# Patient Record
Sex: FEMALE | Race: WHITE | URBAN - METROPOLITAN AREA
[De-identification: names, ages, dates, MRNs, and addresses within clinical notes are randomized per-mention and may not be internally consistent; named-entity substitution may affect disease eponyms.]

---

## 2017-08-18 ENCOUNTER — HOSPITAL ENCOUNTER (OUTPATIENT)
Dept: GENERAL RADIOLOGY | Age: 77
Discharge: HOME | End: 2017-08-18
Attending: OBSTETRICS & GYNECOLOGY | Admitting: OBSTETRICS & GYNECOLOGY

## 2017-08-18 DIAGNOSIS — Z13.820 ENCOUNTER FOR IMAGING TO ASSESS OSTEOPOROSIS: ICD-10-CM

## 2017-08-18 DIAGNOSIS — M81.0 AGE-RELATED OSTEOPOROSIS WITHOUT CURRENT PATHOLOGICAL FRACTURE: ICD-10-CM

## 2022-10-24 DIAGNOSIS — N17.9 AKI (ACUTE KIDNEY INJURY) (HCC): ICD-10-CM

## 2022-10-25 LAB
ALBUMIN SERPL-MCNC: 4.2 G/DL (ref 3.4–5)
ANION GAP SERPL CALCULATED.3IONS-SCNC: 12 MMOL/L (ref 3–16)
BACTERIA: ABNORMAL /HPF
BUN BLDV-MCNC: 17 MG/DL (ref 7–20)
CALCIUM SERPL-MCNC: 9.4 MG/DL (ref 8.3–10.6)
CHLORIDE BLD-SCNC: 104 MMOL/L (ref 99–110)
CO2: 25 MMOL/L (ref 21–32)
COMMENT UA: ABNORMAL
CREAT SERPL-MCNC: 2 MG/DL (ref 0.6–1.2)
EPITHELIAL CELLS, UA: ABNORMAL /HPF (ref 0–5)
GFR SERPL CREATININE-BSD FRML MDRD: 24 ML/MIN/{1.73_M2}
GLUCOSE BLD-MCNC: 146 MG/DL (ref 70–99)
KAPPA, FREE LIGHT CHAINS, SERUM: 39.37 MG/L (ref 3.3–19.4)
KAPPA/LAMBDA RATIO: 1.06 (ref 0.26–1.65)
KAPPA/LAMBDA TEST COMMENT: ABNORMAL
LAMBDA, FREE LIGHT CHAINS, SERUM: 37.18 MG/L (ref 5.71–26.3)
PHOSPHORUS: 4.1 MG/DL (ref 2.5–4.9)
POTASSIUM SERPL-SCNC: 5 MMOL/L (ref 3.5–5.1)
RBC UA: ABNORMAL /HPF (ref 0–4)
SODIUM BLD-SCNC: 141 MMOL/L (ref 136–145)
URINE TYPE: ABNORMAL
WBC UA: 7688 /HPF (ref 0–5)

## 2022-10-31 ENCOUNTER — HOSPITAL ENCOUNTER (OUTPATIENT)
Dept: ULTRASOUND IMAGING | Age: 82
Discharge: HOME | End: 2022-10-31
Payer: MEDICARE

## 2022-10-31 DIAGNOSIS — N17.9 AKI (ACUTE KIDNEY INJURY) (HCC): ICD-10-CM

## 2022-10-31 PROCEDURE — 76770 US EXAM ABDO BACK WALL COMP: CPT

## 2022-12-02 ENCOUNTER — OFFICE VISIT (OUTPATIENT)
Dept: SURGERY | Age: 82
End: 2022-12-02
Payer: MEDICARE

## 2022-12-02 VITALS
BODY MASS INDEX: 20.81 KG/M2 | WEIGHT: 106 LBS | SYSTOLIC BLOOD PRESSURE: 126 MMHG | DIASTOLIC BLOOD PRESSURE: 75 MMHG | HEIGHT: 60 IN | HEART RATE: 62 BPM

## 2022-12-02 DIAGNOSIS — L73.2 HIDRADENITIS SUPPURATIVA: Primary | ICD-10-CM

## 2022-12-02 PROCEDURE — 99203 OFFICE O/P NEW LOW 30 MIN: CPT | Performed by: SURGERY

## 2022-12-02 PROCEDURE — 1123F ACP DISCUSS/DSCN MKR DOCD: CPT | Performed by: SURGERY

## 2022-12-02 RX ORDER — DOXYCYCLINE HYCLATE 100 MG
100 TABLET ORAL 2 TIMES DAILY
Qty: 28 TABLET | Refills: 0 | Status: SHIPPED | OUTPATIENT
Start: 2022-12-02 | End: 2022-12-16

## 2022-12-02 NOTE — PROGRESS NOTES
PATIENT NAME: Hailey Vasquez     YOB: 1940     TODAY'S DATE: 12/9/2022    Reason for Visit:  Draining site in the left groin    Requesting Physician:  Dr. Alberto Parks:              The patient is a 78 y.o. female with a PMHx as delineated below who presents with a several month history of chronic drainage from a 1-2 cm area just over her left pubic tubercle. This has been associated with induration and erythema. Chief Complaint   Patient presents with    New Patient     Draining abscess on abdominal wall        REVIEW OF SYSTEMS:  CONSTITUTIONAL:  negative  HEENT:  negative  RESPIRATORY:  negative  CARDIOVASCULAR:  negative  GASTROINTESTINAL:  negative  GENITOURINARY:  negative  HEMATOLOGIC/LYMPHATIC:  negative  MUSCULOSKELETAL: negative  NEUROLOGICAL:  negative    PMH  Past Medical History:   Diagnosis Date    Osteoporosis        PSH  No past surgical history on file. Social History  Social History     Socioeconomic History    Marital status:      Spouse name: Not on file    Number of children: Not on file    Years of education: Not on file    Highest education level: Not on file   Occupational History    Not on file   Tobacco Use    Smoking status: Former    Smokeless tobacco: Never   Substance and Sexual Activity    Alcohol use: Yes    Drug use: Not on file    Sexual activity: Not on file   Other Topics Concern    Not on file   Social History Narrative    Not on file     Social Determinants of Health     Financial Resource Strain: Not on file   Food Insecurity: Not on file   Transportation Needs: Not on file   Physical Activity: Not on file   Stress: Not on file   Social Connections: Not on file   Intimate Partner Violence: Not on file   Housing Stability: Not on file       Family History:   No family history on file.     Allergy: No Known Allergies    PHYSICAL EXAM:  VITALS:  /75   Pulse 62   Ht 5' (1.524 m)   Wt 106 lb (48.1 kg)   BMI 20.70 kg/m² CONSTITUTIONAL:  alert, no apparent distress and normal weight  EYES:  sclera clear  ENT:  normocepalic, without obvious abnormality  NECK:  supple, symmetrical, trachea midline and no carotid bruits  LUNGS:  clear to auscultation  CARDIOVASCULAR:  regular rate and rhythm and no murmur noted  ABDOMEN:  no scars, normal bowel sounds, soft, non-distended, non-tender, voluntary guarding absent, no masses palpated and hernia absent  MUSCULOSKELETAL:  0+ pitting edema lower extremities  NEUROLOGIC:  Mental Status Exam:  Level of Alertness:   awake  Orientation:   person, place, time  SKIN:  Over the left medial groin, there is a 1.5 cm area of induration with a small skin opening. IMPRESSION/RECOMMENDATIONS:    The patient has a chronically infected area of her medial left groin. This mat represent a chronically infected hair follicle or hidradenitis. I will place her on a 2 week course of Doxycycline and if this does not resolve consider excision.      Judy Christian MD

## 2022-12-03 NOTE — PROGRESS NOTES
PATIENT NAME: Santa Holden     YOB: 1940     TODAY'S DATE: 12/2/2022    Reason for Visit:  Chronic groin wound      HISTORY OF PRESENT ILLNESS:              The patient is a 78 y.o. female with a PMHx as delineated below who presents with a chronically draining wound over the past 2 months. She reports noticing the wound on her left groin after striking it with a poor sleeper. Since then she has had continued serous drainage from the site but denies any associated pain, discomfort, or other symptoms including fevers, chills or edema. REVIEW OF SYSTEMS:  CONSTITUTIONAL:  negative  HEENT:  negative  RESPIRATORY:  negative  CARDIOVASCULAR:  negative  GASTROINTESTINAL:  negative  GENITOURINARY:  negative  HEMATOLOGIC/LYMPHATIC:  negative  MUSCULOSKELETAL: negative  NEUROLOGICAL:  negative    PMH  Past Medical History:   Diagnosis Date    Osteoporosis        PSH  No past surgical history on file. Social History  Social History     Socioeconomic History    Marital status:      Spouse name: Not on file    Number of children: Not on file    Years of education: Not on file    Highest education level: Not on file   Occupational History    Not on file   Tobacco Use    Smoking status: Former    Smokeless tobacco: Never   Substance and Sexual Activity    Alcohol use: Yes    Drug use: Not on file    Sexual activity: Not on file   Other Topics Concern    Not on file   Social History Narrative    Not on file     Social Determinants of Health     Financial Resource Strain: Not on file   Food Insecurity: Not on file   Transportation Needs: Not on file   Physical Activity: Not on file   Stress: Not on file   Social Connections: Not on file   Intimate Partner Violence: Not on file   Housing Stability: Not on file       Family History:   No family history on file.     Allergy: No Known Allergies    PHYSICAL EXAM:  VITALS:  /75   Pulse 62   Ht 5' (1.524 m)   Wt 106 lb (48.1 kg)   BMI 20.70 kg/m² CONSTITUTIONAL:  alert, no apparent distress and thin  EYES:  sclera clear  ENT:  normocepalic, without obvious abnormality  NECK:  supple, symmetrical, trachea midline  LUNGS:  clear to auscultation  CARDIOVASCULAR:  regular rate and rhythm  ABDOMEN:  no scars, soft, non-distended, non-tender, no masses palpated and hernia absent  NEUROLOGIC:  Mental Status Exam:  Level of Alertness:   awake  Orientation:   person, place, time  SKIN: Mildly erythematous area overlying the left pubic bone with punctate area draining thin serous material upon expression; no evidence of purulence        IMPRESSION/RECOMMENDATIONS:    Given the patient's chronically draining wound, will prescribe a short course of doxycycline and have the patient return to clinic later this month. Aurelio Dos Santos MD  General Surgery, PGY-3  12/02/22  9:12 PM  [phone]

## 2022-12-09 ENCOUNTER — OFFICE VISIT (OUTPATIENT)
Dept: SURGERY | Age: 82
End: 2022-12-09
Payer: MEDICARE

## 2022-12-09 VITALS
WEIGHT: 105 LBS | DIASTOLIC BLOOD PRESSURE: 67 MMHG | BODY MASS INDEX: 20.51 KG/M2 | SYSTOLIC BLOOD PRESSURE: 143 MMHG | HEART RATE: 100 BPM

## 2022-12-09 DIAGNOSIS — L73.2 HIDRADENITIS SUPPURATIVA: Primary | ICD-10-CM

## 2022-12-09 PROCEDURE — 99213 OFFICE O/P EST LOW 20 MIN: CPT | Performed by: SURGERY

## 2022-12-09 PROCEDURE — 1123F ACP DISCUSS/DSCN MKR DOCD: CPT | Performed by: SURGERY

## 2022-12-12 NOTE — PROGRESS NOTES
PATIENT NAME: Grabiel Hendrickson     YOB: 1940     TODAY'S DATE: 12/11/2022    Reason for Visit:  Draining site in the left groin    Requesting Physician:  Dr. Zabrina Castellano:              The patient is a 78 y.o. female with a PMHx as delineated below who presents with a several month history of chronic drainage from a 1-2 cm area just over her left pubic tubercle. This has been associated with induration and erythema. Chief Complaint   Patient presents with    Follow-up     medial left groin wound        REVIEW OF SYSTEMS:  CONSTITUTIONAL:  negative  HEENT:  negative  RESPIRATORY:  negative  CARDIOVASCULAR:  negative  GASTROINTESTINAL:  negative  GENITOURINARY:  negative  HEMATOLOGIC/LYMPHATIC:  negative  MUSCULOSKELETAL: negative  NEUROLOGICAL:  negative    PMH  Past Medical History:   Diagnosis Date    Osteoporosis        PSH  No past surgical history on file. Social History  Social History     Socioeconomic History    Marital status:      Spouse name: Not on file    Number of children: Not on file    Years of education: Not on file    Highest education level: Not on file   Occupational History    Not on file   Tobacco Use    Smoking status: Former    Smokeless tobacco: Never   Substance and Sexual Activity    Alcohol use: Yes    Drug use: Not on file    Sexual activity: Not on file   Other Topics Concern    Not on file   Social History Narrative    Not on file     Social Determinants of Health     Financial Resource Strain: Not on file   Food Insecurity: Not on file   Transportation Needs: Not on file   Physical Activity: Not on file   Stress: Not on file   Social Connections: Not on file   Intimate Partner Violence: Not on file   Housing Stability: Not on file       Family History:   No family history on file.     Allergy: No Known Allergies    PHYSICAL EXAM:  VITALS:  BP (!) 143/67   Pulse 100   Wt 105 lb (47.6 kg)   BMI 20.51 kg/m²     CONSTITUTIONAL:  alert, no apparent distress and normal weight  EYES:  sclera clear  ENT:  normocepalic, without obvious abnormality  NECK:  supple, symmetrical, trachea midline and no carotid bruits  LUNGS:  clear to auscultation  CARDIOVASCULAR:  regular rate and rhythm and no murmur noted  ABDOMEN:  no scars, normal bowel sounds, soft, non-distended, non-tender, voluntary guarding absent, no masses palpated and hernia absent  MUSCULOSKELETAL:  0+ pitting edema lower extremities  NEUROLOGIC:  Mental Status Exam:  Level of Alertness:   awake  Orientation:   person, place, time  SKIN:  Over the left medial groin, there is a 1.5 cm area of induration with a small skin opening. IMPRESSION/RECOMMENDATIONS:    The patient has a chronically infected area of her medial left groin, possibly a chronically infected hair follicle or hidradenitis. She unfortunately only started her Doxycycline course a few days ago, so we will see her back in another two weeks for re-evaluation. Sandor Parra MD  General Surgery, PGY-3  12/11/22  9:11 PM  [phone]    I have seen, examined, and reviewed the patients chart. I agree with the residents assessment and have made appropriate changes.     Paolo Hunter

## 2022-12-21 ENCOUNTER — TELEPHONE (OUTPATIENT)
Dept: SURGERY | Age: 82
End: 2022-12-21

## 2022-12-22 ENCOUNTER — OFFICE VISIT (OUTPATIENT)
Dept: SURGERY | Age: 82
End: 2022-12-22
Payer: MEDICARE

## 2022-12-22 VITALS
BODY MASS INDEX: 20.51 KG/M2 | DIASTOLIC BLOOD PRESSURE: 66 MMHG | SYSTOLIC BLOOD PRESSURE: 144 MMHG | WEIGHT: 105 LBS | HEART RATE: 99 BPM

## 2022-12-22 DIAGNOSIS — L73.2 HIDRADENITIS SUPPURATIVA: Primary | ICD-10-CM

## 2022-12-22 PROCEDURE — 1123F ACP DISCUSS/DSCN MKR DOCD: CPT | Performed by: SURGERY

## 2022-12-22 PROCEDURE — 99213 OFFICE O/P EST LOW 20 MIN: CPT | Performed by: SURGERY

## 2022-12-22 RX ORDER — DOXYCYCLINE HYCLATE 100 MG
100 TABLET ORAL 2 TIMES DAILY
Qty: 20 TABLET | Refills: 0 | Status: SHIPPED | OUTPATIENT
Start: 2022-12-22 | End: 2023-01-01

## 2022-12-29 NOTE — PROGRESS NOTES
PATIENT NAME: Agueda Gale     YOB: 1940     TODAY'S DATE: 12/29/2022    Reason for Visit:  Draining site in the left groin    Requesting Physician:  Dr. Winter Halo:              The patient is a 78 y.o. female with a PMHx as delineated below who presents with a several month history of chronic drainage from a 1-2 cm area just over her left pubic tubercle. This has been associated with induration and erythema. Chief Complaint   Patient presents with    Follow-up     2 wk f/u        REVIEW OF SYSTEMS:  CONSTITUTIONAL:  negative  HEENT:  negative  RESPIRATORY:  negative  CARDIOVASCULAR:  negative  GASTROINTESTINAL:  negative  GENITOURINARY:  negative  HEMATOLOGIC/LYMPHATIC:  negative  MUSCULOSKELETAL: negative  NEUROLOGICAL:  negative    PMH  Past Medical History:   Diagnosis Date    Osteoporosis        PSH  No past surgical history on file. Social History  Social History     Socioeconomic History    Marital status:      Spouse name: Not on file    Number of children: Not on file    Years of education: Not on file    Highest education level: Not on file   Occupational History    Not on file   Tobacco Use    Smoking status: Former    Smokeless tobacco: Never   Substance and Sexual Activity    Alcohol use: Yes    Drug use: Not on file    Sexual activity: Not on file   Other Topics Concern    Not on file   Social History Narrative    Not on file     Social Determinants of Health     Financial Resource Strain: Not on file   Food Insecurity: Not on file   Transportation Needs: Not on file   Physical Activity: Not on file   Stress: Not on file   Social Connections: Not on file   Intimate Partner Violence: Not on file   Housing Stability: Not on file       Family History:   No family history on file.     Allergy: No Known Allergies    PHYSICAL EXAM:  VITALS:  BP (!) 144/66   Pulse 99   Wt 105 lb (47.6 kg)   BMI 20.51 kg/m²     CONSTITUTIONAL:  alert, no apparent distress and normal weight  EYES:  sclera clear  ENT:  normocepalic, without obvious abnormality  NECK:  supple, symmetrical, trachea midline and no carotid bruits  LUNGS:  clear to auscultation  CARDIOVASCULAR:  regular rate and rhythm and no murmur noted  ABDOMEN:  no scars, normal bowel sounds, soft, non-distended, non-tender, voluntary guarding absent, no masses palpated and hernia absent  MUSCULOSKELETAL:  0+ pitting edema lower extremities  NEUROLOGIC:  Mental Status Exam:  Level of Alertness:   awake  Orientation:   person, place, time  SKIN:  Over the left medial groin, there is a 1.5 cm area of induration with a small skin opening. This was anaesthetized with 1% lidocaine and the area was unroofed. Purulent fluid expressed. Hemostasis was obtained with silver nitrate sticks. IMPRESSION/RECOMMENDATIONS:    The patient has a chronically infected area of her medial left groin that was unroofed in the office. This will heal by secondary intention. Wound care instructions were given and I will see her back in the office in 2 weeks.      Nkechi Rodriguez

## 2023-01-13 ENCOUNTER — OFFICE VISIT (OUTPATIENT)
Dept: SURGERY | Age: 83
End: 2023-01-13
Payer: MEDICARE

## 2023-01-13 VITALS
WEIGHT: 105 LBS | HEART RATE: 98 BPM | BODY MASS INDEX: 20.51 KG/M2 | SYSTOLIC BLOOD PRESSURE: 140 MMHG | DIASTOLIC BLOOD PRESSURE: 68 MMHG

## 2023-01-13 DIAGNOSIS — L73.2 HIDRADENITIS SUPPURATIVA: Primary | ICD-10-CM

## 2023-01-13 DIAGNOSIS — N18.30 STAGE 3 CHRONIC KIDNEY DISEASE, UNSPECIFIED WHETHER STAGE 3A OR 3B CKD (HCC): ICD-10-CM

## 2023-01-13 DIAGNOSIS — N17.9 ACUTE KIDNEY INJURY SUPERIMPOSED ON CHRONIC KIDNEY DISEASE (HCC): ICD-10-CM

## 2023-01-13 DIAGNOSIS — N18.9 ACUTE KIDNEY INJURY SUPERIMPOSED ON CHRONIC KIDNEY DISEASE (HCC): ICD-10-CM

## 2023-01-13 LAB
ALBUMIN SERPL-MCNC: 4 G/DL (ref 3.4–5)
ANION GAP SERPL CALCULATED.3IONS-SCNC: 11 MMOL/L (ref 3–16)
BASOPHILS ABSOLUTE: 0 K/UL (ref 0–0.2)
BASOPHILS RELATIVE PERCENT: 0.4 %
BUN BLDV-MCNC: 15 MG/DL (ref 7–20)
CALCIUM SERPL-MCNC: 9.5 MG/DL (ref 8.3–10.6)
CHLORIDE BLD-SCNC: 102 MMOL/L (ref 99–110)
CO2: 25 MMOL/L (ref 21–32)
CREAT SERPL-MCNC: 1.3 MG/DL (ref 0.6–1.2)
CREATININE URINE: 257.9 MG/DL (ref 28–259)
EOSINOPHILS ABSOLUTE: 0.1 K/UL (ref 0–0.6)
EOSINOPHILS RELATIVE PERCENT: 1.9 %
GFR SERPL CREATININE-BSD FRML MDRD: 40 ML/MIN/{1.73_M2}
GLUCOSE BLD-MCNC: 129 MG/DL (ref 70–99)
HCT VFR BLD CALC: 37.4 % (ref 36–48)
HEMOGLOBIN: 12 G/DL (ref 12–16)
LYMPHOCYTES ABSOLUTE: 2.6 K/UL (ref 1–5.1)
LYMPHOCYTES RELATIVE PERCENT: 37.6 %
MCH RBC QN AUTO: 29.4 PG (ref 26–34)
MCHC RBC AUTO-ENTMCNC: 32.1 G/DL (ref 31–36)
MCV RBC AUTO: 91.6 FL (ref 80–100)
MICROALBUMIN UR-MCNC: 81.5 MG/DL
MICROALBUMIN/CREAT UR-RTO: 316 MG/G (ref 0–30)
MONOCYTES ABSOLUTE: 0.4 K/UL (ref 0–1.3)
MONOCYTES RELATIVE PERCENT: 6 %
NEUTROPHILS ABSOLUTE: 3.8 K/UL (ref 1.7–7.7)
NEUTROPHILS RELATIVE PERCENT: 54.1 %
PDW BLD-RTO: 14.5 % (ref 12.4–15.4)
PHOSPHORUS: 3.7 MG/DL (ref 2.5–4.9)
PLATELET # BLD: 325 K/UL (ref 135–450)
PMV BLD AUTO: 7.4 FL (ref 5–10.5)
POTASSIUM SERPL-SCNC: 4.3 MMOL/L (ref 3.5–5.1)
RBC # BLD: 4.08 M/UL (ref 4–5.2)
SODIUM BLD-SCNC: 138 MMOL/L (ref 136–145)
WBC # BLD: 6.9 K/UL (ref 4–11)

## 2023-01-13 PROCEDURE — 1123F ACP DISCUSS/DSCN MKR DOCD: CPT | Performed by: SURGERY

## 2023-01-13 PROCEDURE — 99212 OFFICE O/P EST SF 10 MIN: CPT | Performed by: SURGERY

## 2023-01-13 NOTE — PROGRESS NOTES
PATIENT NAME: Carri George     YOB: 1940     TODAY'S DATE: 1/25/2023    Reason for Visit:  Draining site in the left groin    Requesting Physician:  Dr. Sims Stable:              The patient is a 78 y.o. female with a PMHx as delineated below who presents for follow up without complaints. Chief Complaint   Patient presents with    Follow-up     4 wk f/u        REVIEW OF SYSTEMS:  CONSTITUTIONAL:  negative  HEENT:  negative  RESPIRATORY:  negative  CARDIOVASCULAR:  negative  GASTROINTESTINAL:  negative  GENITOURINARY:  negative  HEMATOLOGIC/LYMPHATIC:  negative  MUSCULOSKELETAL: negative  NEUROLOGICAL:  negative    PMH  Past Medical History:   Diagnosis Date    Osteoporosis        PSH  No past surgical history on file. Social History  Social History     Socioeconomic History    Marital status:      Spouse name: Not on file    Number of children: Not on file    Years of education: Not on file    Highest education level: Not on file   Occupational History    Not on file   Tobacco Use    Smoking status: Former    Smokeless tobacco: Never   Substance and Sexual Activity    Alcohol use: Yes    Drug use: Not on file    Sexual activity: Not on file   Other Topics Concern    Not on file   Social History Narrative    Not on file     Social Determinants of Health     Financial Resource Strain: Not on file   Food Insecurity: Not on file   Transportation Needs: Not on file   Physical Activity: Not on file   Stress: Not on file   Social Connections: Not on file   Intimate Partner Violence: Not on file   Housing Stability: Not on file       Family History:   No family history on file.     Allergy: No Known Allergies    PHYSICAL EXAM:  VITALS:  BP (!) 140/68   Pulse 98   Wt 105 lb (47.6 kg)   BMI 20.51 kg/m²     CONSTITUTIONAL:  alert, no apparent distress and normal weight  EYES:  sclera clear  ENT:  normocepalic, without obvious abnormality  NECK:  supple, symmetrical, trachea midline and no carotid bruits  LUNGS:  clear to auscultation  CARDIOVASCULAR:  regular rate and rhythm and no murmur noted  ABDOMEN:  no scars, normal bowel sounds, soft, non-distended, non-tender, voluntary guarding absent, no masses palpated and hernia absent  MUSCULOSKELETAL:  0+ pitting edema lower extremities  NEUROLOGIC:  Mental Status Exam:  Level of Alertness:   awake  Orientation:   person, place, time  SKIN:  Over the left medial groin, the incision is healing, no erythema    IMPRESSION/RECOMMENDATIONS:    The patient has a chronically infected area of her medial left groin that was unroofed in the office. Wound healing well but still with drainage. No infection. Wound care instructions were given and I will see her back in the office in 2 weeks.      Alex Campos

## 2023-02-03 ENCOUNTER — OFFICE VISIT (OUTPATIENT)
Dept: SURGERY | Age: 83
End: 2023-02-03
Payer: MEDICARE

## 2023-02-03 VITALS
HEART RATE: 98 BPM | BODY MASS INDEX: 20.51 KG/M2 | DIASTOLIC BLOOD PRESSURE: 38 MMHG | SYSTOLIC BLOOD PRESSURE: 140 MMHG | WEIGHT: 105 LBS

## 2023-02-03 DIAGNOSIS — S31.109D CHRONIC WOUND INFECTION OF ABDOMEN, SUBSEQUENT ENCOUNTER: Primary | ICD-10-CM

## 2023-02-03 DIAGNOSIS — L08.9 CHRONIC WOUND INFECTION OF ABDOMEN, SUBSEQUENT ENCOUNTER: Primary | ICD-10-CM

## 2023-02-03 PROCEDURE — 99213 OFFICE O/P EST LOW 20 MIN: CPT | Performed by: SURGERY

## 2023-02-03 PROCEDURE — 1123F ACP DISCUSS/DSCN MKR DOCD: CPT | Performed by: SURGERY

## 2023-02-03 NOTE — LETTER
P - [facility], M[facility]  [address] [address] [facility]. [address]  Phone: [phone] Fax: [phone]            Surgery Order/Time:                 Conf. # _________________  Scheduled by: Mago Jacinto Lpn                                **Pre-Surgical Orders in [facility]**  Facility: [facility] [place].    Surgery Date: 2023  Time: 1230pm    Pt arrival: 1030  Second Surgeon: N/A        Patient Name: Maggy Rivero  : 1940  Home Ph:[phone] (home)  [address] [place] [address] Road,[address] [facility]  PCP:  Cleveland Zuluaga MD   Does patient speak English?: yes    needed? no                                             PROCEDURE: Excision of chronic wound of the left groin  CPT: 27252: Excision, tumor, soft tissue of pelvis and hip area, subcutaneous   DIAGNOSIS:      ICD-10-CM    1. Chronic wound infection of abdomen, subsequent encounter  S31.109D     L08.9           Anesthesia: MAC  Time Needed:  30 minutes   Pt Position:  supine      Outpatient __x__ Admit ______  Assist._____   Cardiac Clearance: no  Patient to meet with Anesthesiology prior to surgery: no    Patient Allergies: No Known Allergies  Pre-Op H&P to be done by: Cleveland Zuluaga MD  Pre-Op Antibiotics: Ancef: 2g IV On Call to OR      Physician: Robinson Monroe MD Date: 23             Insurance:     ID #      Ph #   Date called ________________   Donal Mccallum to: _____________ Precert Needed?  Yes  /  No  PreAuth # & Details   ______________________________________________________________________

## 2023-02-08 ENCOUNTER — TELEPHONE (OUTPATIENT)
Dept: SURGERY | Age: 83
End: 2023-02-08

## 2023-02-08 NOTE — TELEPHONE ENCOUNTER
Patient seen on 2/3/23 surgery letter received Excision of chronic wound of the left groin 30 min OR time needed under MAC    Covid vaccinated    Pre op instructions reviewed including the need for a H&P with a EKG and a 18 or older  for DOS   Pre op packet mailed     Post op appt scheduled     Faxed to scheduling     Placed on calender and outlook

## 2023-02-14 ENCOUNTER — HOSPITAL ENCOUNTER (OUTPATIENT)
Dept: NUCLEAR MEDICINE | Age: 83
Discharge: HOME | End: 2023-02-14
Payer: MEDICARE

## 2023-02-14 DIAGNOSIS — N18.30 STAGE 3 CHRONIC KIDNEY DISEASE, UNSPECIFIED WHETHER STAGE 3A OR 3B CKD (HCC): ICD-10-CM

## 2023-02-14 DIAGNOSIS — N17.9 AKI (ACUTE KIDNEY INJURY) (HCC): ICD-10-CM

## 2023-02-14 PROCEDURE — 6360000002 HC RX W HCPCS: Performed by: INTERNAL MEDICINE

## 2023-02-14 PROCEDURE — 78708 K FLOW/FUNCT IMAGE W/DRUG: CPT

## 2023-02-14 PROCEDURE — A9562 TC99M MERTIATIDE: HCPCS | Performed by: INTERNAL MEDICINE

## 2023-02-14 PROCEDURE — 3430000000 HC RX DIAGNOSTIC RADIOPHARMACEUTICAL: Performed by: INTERNAL MEDICINE

## 2023-02-14 RX ORDER — FUROSEMIDE 10 MG/ML
40 INJECTION INTRAMUSCULAR; INTRAVENOUS ONCE
Status: COMPLETED | OUTPATIENT
Start: 2023-02-14 | End: 2023-02-14

## 2023-02-14 RX ADMIN — FUROSEMIDE 40 MG: 10 INJECTION, SOLUTION INTRAMUSCULAR; INTRAVENOUS at 13:34

## 2023-02-14 RX ADMIN — Medication 8 MILLICURIE: at 13:12

## 2023-02-16 NOTE — PROGRESS NOTES
PATIENT NAME: Jacky Lundborg     YOB: 1940     TODAY'S DATE: 2/16/2023    Reason for Visit:  Draining site in the left groin    Requesting Physician:  Dr. Reese Merritt:              The patient is a 78 y.o. female with a PMHx as delineated below who presents for follow up without complaints. Chief Complaint   Patient presents with    Follow-up     F/u draining abscess on abd wall        REVIEW OF SYSTEMS:  CONSTITUTIONAL:  negative  HEENT:  negative  RESPIRATORY:  negative  CARDIOVASCULAR:  negative  GASTROINTESTINAL:  negative  GENITOURINARY:  negative  HEMATOLOGIC/LYMPHATIC:  negative  MUSCULOSKELETAL: negative  NEUROLOGICAL:  negative    PMH  Past Medical History:   Diagnosis Date    Chronic renal failure, stage 3 (moderate), unspecified whether stage 3a or 3b CKD (HCC)     Osteoporosis        PSH  No past surgical history on file. Social History  Social History     Socioeconomic History    Marital status:      Spouse name: Not on file    Number of children: Not on file    Years of education: Not on file    Highest education level: Not on file   Occupational History    Not on file   Tobacco Use    Smoking status: Former    Smokeless tobacco: Never   Substance and Sexual Activity    Alcohol use: Yes    Drug use: Not on file    Sexual activity: Not on file   Other Topics Concern    Not on file   Social History Narrative    Not on file     Social Determinants of Health     Financial Resource Strain: Not on file   Food Insecurity: Not on file   Transportation Needs: Not on file   Physical Activity: Not on file   Stress: Not on file   Social Connections: Not on file   Intimate Partner Violence: Not on file   Housing Stability: Not on file       Family History:   No family history on file.     Allergy: No Known Allergies    PHYSICAL EXAM:  VITALS:  BP (!) 140/38   Pulse 98   Wt 105 lb (47.6 kg)   BMI 20.51 kg/m²     CONSTITUTIONAL:  alert, no apparent distress and normal weight  EYES:  sclera clear  ENT:  normocepalic, without obvious abnormality  NECK:  supple, symmetrical, trachea midline and no carotid bruits  LUNGS:  clear to auscultation  CARDIOVASCULAR:  regular rate and rhythm and no murmur noted  ABDOMEN:  no scars, normal bowel sounds, soft, non-distended, non-tender, voluntary guarding absent, no masses palpated and hernia absent  MUSCULOSKELETAL:  0+ pitting edema lower extremities  NEUROLOGIC:  Mental Status Exam:  Level of Alertness:   awake  Orientation:   person, place, time  SKIN:  Over the left medial groin, the incision is healing but still open and draining, no erythema    IMPRESSION/RECOMMENDATIONS:    The patient has a chronically infected area of her medial left groin that was unroofed in the office. The wound continues not to heal and as a result, we will proceed with excision under local mac. The risks and benefits of surgery were discussed with the patient and she wishes to proceed.      Gerhardt Sawyer

## 2023-02-24 NOTE — PROGRESS NOTES
Place patient label inside box (if no patient label, complete below)  Name:  :  MR#:   Afia Tavarez / PROCEDURE  I (we) Callie Whitlock (Patient Name) authorize DR Last Castaneda (Provider / Damaris Handler) and/or such assistants as may be selected by him/her, to perform the following operation/procedure(s): EXCISION OF CHRONIC WOUND OF THE LEFT GROIN       Note: If unable to obtain consent prior to an emergent procedure, document the emergent reason in the medical record. This procedure has been explained to my (our) satisfaction and included in the explanation was: The intended benefit, nature, and extent of the procedure to be performed; The significant risks involved and the probability of success; Alternative procedures and methods of treatment; The dangers and probable consequences of such alternatives (including no procedure or treatment); The expected consequences of the procedure on my future health; Whether other qualified individuals would be performing important surgical tasks and/or whether  would be present to advise or support the procedure. I (we) understand that there are other risks of infection and other serious complications in the pre-operative/procedural and postoperative/procedural stages of my (our) care. I (we) have asked all of the questions which I (we) thought were important in deciding whether or not to undergo treatment or diagnosis. These questions have been answered to my (our) satisfaction. I (we) understand that no assurance can be given that the procedure will be a success, and no guarantee or warranty of success has been given to me (us). It has been explained to me (us) that during the course of the operation/procedure, unforeseen conditions may be revealed that necessitate extension of the original procedure(s) or different procedure(s) than those set forth in Paragraph 1.  I (we) authorize and request that the above-named physician, his/her assistants or his/her designees, perform procedures as necessary and desirable if deemed to be in my (our) best interest.     Revised 8/2/2021                                                                          Page 1 of 2         I acknowledge that health care personnel may be observing this procedure for the purpose of medical education or other specified purposes as may be necessary as requested and/or approved by my (our) physician. I (we) consent to the disposal by the hospital Pathologist of the removed tissue, parts or organs in accordance with hospital policy. I do ____ do not ____ consent to the use of a local infiltration pain blocking agent that will be used by my provider/surgical provider to help alleviate pain during my procedure. I do ____ do not ____ consent to an emergent blood transfusion in the case of a life-threatening situation that requires blood components to be administered. This consent is valid for 24 hours from the beginning of the procedure. This patient does ____ or does not ____ currently have a DNR status/order. If DNR order is in place, obtain Addendum to the Surgical Consent for ALL Patients with a DNR Order to address nikki-operative status for limited intervention or DNR suspension.      I have read and fully understand the above Consent for Operation/Procedure and that all blanks were completed before I signed the consent.   _____________________________       _____________________      ____/____am/pm  Signature of Patient or legal representative      Printed Name / Relationship            Date / Time   ____________________________       _____________________      ____/____am/pm  Witness to Signature                                    Printed Name                    Date / Time    If patient is unable to sign or is a minor, complete the following)  Patient is a minor, ____ years of age, or unable to sign because: ______________________________________________________________________________________________    If a phone consent is obtained, consent will be documented by using two health care professionals, each affirming that the consenting party has no questions and gives consent for the procedure discussed with the physician/provider.   _____________________          ____________________       _____/_____am/pm   2nd witness to phone consent        Printed name           Date / Time    Informed Consent:  I have provided the explanation described above in section 1 to the patient and/or legal representative.  I have provided the patient and/or legal representative with an opportunity to ask any questions about the proposed operation/procedure.   ___________________________          ____________________         ____/____am/pm  Provider / Proceduralist                            Printed name            Date / Time  Revised 8/2/2021                                                                      Page 2 of 2

## 2023-02-24 NOTE — PROGRESS NOTES
2/24 Spoke with patient, states son is out of town, returning today, and he handles her appt. Has not see PCP, Babar Salazar at Dr Kingsley Rivera about this, awaiting response.   [facility] for son Radha Bowman about needing h&p and phone number left-mp

## 2023-02-28 ENCOUNTER — ANESTHESIA EVENT (OUTPATIENT)
Dept: OPERATING ROOM | Age: 83
End: 2023-02-28
Payer: MEDICARE

## 2023-02-28 ENCOUNTER — TELEPHONE (OUTPATIENT)
Dept: SURGERY | Age: 83
End: 2023-02-28

## 2023-02-28 ENCOUNTER — HOSPITAL ENCOUNTER (OUTPATIENT)
Age: 83
Setting detail: OUTPATIENT SURGERY
Discharge: HOME | End: 2023-02-28
Attending: SURGERY | Admitting: SURGERY
Payer: MEDICARE

## 2023-02-28 ENCOUNTER — ANESTHESIA (OUTPATIENT)
Dept: OPERATING ROOM | Age: 83
End: 2023-02-28
Payer: MEDICARE

## 2023-02-28 VITALS
WEIGHT: 102.8 LBS | DIASTOLIC BLOOD PRESSURE: 89 MMHG | OXYGEN SATURATION: 100 % | HEART RATE: 63 BPM | SYSTOLIC BLOOD PRESSURE: 162 MMHG | BODY MASS INDEX: 20.18 KG/M2 | RESPIRATION RATE: 16 BRPM | TEMPERATURE: 96.7 F | HEIGHT: 60 IN

## 2023-02-28 DIAGNOSIS — S31.109D CHRONIC WOUND INFECTION OF ABDOMEN, SUBSEQUENT ENCOUNTER: ICD-10-CM

## 2023-02-28 DIAGNOSIS — G89.18 POST-OP PAIN: Primary | ICD-10-CM

## 2023-02-28 DIAGNOSIS — L08.9 CHRONIC WOUND INFECTION OF ABDOMEN, SUBSEQUENT ENCOUNTER: ICD-10-CM

## 2023-02-28 LAB
GLUCOSE BLD-MCNC: 88 MG/DL (ref 70–99)
PERFORMED ON: NORMAL

## 2023-02-28 PROCEDURE — 3700000001 HC ADD 15 MINUTES (ANESTHESIA): Performed by: SURGERY

## 2023-02-28 PROCEDURE — 2709999900 HC NON-CHARGEABLE SUPPLY: Performed by: SURGERY

## 2023-02-28 PROCEDURE — 11406 EXC TR-EXT B9+MARG >4.0 CM: CPT | Performed by: SURGERY

## 2023-02-28 PROCEDURE — 2500000003 HC RX 250 WO HCPCS: Performed by: SURGERY

## 2023-02-28 PROCEDURE — 6360000002 HC RX W HCPCS: Performed by: SURGERY

## 2023-02-28 PROCEDURE — 3600000014 HC SURGERY LEVEL 4 ADDTL 15MIN: Performed by: SURGERY

## 2023-02-28 PROCEDURE — 3600000004 HC SURGERY LEVEL 4 BASE: Performed by: SURGERY

## 2023-02-28 PROCEDURE — 88304 TISSUE EXAM BY PATHOLOGIST: CPT

## 2023-02-28 PROCEDURE — 3700000000 HC ANESTHESIA ATTENDED CARE: Performed by: SURGERY

## 2023-02-28 PROCEDURE — 7100000001 HC PACU RECOVERY - ADDTL 15 MIN: Performed by: SURGERY

## 2023-02-28 PROCEDURE — 6360000002 HC RX W HCPCS: Performed by: NURSE ANESTHETIST, CERTIFIED REGISTERED

## 2023-02-28 PROCEDURE — A4217 STERILE WATER/SALINE, 500 ML: HCPCS | Performed by: SURGERY

## 2023-02-28 PROCEDURE — 7100000010 HC PHASE II RECOVERY - FIRST 15 MIN: Performed by: SURGERY

## 2023-02-28 PROCEDURE — 2580000003 HC RX 258: Performed by: FAMILY MEDICINE

## 2023-02-28 PROCEDURE — 7100000011 HC PHASE II RECOVERY - ADDTL 15 MIN: Performed by: SURGERY

## 2023-02-28 PROCEDURE — 2580000003 HC RX 258: Performed by: SURGERY

## 2023-02-28 PROCEDURE — 7100000000 HC PACU RECOVERY - FIRST 15 MIN: Performed by: SURGERY

## 2023-02-28 RX ORDER — MEPERIDINE HYDROCHLORIDE 25 MG/ML
12.5 INJECTION INTRAMUSCULAR; INTRAVENOUS; SUBCUTANEOUS EVERY 5 MIN PRN
Status: DISCONTINUED | OUTPATIENT
Start: 2023-02-28 | End: 2023-02-28 | Stop reason: HOSPADM

## 2023-02-28 RX ORDER — PROPOFOL 10 MG/ML
INJECTION, EMULSION INTRAVENOUS CONTINUOUS PRN
Status: DISCONTINUED | OUTPATIENT
Start: 2023-02-28 | End: 2023-02-28 | Stop reason: SDUPTHER

## 2023-02-28 RX ORDER — CETIRIZINE HYDROCHLORIDE 10 MG/1
10 TABLET ORAL DAILY
COMMUNITY

## 2023-02-28 RX ORDER — LIDOCAINE HYDROCHLORIDE 20 MG/ML
INJECTION, SOLUTION INTRAVENOUS PRN
Status: DISCONTINUED | OUTPATIENT
Start: 2023-02-28 | End: 2023-02-28 | Stop reason: SDUPTHER

## 2023-02-28 RX ORDER — FENTANYL CITRATE 50 UG/ML
INJECTION, SOLUTION INTRAMUSCULAR; INTRAVENOUS PRN
Status: DISCONTINUED | OUTPATIENT
Start: 2023-02-28 | End: 2023-02-28 | Stop reason: SDUPTHER

## 2023-02-28 RX ORDER — SODIUM CHLORIDE, SODIUM LACTATE, POTASSIUM CHLORIDE, CALCIUM CHLORIDE 600; 310; 30; 20 MG/100ML; MG/100ML; MG/100ML; MG/100ML
INJECTION, SOLUTION INTRAVENOUS CONTINUOUS
Status: DISCONTINUED | OUTPATIENT
Start: 2023-02-28 | End: 2023-02-28 | Stop reason: HOSPADM

## 2023-02-28 RX ORDER — SODIUM CHLORIDE 9 MG/ML
25 INJECTION, SOLUTION INTRAVENOUS PRN
Status: DISCONTINUED | OUTPATIENT
Start: 2023-02-28 | End: 2023-02-28 | Stop reason: HOSPADM

## 2023-02-28 RX ORDER — LABETALOL HYDROCHLORIDE 5 MG/ML
10 INJECTION, SOLUTION INTRAVENOUS
Status: DISCONTINUED | OUTPATIENT
Start: 2023-02-28 | End: 2023-02-28 | Stop reason: HOSPADM

## 2023-02-28 RX ORDER — BUPIVACAINE HYDROCHLORIDE 2.5 MG/ML
INJECTION, SOLUTION EPIDURAL; INFILTRATION; INTRACAUDAL PRN
Status: DISCONTINUED | OUTPATIENT
Start: 2023-02-28 | End: 2023-02-28 | Stop reason: HOSPADM

## 2023-02-28 RX ORDER — MAGNESIUM HYDROXIDE 1200 MG/15ML
LIQUID ORAL CONTINUOUS PRN
Status: DISCONTINUED | OUTPATIENT
Start: 2023-02-28 | End: 2023-02-28 | Stop reason: HOSPADM

## 2023-02-28 RX ORDER — DIPHENHYDRAMINE HYDROCHLORIDE 50 MG/ML
12.5 INJECTION INTRAMUSCULAR; INTRAVENOUS
Status: DISCONTINUED | OUTPATIENT
Start: 2023-02-28 | End: 2023-02-28 | Stop reason: HOSPADM

## 2023-02-28 RX ORDER — HYDRALAZINE HYDROCHLORIDE 20 MG/ML
10 INJECTION INTRAMUSCULAR; INTRAVENOUS
Status: DISCONTINUED | OUTPATIENT
Start: 2023-02-28 | End: 2023-02-28 | Stop reason: HOSPADM

## 2023-02-28 RX ORDER — METOCLOPRAMIDE HYDROCHLORIDE 5 MG/ML
10 INJECTION INTRAMUSCULAR; INTRAVENOUS
Status: DISCONTINUED | OUTPATIENT
Start: 2023-02-28 | End: 2023-02-28 | Stop reason: HOSPADM

## 2023-02-28 RX ORDER — SODIUM CHLORIDE 0.9 % (FLUSH) 0.9 %
5-40 SYRINGE (ML) INJECTION PRN
Status: DISCONTINUED | OUTPATIENT
Start: 2023-02-28 | End: 2023-02-28 | Stop reason: HOSPADM

## 2023-02-28 RX ORDER — SODIUM CHLORIDE 0.9 % (FLUSH) 0.9 %
5-40 SYRINGE (ML) INJECTION EVERY 12 HOURS SCHEDULED
Status: DISCONTINUED | OUTPATIENT
Start: 2023-02-28 | End: 2023-02-28 | Stop reason: HOSPADM

## 2023-02-28 RX ORDER — OXYCODONE HYDROCHLORIDE 5 MG/1
5 TABLET ORAL EVERY 6 HOURS PRN
Qty: 12 TABLET | Refills: 0 | Status: SHIPPED | OUTPATIENT
Start: 2023-02-28 | End: 2023-03-03

## 2023-02-28 RX ADMIN — LIDOCAINE HYDROCHLORIDE 100 MG: 20 INJECTION, SOLUTION INTRAVENOUS at 10:35

## 2023-02-28 RX ADMIN — PHENYLEPHRINE HYDROCHLORIDE 100 MCG: 10 INJECTION, SOLUTION INTRAMUSCULAR; INTRAVENOUS; SUBCUTANEOUS at 10:46

## 2023-02-28 RX ADMIN — FENTANYL CITRATE 25 MCG: 50 INJECTION, SOLUTION INTRAMUSCULAR; INTRAVENOUS at 11:12

## 2023-02-28 RX ADMIN — FENTANYL CITRATE 25 MCG: 50 INJECTION, SOLUTION INTRAMUSCULAR; INTRAVENOUS at 10:35

## 2023-02-28 RX ADMIN — CEFAZOLIN 2000 MG: 2 INJECTION, POWDER, FOR SOLUTION INTRAMUSCULAR; INTRAVENOUS at 10:27

## 2023-02-28 RX ADMIN — PHENYLEPHRINE HYDROCHLORIDE 100 MCG: 10 INJECTION, SOLUTION INTRAMUSCULAR; INTRAVENOUS; SUBCUTANEOUS at 11:12

## 2023-02-28 RX ADMIN — SODIUM CHLORIDE, POTASSIUM CHLORIDE, SODIUM LACTATE AND CALCIUM CHLORIDE: 600; 310; 30; 20 INJECTION, SOLUTION INTRAVENOUS at 11:26

## 2023-02-28 RX ADMIN — SODIUM CHLORIDE, POTASSIUM CHLORIDE, SODIUM LACTATE AND CALCIUM CHLORIDE: 600; 310; 30; 20 INJECTION, SOLUTION INTRAVENOUS at 10:21

## 2023-02-28 RX ADMIN — PROPOFOL 100 MCG/KG/MIN: 10 INJECTION, EMULSION INTRAVENOUS at 10:35

## 2023-02-28 ASSESSMENT — PAIN - FUNCTIONAL ASSESSMENT: PAIN_FUNCTIONAL_ASSESSMENT: 0-10

## 2023-02-28 ASSESSMENT — PAIN SCALES - GENERAL: PAINLEVEL_OUTOF10: 0

## 2023-02-28 NOTE — H&P
Rodrigue Cortes    [id number]    [facility] Same Day Surgery Update H & P  Department of General Surgery   Surgical Service   Pre-operative History and Physical      DIAGNOSIS:   Chronic wound infection of abdomen, subsequent encounter [S31.109D, L08.9]    PROCEDURE:  OH OPEN BIOPSY/EXCISION INGUINOFEMORAL NODES [08912] (EXCISION OF CHRONIC WOUND OF THE LEFT GROIN)  OH EXC TUMOR SOFT TISSUE PELVIS & HIP SUBQ <3CM [66724]     HISTORY OF PRESENT ILLNESS:    Patient presents for scheduled elective procedure. No new concerns or complaints. Covid 19:  Patient denies fever, chills, cough or known exposure to Covid-19. Patient reports they have been quarantined at home since Covd-19 test.      Past Medical History:        Diagnosis Date    Arthritis     Bladder cancer ([facility] [address]     Chronic renal failure, stage 3 (moderate), unspecified whether stage 3a or 3b CKD ([facility] [address]     Kidney stone     Osteoporosis      Past Surgical History:        Procedure Laterality Date    BLADDER SURGERY N/A     r/t bladder cancer     Past Social History:  Social History     Socioeconomic History    Marital status:      Spouse name: None    Number of children: None    Years of education: None    Highest education level: None   Tobacco Use    Smoking status: Former     Types: Cigarettes    Smokeless tobacco: Never   Substance and Sexual Activity    Alcohol use: Yes     Alcohol/week: 1.0 standard drink     Types: 1 Glasses of wine per week     Comment: rarely         Medications Prior to Admission:      Prior to Admission medications    Medication Sig Start Date End Date Taking?  Authorizing Provider   cetirizine (ZYRTEC) 10 MG tablet Take 10 mg by mouth daily   Yes Historical Provider, MD   denosumab (PROLIA) 60 MG/ML SOLN SC injection Inject 60 mg into the skin once    Historical Provider, MD   Calcium Carbonate-Vitamin D (CALCIUM + D PO) Take by mouth  Patient not taking: Reported on 2/28/2023    Historical Provider, MD   Ascorbic Acid (VITAMIN C) 500 MG tablet Take 500 mg by mouth daily    Historical Provider, MD   Multiple Vitamins-Minerals (THERAPEUTIC MULTIVITAMIN-MINERALS) tablet Take 1 tablet by mouth daily  Patient not taking: Reported on 2/28/2023    Historical Provider, MD         Allergies:  Patient has no known allergies. PHYSICAL EXAM:      BP (!) 103/54   Pulse 79   Temp 96.9 °F (36.1 °C) (Temporal)   Resp 16   Ht 5' (1.524 m)   Wt 102 lb 12.8 oz (46.6 kg)   SpO2 100%   BMI 20.08 kg/m²      Heart:  regular rate and rhythm, No murmur noted    Lungs:  No increased work of breathing, good air exchange    Abdomen:  soft, non-distended    ASSESSMENT AND PLAN:    1. History obtained from patient and review of records. Patient seen and focused exam done today. 2.  Access to ancillary services are available per request of the provider.     Martha Leo MD     2/28/2023

## 2023-02-28 NOTE — BRIEF OP NOTE
Brief Postoperative Note      Patient: Marie Rodriges  YOB: 1940  MRN: [id number]    Date of Procedure: 2/28/2023    Pre-Op Diagnosis: Chronic wound infection of abdomen, subsequent encounter [S31.109D, L08.9]    Post-Op Diagnosis: Same       Procedure(s):  EXCISION OF CHRONIC WOUND OF THE LEFT GROIN    Surgeon(s):  Olena Chavez MD    Assistant:  Resident: Omega Vanessa MD    Anesthesia: Monitor Anesthesia Care    Estimated Blood Loss (mL): Minimal    Complications: None    Specimens:   ID Type Source Tests Collected by Time Destination   A : left groin wound Tissue Tissue SURGICAL PATHOLOGY Olena Chavez MD 2/28/2023 1120        Implants:  * No implants in log *      Drains: * No LDAs found *    Findings: chronic left groin found tunneling medially, 0 ethibond sutures x2 to approximate defect, packed with aquacel AG with gauze and tape overlying.  Elliptical incision about 4.5 cm long by 2 cm at widest aspect    Electronically signed by Florecita Padilla MD on 2/28/2023 at 11:26 AM

## 2023-02-28 NOTE — DISCHARGE INSTRUCTIONS
Discharge Instructions:    Diet:   You may resume a regular diet. Wound Care:   Packing was placed in your wound bed. Do NOT remove packing until follow up. Change dry gauze dressing and tape daily or as needed    Activity:   No heavy lifting greater than a milk jug until follow up. Pain management:   Unless informed of any restrictions by your primary care physician, please use your preferred over-the-counter pain reliever as your primary pain medication. If you have pain that persists despite over-the-counter pain medications, you have been provided with a prescription for an opioid/narcotic pain reliever   No driving or operating machinery while taking opioid/narcotic medications. Bowel Regimen:   Opioid/Narcotic pain relievers have a common side effect of constipation; therefore, you have been provided with a prescription for a stool softener, Docusate (Colace). These medications are intended to help prevent you from experiencing this very common side effect and also help to regulate your bowels after surgery. If your stools become too loose and/or frequent, decrease the Colace to one pill one time each day. If your stools are still loose after this modification, stop taking this medication all together. Return Precautions:   Call/ Return to ED for increased redness, worsening pain, drainage from wound, fevers, or any other concerns about your incision or post op course. Follow up with Dr. Lester Hitchcock on 3/3/23. Call [phone] with questions     [address]    There are potential side effects of anesthesia or sedation you may experience for the first 24 hours. These side effects include:    Confusion or Memory loss, Dizziness, or Delayed Reaction Times   [x]A responsible person should be with you for the next 24 hours. Do not operate any vehicles (automobiles, bicycles, motorcycles) or power tools or machinery for 24 hours.   Do not sign any legal documents or make any legal decisions for 24 hours. Do not drink alcohol for 24 hours or while taking narcotic pain medication. Nausea    [x]Start with light diet and progress to your normal diet as you feel like eating. However, if you experience nausea or repeated episodes of vomiting which persist beyond 12-24 hours, notify your physician. Once nausea has passed, remember to keep drinking fluids. Difficulty Passing Urine  [x]Drink extra amounts of fluid today. Notify your physician if you have not urinated within 8 hours after your procedure or you feel uncomfortable. Irritated Throat from a Breathing Tube  [x]Drink extra amounts of fluid today. Lozenges may help. Muscle Aches  [x]You may experience some generalized body aches as your muscles recover from medications used to relax them during surgery. These will gradually subside. MEDICATION INSTRUCTIONS:  [x]Prescription(S) x  one    sent with you. Use as directed. When taking pain medications, you may experience the side effect of dizziness or drowsiness. Do not drink alcohol or drive when taking these medications. []Prescription(S) x          Called to Pharmacy Name and location:    [x]Give the list of your medications to your primary care physician on your next visit. Keep your med list updated and carry it with in case of emergencies. [x] Narcotic pain medications can cause the side effect of significant constipation. You may want to add a stool softener to your postoperative medication schedule or speak to your surgeon on how best to manage this side effect. NARCOTIC SAFETY:  Your pain medicine is only for you to take. Safely store your medicines. Store pills up high and out of reach of children and pets. Ensure safety caps are snapped tightly  Keep track of how many pills you have left    Unused medication can be disposed of by taking them to a drop-off box or take-back program that is authorized by the [facility]. Access to a site near you can be found on the [facility] [place] [employer] website ([address] [facility]. [facility] [place] [place]). If you have a CPAP machine, it is very important that you use it daily during all periods of sleep and daytime rest during your recovery at home. Surgery and Anesthesia place a significant amount of stress on your body. Using your CPAP will help keep you safe and lessen the negative effects of that stress. FOLLOW-UP RECOVERY CARE:  [x]Call the office of Dr Drew Cochran  for follow-up appointment and problems    Watch for these possible complications, symptoms, or side effects of anesthesia. Call physician if they or any other problems occur:  Signs of INFECTION   > Fever over 101°     > Redness, swelling, hardness or warmth at the operative site   >Foul smelling or cloudy drainage at the operative site   Unrelieved PAIN  Unrelieved NAUSEA  Blood soaked dressing. (Some oozing may be normal)  Inability to urinate      Numb, pale, blue, cold or tingling extremity      Physician:  Dr Drew Cochran     The above instructions were reviewed with patient/significant other. The following additional patient specific information was reviewed with the patient/significant other:  [x]Procedure/physician specific instructions  []Medication information sheet(S) including potential side effects  []Rachels egress test  []Pain Ball management  []FAQ Catheter associated blood stream infections  [x]FAQ Surgical Site Infections  []Other-    I have read and understand the instructions given to me: ____________________________________________   (Patient/S.O. Signature)            Date/time 2/28/2023 11:47 AM         PACU:  [phone]   M-F 700 AM - 7 PM      SAME DAY SERVICES:  [phone] M-F 7AM-6PM        If you smoke STOP. We care about your health!

## 2023-02-28 NOTE — ANESTHESIA PRE PROCEDURE
Department of Anesthesiology  Preprocedure Note       Name:  Shelli Coffey   Age:  78 y.o.  :  1940                                          MRN:  [id number]         Date:  2023      Surgeon: Marcella Hoover):  Maty Clemons MD    Procedure: Procedure(s):  EXCISION OF CHRONIC WOUND OF THE LEFT GROIN    Medications prior to admission:   Prior to Admission medications    Medication Sig Start Date End Date Taking?  Authorizing Provider   cetirizine (ZYRTEC) 10 MG tablet Take 10 mg by mouth daily   Yes Historical Provider, MD   denosumab (PROLIA) 60 MG/ML SOLN SC injection Inject 60 mg into the skin once    Historical Provider, MD   Calcium Carbonate-Vitamin D (CALCIUM + D PO) Take by mouth  Patient not taking: Reported on 2023    Historical Provider, MD   Ascorbic Acid (VITAMIN C) 500 MG tablet Take 500 mg by mouth daily    Historical Provider, MD   Multiple Vitamins-Minerals (THERAPEUTIC MULTIVITAMIN-MINERALS) tablet Take 1 tablet by mouth daily  Patient not taking: Reported on 2023    Historical Provider, MD       Current medications:    Current Facility-Administered Medications   Medication Dose Route Frequency Provider Last Rate Last Admin    ceFAZolin (ANCEF) 2,000 mg in sodium chloride 0.9 % 50 mL IVPB (mini-bag)  2,000 mg IntraVENous Once Maty Clemons MD        lactated ringers IV soln infusion   IntraVENous Continuous Lance Carreno MD           Allergies:  No Known Allergies    Problem List:    Patient Active Problem List   Diagnosis Code    Solar purpura ([facility] [address] D69.2       Past Medical History:        Diagnosis Date    Arthritis     Bladder cancer ([address]     Chronic renal failure, stage 3 (moderate), unspecified whether stage 3a or 3b CKD ([address]     Kidney stone     Osteoporosis        Past Surgical History:        Procedure Laterality Date    BLADDER SURGERY N/A     r/t bladder cancer       Social History:    Social History     Tobacco Use    Smoking status: Former     Types: Cigarettes    Smokeless tobacco: Never   Substance Use Topics    Alcohol use: Yes     Alcohol/week: 1.0 standard drink     Types: 1 Glasses of wine per week     Comment: rarely                                Counseling given: Not Answered      Vital Signs (Current):   Vitals:    02/24/23 1402 02/28/23 0915 02/28/23 0922   BP:  (!) 103/54    Pulse:  79    Resp:  16    Temp:  96.9 °F (36.1 °C)    TempSrc:  Temporal    SpO2:  100%    Weight: 110 lb (49.9 kg)  102 lb 12.8 oz (46.6 kg)   Height: 5' (1.524 m) 5' (1.524 m)                                               BP Readings from Last 3 Encounters:   02/28/23 (!) 103/54   02/03/23 (!) 140/38   01/31/23 118/73       NPO Status: Time of last liquid consumption: 1800                        Time of last solid consumption:  (unsure)                        Date of last liquid consumption: 02/27/23                        Date of last solid food consumption: 02/27/23    BMI:   Wt Readings from Last 3 Encounters:   02/28/23 102 lb 12.8 oz (46.6 kg)   02/03/23 105 lb (47.6 kg)   01/31/23 102 lb 12.8 oz (46.6 kg)     Body mass index is 20.08 kg/m². CBC:   Lab Results   Component Value Date/Time    WBC 6.9 01/13/2023 10:53 AM    RBC 4.08 01/13/2023 10:53 AM    HGB 12.0 01/13/2023 10:53 AM    HCT 37.4 01/13/2023 10:53 AM    MCV 91.6 01/13/2023 10:53 AM    RDW 14.5 01/13/2023 10:53 AM     01/13/2023 10:53 AM       CMP:   Lab Results   Component Value Date/Time     01/13/2023 10:53 AM    K 4.3 01/13/2023 10:53 AM     01/13/2023 10:53 AM    CO2 25 01/13/2023 10:53 AM    BUN 15 01/13/2023 10:53 AM    CREATININE 1.3 01/13/2023 10:53 AM    GFRAA 32 09/03/2022 10:21 AM    GFRAA >60 11/08/2011 11:09 AM    LABGLOM 40 01/13/2023 10:53 AM    GLUCOSE 129 01/13/2023 10:53 AM    CALCIUM 9.5 01/13/2023 10:53 AM       POC Tests: No results for input(s): POCGLU, POCNA, POCK, POCCL, POCBUN, POCHEMO, POCHCT in the last 72 hours.     Coags: No results found for: PROTIME, INR, APTT    HCG (If Applicable): No results found for: PREGTESTUR, PREGSERUM, HCG, HCGQUANT     ABGs: No results found for: PHART, PO2ART, DRM5VJU, JEA8HJT, BEART, V3INBLKM     Type & Screen (If Applicable):  No results found for: LABABO, LABRH    Drug/Infectious Status (If Applicable):  No results found for: HIV, HEPCAB    COVID-19 Screening (If Applicable): No results found for: COVID19        Anesthesia Evaluation  Patient summary reviewed and Nursing notes reviewed no history of anesthetic complications:   Airway: Mallampati: II  TM distance: >3 FB   Neck ROM: full  Mouth opening: > = 3 FB   Dental:    (+) upper dentures      Pulmonary:Negative Pulmonary ROS                              Cardiovascular:        Dysrhythmias: Hx of bladder CA. Neuro/Psych:   Negative Neuro/Psych ROS              GI/Hepatic/Renal:   (+) renal disease: CRI,           Endo/Other:    (+) : arthritis:., malignancy/cancer. Abdominal:             Vascular: Other Findings:           Anesthesia Plan      general     ASA 1    (78-year-old female presents for EXCISION OF CHRONIC WOUND OF THE LEFT GROIN. Plan general anesthesia with ASA standard monitors. Questions answered. Patient agreeable with anesthetic plan.  )  Induction: intravenous. Anesthetic plan and risks discussed with patient. Plan discussed with CRNA.     Attending anesthesiologist reviewed and agrees with Ulices Saul MD   2/28/2023

## 2023-02-28 NOTE — OP NOTE
Operative Note      Patient: Noel Parks  YOB: 1940  MRN: [id number]    Date of Procedure: 2/28/2023    Pre-Op Diagnosis: Chronic wound infection of abdomen, subsequent encounter [S31.109D, L08.9]    Post-Op Diagnosis: Same       Procedure(s):  EXCISION OF CHRONIC WOUND OF THE LEFT GROIN    Surgeon(s):  Arturo Little MD    Assistant:   Resident: Roberto Spears MD    Anesthesia: Monitor Anesthesia Care    Estimated Blood Loss (mL): Minimal    Complications: None    Specimens:   ID Type Source Tests Collected by Time Destination   A : left groin wound Tissue Tissue SURGICAL PATHOLOGY Arturo Little MD 2/28/2023 1120        Implants:  * No implants in log *      Drains: * No LDAs found *    Findings: chronic left groin found tunneling medially, 0 ethibond sutures x2 to approximate defect, packed with aquacel AG with gauze and tape overlying. Elliptical incision about 4.5 cm long by 2 cm at widest aspect    Detailed Description of Procedure: The patient was seen in the pre-procedure Room. The risks, benefits, complications, treatment options, and expected outcomes were again discussed with the patient. The possibilities of reaction to medication, bleeding, infection, the need for additional procedures, failure to diagnose a condition, and creating a complication operation were discussed with the patient. The patient concurred with the proposed plan, giving informed consent. The site of surgery properly noted/marked. The patient was brought to the operating room and positioned supine. Prophylactic antibiotics were administered and monitored anesthesia care was begun. The right flank was prepped and draped in the usual sterile fashion, and a time out was called. One-half percent Marcaine with epinephrine was used to anesthetize the skin surrounding an approximately 2 cm lesion. An elliptical incision was made over the left groin lesion adjacent to inguinal crease. Electrocautery and blunt dissection were used to dissect around the diseased subcutaneous fat which tunneled medially and about 4 cm deep. Specimen of diseased subcutaneous fat and ellipse of skin was passed off the field. Final wound bed measured 5.5cm long by 2 cm at widest point of ellipse. Hemostasis was achieved with cautery. Two 0 ethibond sutures were used to approximate the deep layer of resulting wound defect. The remaining wound bed was packed with Aquacel AG gauze, dry gauze was placed overlying and secured with paper tape. At the end of the operation, all sponge, instrument, and needle counts were correct. The patient tolerated the procedure well and was taken to the PACU in stable condition. Dr. Ivan Long was present and scrubbed for all critical portions of the procedure.      Electronically signed by Dee Abarca MD on 2/28/2023 at 3:41 PM

## 2023-02-28 NOTE — PROGRESS NOTES
1238 Pt wide awake A&O X 4 requesting to let her walk to RR pt accepted that RN help her her for standby assist she urinated a second time with out issue returned to stretcher and wants to dress self and go home.  Patient transfered to [facility] for discharge

## 2023-02-28 NOTE — ANESTHESIA POSTPROCEDURE EVALUATION
Department of Anesthesiology  Postprocedure Note    Patient: Portia Waggoner  MRN: [id number]  YOB: 1940  Date of evaluation: 2/28/2023      Procedure Summary     Date: 02/28/23 Room / Location: [place] [address] / [facility]    Anesthesia Start: 1032 Anesthesia Stop: 1128    Procedure: EXCISION OF CHRONIC WOUND OF THE LEFT GROIN (Left: Groin) Diagnosis:       Chronic wound infection of abdomen, subsequent encounter      (Chronic wound infection of abdomen, subsequent encounter [S31.109D, L08.9])    Surgeons: Elias Caballero MD Responsible Provider: Gio Millan MD    Anesthesia Type: general ASA Status: 3          Anesthesia Type: No value filed.    Ana María Phase I: Ana María Score: 9    Ana María Phase II:        Anesthesia Post Evaluation    Patient location during evaluation: PACU  Patient participation: complete - patient participated  Level of consciousness: awake and alert  Pain score: 0  Airway patency: patent  Nausea & Vomiting: no nausea and no vomiting  Complications: no  Cardiovascular status: hemodynamically stable  Respiratory status: acceptable  Hydration status: euvolemic

## 2023-02-28 NOTE — PROGRESS NOTES
Patient admitted to PACU # 08 from OR at 1133 post EXCISION OF CHRONIC WOUND OF THE LEFT GROIN - Left per Dr. Modesto Wright. Attached to PACU monitoring system and report received from anesthesia provider. Patient was reported to be hemodynamically stable during procedure. Patient drowsy on admission and denied pain. Pt on RA. L groin surgical drsg c/d/I with gauze and medipore tape. Pt NSR on monitor. Will continue to monitor.

## 2023-02-28 NOTE — FLOWSHEET NOTE
Ambulatory Surgery/Procedure Discharge Note    Vitals:    02/28/23 1324   BP: (!) 162/89   Pulse: 63   Resp: 16   Temp: (!) 96.7 °F (35.9 °C)   SpO2: 100%   Pt meets discharge criteria per Ana María score. In: 1050 [I.V.:1000]  Out: -     Restroom use offered before discharge. Yes    Pain assessment:  none  Pain Level: 0    Pt and S.O./family states \"ready to go home\". Pt alert and oriented x4. IV removed. Denies N/V or pain. Dressing to left groin c/d/I. Discharge instructions given to pt and son with pt permission. Pt and son verbalized understanding of all instructions. Left with all belongings, 1 prescription, and discharge instructions. Patient discharged to home/self care.  Patient discharged via wheel chair by transporter to waiting family/S.O.       2/28/2023 1:27 PM

## 2023-02-28 NOTE — FLOWSHEET NOTE
Pt was doing well and speaking with writer. SDS bed requested, pt was disconnected and then went unresponsive. VSS. Pt was reconnected to monitor. Dr. Juan Garcia was called to bedside. Pt blood glucose 88. Pt then woke up and wanted to go to restroom demanding she walked. Pt assisted to restroom by writer and another RN, Manuel Tristan. Pt assisted back safely to stretcher. Pt got in bed and another episode of not responding to staff. Pt connected to monitors and are stable. Pt son, Nelli Gallardo called by Manuel Tristan RN who said pt has \"reaction after surgery\". Pt connected to monitors and will continue to monitor pt.

## 2023-03-01 ENCOUNTER — NURSE ONLY (OUTPATIENT)
Dept: SURGERY | Age: 83
End: 2023-03-01

## 2023-03-01 ENCOUNTER — TELEPHONE (OUTPATIENT)
Dept: SURGERY | Age: 83
End: 2023-03-01

## 2023-03-01 DIAGNOSIS — L08.9 CHRONIC WOUND INFECTION OF ABDOMEN, SUBSEQUENT ENCOUNTER: Primary | ICD-10-CM

## 2023-03-01 DIAGNOSIS — S31.109D CHRONIC WOUND INFECTION OF ABDOMEN, SUBSEQUENT ENCOUNTER: Primary | ICD-10-CM

## 2023-03-01 NOTE — PROGRESS NOTES
Right groin 0 s/s of infection. Wound flushed with NS and packed with a collagen dressing and covered with gauze and a mediplex dressing. Educated patient to leave dressing in place until Friday's post op appt. Informed patient's son that if the dressing comes off again to call office and we can get her another nurse visit.

## 2023-03-01 NOTE — TELEPHONE ENCOUNTER
Patient called and stated she pulled out the packing from her surgery yesterday. MD advise and is having patient come in for a nurse visit to repack the wound.

## 2023-03-03 ENCOUNTER — HOSPITAL ENCOUNTER (EMERGENCY)
Age: 83
Discharge: LEFT BEFORE DISCHARGE | End: 2023-03-03
Payer: MEDICARE

## 2023-03-03 ENCOUNTER — HOSPITAL ENCOUNTER (EMERGENCY)
Age: 83
Discharge: HOME | End: 2023-03-03
Attending: EMERGENCY MEDICINE
Payer: MEDICARE

## 2023-03-03 ENCOUNTER — TELEPHONE (OUTPATIENT)
Dept: SURGERY | Age: 83
End: 2023-03-03

## 2023-03-03 VITALS
HEIGHT: 60 IN | OXYGEN SATURATION: 96 % | SYSTOLIC BLOOD PRESSURE: 156 MMHG | TEMPERATURE: 97.9 F | BODY MASS INDEX: 20.42 KG/M2 | WEIGHT: 104 LBS | DIASTOLIC BLOOD PRESSURE: 85 MMHG | HEART RATE: 86 BPM | RESPIRATION RATE: 19 BRPM

## 2023-03-03 VITALS
HEIGHT: 61 IN | RESPIRATION RATE: 16 BRPM | TEMPERATURE: 98.7 F | BODY MASS INDEX: 19.9 KG/M2 | HEART RATE: 92 BPM | OXYGEN SATURATION: 100 % | WEIGHT: 105.4 LBS | SYSTOLIC BLOOD PRESSURE: 131 MMHG | DIASTOLIC BLOOD PRESSURE: 77 MMHG

## 2023-03-03 DIAGNOSIS — N82.0 VESICOVAGINAL FISTULA: ICD-10-CM

## 2023-03-03 DIAGNOSIS — T83.9XXA PROBLEM WITH FOLEY CATHETER, INITIAL ENCOUNTER (HCC): Primary | ICD-10-CM

## 2023-03-03 DIAGNOSIS — S31.109A WOUND OF LEFT GROIN, INITIAL ENCOUNTER: ICD-10-CM

## 2023-03-03 DIAGNOSIS — Z76.89 ENCOUNTER FOR EVALUATION OF FOLEY CATHETER: Primary | ICD-10-CM

## 2023-03-03 PROCEDURE — 51702 INSERT TEMP BLADDER CATH: CPT

## 2023-03-03 PROCEDURE — 99281 EMR DPT VST MAYX REQ PHY/QHP: CPT

## 2023-03-03 PROCEDURE — 99283 EMERGENCY DEPT VISIT LOW MDM: CPT

## 2023-03-03 ASSESSMENT — ENCOUNTER SYMPTOMS
DIARRHEA: 0
ABDOMINAL PAIN: 0
SHORTNESS OF BREATH: 0
NAUSEA: 0
COUGH: 0
VOMITING: 0

## 2023-03-03 ASSESSMENT — PAIN DESCRIPTION - ONSET: ONSET: SUDDEN

## 2023-03-03 ASSESSMENT — LIFESTYLE VARIABLES
HOW MANY STANDARD DRINKS CONTAINING ALCOHOL DO YOU HAVE ON A TYPICAL DAY: 1 OR 2
HOW OFTEN DO YOU HAVE A DRINK CONTAINING ALCOHOL: MONTHLY OR LESS

## 2023-03-03 ASSESSMENT — PAIN DESCRIPTION - DESCRIPTORS: DESCRIPTORS: DISCOMFORT

## 2023-03-03 ASSESSMENT — PAIN SCALES - GENERAL: PAINLEVEL_OUTOF10: 3

## 2023-03-03 ASSESSMENT — PAIN DESCRIPTION - LOCATION: LOCATION: ABDOMEN

## 2023-03-03 ASSESSMENT — PAIN - FUNCTIONAL ASSESSMENT
PAIN_FUNCTIONAL_ASSESSMENT: NONE - DENIES PAIN
PAIN_FUNCTIONAL_ASSESSMENT: ACTIVITIES ARE NOT PREVENTED
PAIN_FUNCTIONAL_ASSESSMENT: 0-10

## 2023-03-03 ASSESSMENT — PAIN DESCRIPTION - PAIN TYPE: TYPE: ACUTE PAIN

## 2023-03-03 NOTE — TELEPHONE ENCOUNTER
LM informed of the need to the ER. And have martinez placed and dressing changed. No need to keep todays appt.

## 2023-03-03 NOTE — DISCHARGE INSTRUCTIONS
You were seen in the emergency department for a catheter placement. This will help keep your urine out of your newly cleaned out wound. Please follow-up in Dr. Rosie Ornelas clinic again on 3/6 as scheduled. Call 911 or go to the nearest Emergency Department immediately for worsening symptoms, difficulty breathing, chest pain, lightheadedness, difficulty moving or talking, sensory loss, difficulty controlling your bowel or bladder function, altered level of consciousness, neck stiffness, uncontrollable nausea or vomiting, uncontrollable pain, inability to tolerate oral intake, fever, chills, severe bleeding, signs of infection (spreading redness, area feels very warm, swelling, pain, foul-smelling drainage), suicidal or homicidal ideation, or any other concerns. If we have prescribed you any new medications, please take them as prescribed and read the drug package insert carefully. Do not drink alcohol, drive, or operate machinery if you are taking narcotic pain medications.

## 2023-03-03 NOTE — ED NOTES
Patient prepared for and ready to be discharged. Patient discharged at this time in no acute distress after verbalizing understanding of discharge instructions. Patient left after receiving After Visit Summary instructions.       Zion Rhodes RN  03/03/23 8322

## 2023-03-04 NOTE — ED NOTES
No leaking noted from martinez catheter at insertion site/ bag connection/ or outlet site at this time. PA notified.    Pt son sts that since everything seems to be working \" we are just going to leave now\"     Celeste Thibodeaux RN  03/03/23 4698

## 2023-03-04 NOTE — ED PROVIDER NOTES
[address] [facility] [address] ENCOUNTER          PHYSICIAN ASSISTANT NOTE       Date of evaluation: 3/3/2023    Chief Complaint     Other (Pt believes her martinez has failed)      History of Present Illness     Liliana Wileks is a 78 y.o. female with a history of bladder cancer, status post excision of chronic wound of the left groin who currently has a Martinez catheter following the procedure. She comes to the emergency department with her son reporting that there was some leakage from the bottom of her Martinez catheter today and they wanted to ensure that it is correctly in place. She does note some discomfort since the surgery, but denies any fevers, nausea, vomiting, pain elsewhere. Her son does have some concern with her being able to operate the Martinez catheter with regard to draining it. They both deny shortness of breath, chest pain, pain with urination, bowel changes. Review of Systems     Review of Systems   Constitutional:  Negative for chills and fever. Respiratory:  Negative for cough and shortness of breath. Cardiovascular:  Negative for chest pain. Gastrointestinal:  Negative for abdominal pain, diarrhea, nausea and vomiting. Genitourinary:  Negative for dysuria. Neurological:  Negative for headaches. Past Medical, Surgical, Family, and Social History     She has a past medical history of Arthritis, Bladder cancer ([address] Chronic renal failure, stage 3 (moderate), unspecified whether stage 3a or 3b CKD ([address] Kidney stone, and Osteoporosis. She has a past surgical history that includes Bladder surgery (N/A) and lymph node biopsy (Left, 2/28/2023). Her family history is not on file. She reports that she has quit smoking. Her smoking use included cigarettes. She has never used smokeless tobacco. She reports current alcohol use of about 1.0 standard drink per week. She reports that she does not use drugs.     Medications     Discharge Medication List as of 3/3/2023 11:30 PM CONTINUE these medications which have NOT CHANGED    Details   cetirizine (ZYRTEC) 10 MG tablet Take 10 mg by mouth dailyHistorical Med      oxyCODONE (ROXICODONE) 5 MG immediate release tablet Take 1 tablet by mouth every 6 hours as needed for Pain for up to 3 days. Intended supply: 7 days. Take lowest dose possible to manage pain Max Daily Amount: 20 mg, Disp-12 tablet, R-0Print      denosumab (PROLIA) 60 MG/ML SOLN SC injection Inject 60 mg into the skin onceHistorical Med      Calcium Carbonate-Vitamin D (CALCIUM + D PO) Take by mouthHistorical Med      Ascorbic Acid (VITAMIN C) 500 MG tablet Take 500 mg by mouth dailyHistorical Med      Multiple Vitamins-Minerals (THERAPEUTIC MULTIVITAMIN-MINERALS) tablet Take 1 tablet by mouth dailyHistorical Med             Allergies     She has No Known Allergies. Physical Exam     INITIAL VITALS: BP: 131/77, Temp: 98.7 °F (37.1 °C), Heart Rate: 92, Resp: 16, SpO2: 100 %  Physical Exam  Constitutional:       Appearance: Normal appearance. HENT:      Head: Normocephalic and atraumatic. Cardiovascular:      Rate and Rhythm: Normal rate and regular rhythm. Pulses: Normal pulses. Heart sounds: Normal heart sounds. No murmur heard. No friction rub. Pulmonary:      Effort: Pulmonary effort is normal.   Abdominal:      Comments: Moreno catheter is draining urine into a bag with no leakage present. The patient's recent surgical site is well-appearing and without signs of infection including purulence, fluctuance, induration, surrounding erythema. There is tenderness to palpation around the area. Musculoskeletal:         General: Normal range of motion. Cervical back: Normal range of motion. Skin:     General: Skin is warm and dry. Neurological:      Mental Status: She is alert and oriented to person, place, and time.    Psychiatric:         Mood and Affect: Mood normal.         Behavior: Behavior normal.       Diagnostic Results RADIOLOGY:  No orders to display       LABS:   No results found for this visit on 03/03/23. ED BEDSIDE ULTRASOUND:  No results found. RECENT VITALS:  BP: 131/77, Temp: 98.7 °F (37.1 °C), Heart Rate: 92, Resp: 16, SpO2: 100 %     Procedures         ED Course     Nursing Notes, Past Medical Hx,Past Surgical Hx, Social Hx, Allergies, and Family Hx were reviewed. The patient was given the following medications:  No orders of the defined types were placed in this encounter. CONSULTS:  None    MEDICAL DECISION MAKING / ASSESSMENT / Salvatore Amirah is a 78 y.o. female with a history of bladder cancer, status post excision of chronic wound of the left groin who currently has a Moreno catheter following the procedure. She comes to the emergency department with her son reporting that there was some leakage from the bottom of her Moreno catheter today and they wanted to ensure that it is correctly in place. She does note some discomfort since the surgery, but denies any fevers, nausea, vomiting, pain elsewhere. Her son does have some concern with her being able to operate the Moreno catheter with regard to draining it. They both deny shortness of breath, chest pain, pain with urination, bowel changes. She is alert and oriented x4. Vital signs are stable. On initial exam the Moreno catheter is draining urine into a bag with no leakage present. I was able to view the patient's recent surgical site which looks well-appearing and without signs of infection including purulence, fluctuance, induration, surrounding erythema. There is tenderness to palpation around the area. I departed the room without reviewing the Moreno catheter insertion site until able to view it with a chaperone. The nurse was able to view it and relayed to me that she saw no leakage, the catheter appeared to be inserted into the urethra and there were no other abnormal findings.     The patient and her family member eloped prior to me being able to go back in and review this as well as reassess the patient. I had no further contact with the patient subsequent to the initial exam and assessment detailed above. This patient was also evaluated by the attending physician. All care plans were discussed and agreed upon. Clinical Impression     1. Problem with Moreno catheter, [facility]        Disposition     PATIENT REFERRED TO:  No follow-up provider specified.     DISCHARGE MEDICATIONS:  Discharge Medication List as of 3/3/2023 11:30 PM          DISPOSITION Eloped - Left Before Treatment Complete 03/03/2023 11:29:49 PM        [facility], [facility]  03/03/23 1501 [address], [place]-C  03/03/23 3949

## 2023-03-06 ENCOUNTER — OFFICE VISIT (OUTPATIENT)
Dept: SURGERY | Age: 83
End: 2023-03-06

## 2023-03-06 DIAGNOSIS — Z09 POSTOP CHECK: Primary | ICD-10-CM

## 2023-03-06 PROCEDURE — 99024 POSTOP FOLLOW-UP VISIT: CPT | Performed by: SURGERY

## 2023-03-06 NOTE — PROGRESS NOTES
PATIENT NAME: Mahendra Noonan     YOB: 1940     TODAY'S DATE: 3/6/2023    Reason for Visit:  Draining site in the left groin    Requesting Physician:  Dr. Amanda Stephenson:              The patient is a 78 y.o. female with a PMHx as delineated below who presents for follow up without complaints. Chief Complaint   Patient presents with    Post-Op Check     Excision of chronic wound of left groin 2/22/23       REVIEW OF SYSTEMS:  CONSTITUTIONAL:  negative  HEENT:  negative  RESPIRATORY:  negative  CARDIOVASCULAR:  negative  GASTROINTESTINAL:  negative  GENITOURINARY:  negative  HEMATOLOGIC/LYMPHATIC:  negative  MUSCULOSKELETAL: negative  NEUROLOGICAL:  negative    PMH  Past Medical History:   Diagnosis Date    Arthritis     Bladder cancer ([facility]     Chronic renal failure, stage 3 (moderate), unspecified whether stage 3a or 3b CKD ([facility]     Kidney stone     Osteoporosis        PSH  Past Surgical History:   Procedure Laterality Date    BLADDER SURGERY N/A     r/t bladder cancer    LYMPH NODE BIOPSY Left 2/28/2023    EXCISION OF CHRONIC WOUND OF THE LEFT GROIN performed by Gatito Rodriguez MD at [facility] [address] [facility] History  Social History     Socioeconomic History    Marital status:      Spouse name: Not on file    Number of children: Not on file    Years of education: Not on file    Highest education level: Not on file   Occupational History    Not on file   Tobacco Use    Smoking status: Former     Types: Cigarettes    Smokeless tobacco: Never   Substance and Sexual Activity    Alcohol use:  Yes     Alcohol/week: 1.0 standard drink     Types: 1 Glasses of wine per week     Comment: rarely    Drug use: Never    Sexual activity: Not on file   Other Topics Concern    Not on file   Social History Narrative    Not on file     Social Determinants of Health     Financial Resource Strain: Not on file   Food Insecurity: Not on file   Transportation Needs: Not on file   Physical Activity: Not on file   Stress: Not on file   Social Connections: Not on file   Intimate Partner Violence: Not on file   Housing Stability: Not on file       Family History:   No family history on file. Allergy: No Known Allergies    PHYSICAL EXAM:  VITALS:  There were no vitals taken for this visit. CONSTITUTIONAL:  alert, no apparent distress and normal weight  EYES:  sclera clear  ENT:  normocepalic, without obvious abnormality  NECK:  supple, symmetrical, trachea midline and no carotid bruits  LUNGS:  clear to auscultation  CARDIOVASCULAR:  regular rate and rhythm and no murmur noted  ABDOMEN:  no scars, normal bowel sounds, soft, non-distended, non-tender, voluntary guarding absent, no masses palpated and hernia absent  MUSCULOSKELETAL:  0+ pitting edema lower extremities  NEUROLOGIC:  Mental Status Exam:  Level of Alertness:   awake  Orientation:   person, place, time  SKIN:  Over the left medial groin, the incision is open with good granulation tissue noted    IMPRESSION/RECOMMENDATIONS:    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 The drainage is much less follow martinez placement. Her wound is healing well and wound care instructions were given. I will see her back in the office in 2 weeks. She is scheduled to follow up with urology for work up of this fistula.     Pardeep Licea

## 2023-03-17 ENCOUNTER — OFFICE VISIT (OUTPATIENT)
Dept: SURGERY | Age: 83
End: 2023-03-17

## 2023-03-17 DIAGNOSIS — Z09 POSTOP CHECK: Primary | ICD-10-CM

## 2023-03-17 PROCEDURE — 99024 POSTOP FOLLOW-UP VISIT: CPT | Performed by: SURGERY

## 2023-03-21 LAB
BUN SERPL-MCNC: 16 MG/DL (ref 7–20)
CREAT SERPL-MCNC: 1.5 MG/DL (ref 0.6–1.2)
GFR SERPLBLD CREATININE-BSD FMLA CKD-EPI: 34 ML/MIN/{1.73_M2}

## 2023-03-22 NOTE — PROGRESS NOTES
PATIENT NAME: Samra Staley     YOB: 1940     TODAY'S DATE: 3/22/2023    Reason for Visit:  Draining site in the left groin    Requesting Physician:  Dr. Nubia Marcial:              The patient is a 78 y.o. female with a PMHx as delineated below who presents for follow up without complaints. Chief Complaint   Patient presents with    Follow-up    Post-Op Check     Wound draining        REVIEW OF SYSTEMS:  CONSTITUTIONAL:  negative  HEENT:  negative  RESPIRATORY:  negative  CARDIOVASCULAR:  negative  GASTROINTESTINAL:  negative  GENITOURINARY:  negative  HEMATOLOGIC/LYMPHATIC:  negative  MUSCULOSKELETAL: negative  NEUROLOGICAL:  negative    PMH  Past Medical History:   Diagnosis Date    Arthritis     Bladder cancer ([facility]     Chronic renal failure, stage 3 (moderate), unspecified whether stage 3a or 3b CKD ([facility]     Kidney stone     Osteoporosis        PSH  Past Surgical History:   Procedure Laterality Date    BLADDER SURGERY N/A     r/t bladder cancer    LYMPH NODE BIOPSY Left 2/28/2023    EXCISION OF CHRONIC WOUND OF THE LEFT GROIN performed by Tod Cox MD at [address] [place] [address] History  Social History     Socioeconomic History    Marital status:      Spouse name: Not on file    Number of children: Not on file    Years of education: Not on file    Highest education level: Not on file   Occupational History    Not on file   Tobacco Use    Smoking status: Former     Types: Cigarettes    Smokeless tobacco: Never   Substance and Sexual Activity    Alcohol use:  Yes     Alcohol/week: 1.0 standard drink     Types: 1 Glasses of wine per week     Comment: rarely    Drug use: Never    Sexual activity: Not on file   Other Topics Concern    Not on file   Social History Narrative    Not on file     Social Determinants of Health     Financial Resource Strain: Not on file   Food Insecurity: Not on file   Transportation Needs: Not on file   Physical Activity: Not on file

## 2023-04-03 ENCOUNTER — TELEPHONE (OUTPATIENT)
Dept: SURGERY | Age: 83
End: 2023-04-03

## 2023-04-03 NOTE — TELEPHONE ENCOUNTER
Patient called in stating that there is an area on her wound that has a \"white head\" on it    Please contact the patient at [phone]

## 2023-04-06 ENCOUNTER — HOSPITAL ENCOUNTER (EMERGENCY)
Age: 83
Discharge: HOME | End: 2023-04-06
Attending: EMERGENCY MEDICINE

## 2023-04-06 VITALS
BODY MASS INDEX: 19.69 KG/M2 | RESPIRATION RATE: 14 BRPM | WEIGHT: 104.2 LBS | TEMPERATURE: 98 F | OXYGEN SATURATION: 99 % | DIASTOLIC BLOOD PRESSURE: 65 MMHG | HEART RATE: 86 BPM | SYSTOLIC BLOOD PRESSURE: 144 MMHG

## 2023-04-06 DIAGNOSIS — T83.9XXA PROBLEM WITH FOLEY CATHETER, INITIAL ENCOUNTER (HCC): Primary | ICD-10-CM

## 2023-04-06 ASSESSMENT — PAIN - FUNCTIONAL ASSESSMENT: PAIN_FUNCTIONAL_ASSESSMENT: NONE - DENIES PAIN

## 2023-04-06 NOTE — ED NOTES
Educated pt on discharge paperwork as well as follow-up appointment. Pt verbalizes understanding of all instructions and denies questions. Pt left ambulatory by self with all personal belongings, and discharge paperwork to private residence. Pt in no distress at this time.        Ann-Marie Perez RN  04/06/23 0362

## 2023-04-06 NOTE — ED NOTES
Pt's leg bag changed as home leg bag disconnected. Pt provided with education on use and care of leg bag. Pt verbalizes understanding and denies questions.      Black De Los Santos RN  04/06/23 8561

## 2023-04-06 NOTE — ED PROVIDER NOTES
[address] ENCOUNTER          ATTENDING PHYSICIAN NOTE       Date of evaluation: 4/6/2023    Chief Complaint     Urinary Catheter (Pt to ED with CC of \"wetness\" on her legs since yesterday. Pt states that while at the store she started noting the wetness and woke up this AM with her sheets and pajamas wet. Pt states she has had a urinary catheter x1-2 weeks. At triage, catheter noted to be disconnected from leg bag. Leg bag replaced.)      History of Present Illness     Brandt Morales is a 78 y.o. 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 She presents this morning to the emergency department, because she awoke and her bed clothes were all wet. She put on her clothing for the day, and her pants have become wet over the course of the morning, and she is concerned that there is a problem with the catheter. She otherwise has no complaints, and states that the wound is healing well, denies any pain in the groin area or abdomen, denies any fevers or chills, nausea or vomiting. Review of Systems     Review of Systems   All other systems reviewed and are negative. Past Medical, Surgical, Family, and Social History     She has a past medical history of Arthritis, Bladder cancer ([address] Chronic renal failure, stage 3 (moderate), unspecified whether stage 3a or 3b CKD ([address] Kidney stone, and Osteoporosis. She has a past surgical history that includes Bladder surgery (N/A) and lymph node biopsy (Left, 2/28/2023). Her family history is not on file. She reports that she has quit smoking. Her smoking use included cigarettes. She has never used smokeless tobacco. She reports current alcohol use of about 1.0 standard drink per week. She reports that she does not use drugs.     Medications

## 2023-04-06 NOTE — DISCHARGE INSTRUCTIONS
As we discussed, the wetness that you experienced this morning seems to be because the Moreno catheter tubing was not appropriately connected to the leg bag. The leg bag was replaced, and seems to be appropriately draining. Please continue to care for your Moreno catheter as you have previously been taught. Please follow-up with Dr. Lynette Shirley, your surgeon, at your previously scheduled appointment tomorrow at 10 AM.  Call your doctor, or return to the emergency department, for worsening symptoms or other concerns.

## 2023-04-07 ENCOUNTER — OFFICE VISIT (OUTPATIENT)
Dept: SURGERY | Age: 83
End: 2023-04-07

## 2023-04-07 DIAGNOSIS — Z09 POSTOP CHECK: Primary | ICD-10-CM

## 2023-04-07 PROCEDURE — 99024 POSTOP FOLLOW-UP VISIT: CPT | Performed by: SURGERY

## 2023-04-08 ENCOUNTER — HOSPITAL ENCOUNTER (EMERGENCY)
Age: 83
Discharge: HOME | End: 2023-04-08
Attending: EMERGENCY MEDICINE
Payer: MEDICARE

## 2023-04-08 ENCOUNTER — HOSPITAL ENCOUNTER (EMERGENCY)
Age: 83
Discharge: HOME | End: 2023-04-08
Attending: STUDENT IN AN ORGANIZED HEALTH CARE EDUCATION/TRAINING PROGRAM
Payer: MEDICARE

## 2023-04-08 VITALS
BODY MASS INDEX: 20.03 KG/M2 | WEIGHT: 106 LBS | SYSTOLIC BLOOD PRESSURE: 151 MMHG | TEMPERATURE: 98.1 F | HEART RATE: 98 BPM | DIASTOLIC BLOOD PRESSURE: 96 MMHG | RESPIRATION RATE: 22 BRPM | OXYGEN SATURATION: 99 %

## 2023-04-08 VITALS
DIASTOLIC BLOOD PRESSURE: 77 MMHG | RESPIRATION RATE: 18 BRPM | BODY MASS INDEX: 20.62 KG/M2 | TEMPERATURE: 98.2 F | SYSTOLIC BLOOD PRESSURE: 102 MMHG | WEIGHT: 105 LBS | HEART RATE: 95 BPM | OXYGEN SATURATION: 100 % | HEIGHT: 60 IN

## 2023-04-08 DIAGNOSIS — T83.9XXA PROBLEM WITH FOLEY CATHETER, INITIAL ENCOUNTER (HCC): Primary | ICD-10-CM

## 2023-04-08 DIAGNOSIS — T83.9XXD FOLEY CATHETER PROBLEM, SUBSEQUENT ENCOUNTER: Primary | ICD-10-CM

## 2023-04-08 PROCEDURE — 51702 INSERT TEMP BLADDER CATH: CPT

## 2023-04-08 PROCEDURE — 99282 EMERGENCY DEPT VISIT SF MDM: CPT

## 2023-04-08 ASSESSMENT — LIFESTYLE VARIABLES
HOW OFTEN DO YOU HAVE A DRINK CONTAINING ALCOHOL: NEVER
HOW MANY STANDARD DRINKS CONTAINING ALCOHOL DO YOU HAVE ON A TYPICAL DAY: PATIENT DOES NOT DRINK

## 2023-04-08 ASSESSMENT — ENCOUNTER SYMPTOMS
SHORTNESS OF BREATH: 0
VOMITING: 0
DIARRHEA: 0
NAUSEA: 0

## 2023-04-08 ASSESSMENT — PAIN - FUNCTIONAL ASSESSMENT
PAIN_FUNCTIONAL_ASSESSMENT: NONE - DENIES PAIN
PAIN_FUNCTIONAL_ASSESSMENT: NONE - DENIES PAIN

## 2023-04-08 NOTE — DISCHARGE INSTRUCTIONS
You were seen in the emergency department for leakage from your Moreno catheter. The connections were tightened. You should follow-up with your PCP as needed.     You should return to the emergency department if:  -Your Moreno stops draining urine  -You have pain associated with your catheter  -You have any other symptoms you find concerning

## 2023-04-08 NOTE — ED NOTES
Discharge instructions reviewed without questions. No further needs voiced at this time. Ambulatory from department in stable condition.        Isatu Guevara RN  04/08/23 2013

## 2023-04-08 NOTE — ED NOTES
Pt seen yesterday for same issue. Leg bag was disconnected from urinary catheter upon arrival. Explained this was the reason urine was draining spontaneously from catheter to patient, appears to understand this. Bag re-attached with urine now draining into bag, re-enforced with tape. Pt denies any other issues, not wishing to be seen for any other complaints. Educated on proper use of leg bag, pt verbalized understanding.      Chris Jerome RN  04/08/23 9934

## 2023-04-08 NOTE — ED PROVIDER NOTES
[address] [facility]          ATTENDING PHYSICIAN NOTE       Date of evaluation: 4/8/2023    Chief Complaint     Other (Leg bag disconnected)    History of Present Illness     Estella Milner is a 78 y.o. female who presents with a urinary catheter problem. Patient states this morning she noticed some leakage from her Moreno tubing. She has no associated pain. She has not noticed any blood in her urine. The catheter is otherwise been functioning normally. On chart review, she has multiple ED visits for similar issues with her Moreno. ASSESSMENT / PLAN  (MEDICAL DECISION MAKING)     INITIAL VITALS: BP: (!) 151/96, Temp: 98.1 °F (36.7 °C), Heart Rate: 98, Resp: 22, SpO2: 99 %      Estella Milner is a 78 y.o. female who presented with a urinary catheter problem. On initial triage, the RN noted that the connection to her leg bag was loose. That was tightened and the bag subsequently drained appropriately without leakage of urine. Once her Moreno bag was reconnected, she was very eager for discharge and had to be encouraged to return to her room to allow me to examine her. On my assessment, she was well-appearing and in no acute distress. She had no abdominal tenderness to palpation and no suprapubic tenderness. She denied urinary symptoms to suggest infection. She was advised to follow-up with her PCP for further care. At this time, patient has been deemed safe for discharge. Discharge instructions, including strict return precautions for new or worsening symptoms, have been communicated. Is this patient to be included in the SEP-1 core measure due to severe sepsis or septic shock? No Exclusion criteria - the patient is NOT to be included for SEP-1 Core Measure due to: Infection is not suspected    Medical Decision Making    Clinical Impression     1.  Problem with Moreno catheter, initial encounter [facility])        Disposition     PATIENT REFERRED TO:  Radha Handy

## 2023-04-09 NOTE — ED PROVIDER NOTES
[address] [facility]          ATTENDING PHYSICIAN NOTE       Date of evaluation: 4/8/2023    Chief Complaint     Urinary Catheter      History of Present Illness     Jeni Hwang is a 78 y.o. female with history of vesicovaginal fistula presenting for leaking from her Moreno catheter bag. Has been seen multiple times for this recently and was seen earlier today for the same. She went home, fell asleep, and woke to leakage from her bag again. Has no other acute medical complaints. Physical Exam     INITIAL VITALS: BP: 102/77, Temp: 98.2 °F (36.8 °C), Heart Rate: (!) 119, Resp: 18, SpO2: 100 %     Physical Exam    Nursing note and vitals reviewed. Alert, no distress. Respirations even and unlabored. Moreno catheter with leg bag noted attached to right leg. No active leakage currently noted      ASSESSMENT / PLAN  (Dash Navarrete)   Jeni Hwang is a 78 y.o. female presenting for leakage from her Moreno bag. Nontoxic-appearing, vital signs stable. Nursing was able to troubleshoot her Moreno and replace the bag without further leakage. Discharged in stable condition. Medical Decision Making  Amount and/or Complexity of Data Reviewed  External Data Reviewed: notes. Clinical Impression     1. Moreno catheter problem, subsequent encounter        Disposition     PATIENT REFERRED TO:  No follow-up provider specified. DISCHARGE MEDICATIONS:  Discharge Medication List as of 4/8/2023 10:17 PM          DISPOSITION Decision To Discharge 04/08/2023 10:21:45 Atilio Dubois MD                 Diagnostic Results and Other Data       RADIOLOGY:  No orders to display       LABS:   No results found for this visit on 04/08/23.     MOST RECENT VITALS:  BP: 102/77,Temp: 98.2 °F (36.8 °C), Heart Rate: 95, Resp: 18, SpO2: 100 %     Procedures     Procedures    ED Course     Nursing Notes, Past Medical Hx, Past Surgical Hx, Social Hx,Allergies, and Family Hx

## 2023-04-24 NOTE — PROGRESS NOTES
PATIENT NAME: Justyn Ty     YOB: 1940     TODAY'S DATE: 4/24/2023    Reason for Visit:  Draining site in the left groin    Requesting Physician:  Dr. Reza Greenberg:              The patient is a 78 y.o. female with a PMHx as delineated below who presents for follow up without complaints. Chief Complaint   Patient presents with    Post-Op Check     Excision of chronic wound of left groin 2/22/23       REVIEW OF SYSTEMS:  CONSTITUTIONAL:  negative  HEENT:  negative  RESPIRATORY:  negative  CARDIOVASCULAR:  negative  GASTROINTESTINAL:  negative  GENITOURINARY:  negative  HEMATOLOGIC/LYMPHATIC:  negative  MUSCULOSKELETAL: negative  NEUROLOGICAL:  negative    PMH  Past Medical History:   Diagnosis Date    Arthritis     Bladder cancer ([facility] [address]     Chronic renal failure, stage 3 (moderate), unspecified whether stage 3a or 3b CKD ([facility] [address]     Kidney stone     Osteoporosis        PSH  Past Surgical History:   Procedure Laterality Date    BLADDER SURGERY N/A     r/t bladder cancer    LYMPH NODE BIOPSY Left 2/28/2023    EXCISION OF CHRONIC WOUND OF THE LEFT GROIN performed by Shila Sever, MD at [facility] [address] [facility] History  Social History     Socioeconomic History    Marital status:      Spouse name: Not on file    Number of children: Not on file    Years of education: Not on file    Highest education level: Not on file   Occupational History    Not on file   Tobacco Use    Smoking status: Former     Types: Cigarettes    Smokeless tobacco: Never   Substance and Sexual Activity    Alcohol use:  Yes     Alcohol/week: 1.0 standard drink     Types: 1 Glasses of wine per week     Comment: rarely    Drug use: Never    Sexual activity: Not on file   Other Topics Concern    Not on file   Social History Narrative    Not on file     Social Determinants of Health     Financial Resource Strain: Not on file   Food Insecurity: Not on file   Transportation Needs: Not on file   Physical

## 2023-06-02 ENCOUNTER — HOSPITAL ENCOUNTER (EMERGENCY)
Age: 83
Discharge: HOME | End: 2023-06-02
Attending: EMERGENCY MEDICINE
Payer: MEDICARE

## 2023-06-02 VITALS
SYSTOLIC BLOOD PRESSURE: 144 MMHG | RESPIRATION RATE: 16 BRPM | TEMPERATURE: 98.4 F | OXYGEN SATURATION: 100 % | WEIGHT: 105.9 LBS | DIASTOLIC BLOOD PRESSURE: 84 MMHG | HEART RATE: 99 BPM | BODY MASS INDEX: 20.68 KG/M2

## 2023-06-02 DIAGNOSIS — T83.9XXA PROBLEM WITH FOLEY CATHETER, INITIAL ENCOUNTER (HCC): Primary | ICD-10-CM

## 2023-06-02 PROCEDURE — 99282 EMERGENCY DEPT VISIT SF MDM: CPT

## 2023-06-02 ASSESSMENT — PAIN - FUNCTIONAL ASSESSMENT: PAIN_FUNCTIONAL_ASSESSMENT: NONE - DENIES PAIN

## 2023-06-02 NOTE — ED PROVIDER NOTES
the skin once    MULTIPLE VITAMINS-MINERALS (THERAPEUTIC MULTIVITAMIN-MINERALS) TABLET    Take 1 tablet by mouth daily       Allergies     She has No Known Allergies. Physical Exam     INITIAL VITALS: BP: (!) 144/84, Temp: 98.4 °F (36.9 °C), Pulse: 99, Respirations: 16, SpO2: 100 %     General: Slender, short statured, elderly patient, very well-appearing and younger appearing than her stated age. She is very pleasantly and animatedly conversational, and in no acute distress. HEENT: Pupils are equal, round, and reactive to light. Extraocular muscles are intact. Conjunctivae are clear and moist. No redness or drainage from the eyes. No drainage from the nose. The oropharynx appeared to be normal.    Neck: Supple, with full range of motion. Cardiovascular:  2+ radial pulses bilaterally. Respiratory: Unlabored breathing with equal chest rise and fall. Abdomen: Soft and nontender, without guarding or rebound tenderness. No masses or hepatosplenomegaly. Skin: Warm and dry, without rashes or ecchymoses, lacerations or abrasions. Neuro: Alert and oriented x3. No focal neurologic deficits are noted. Extremities: Warm and well-perfused, without clubbing, cyanosis, or edema. The patient moves all extremities equally. On the right lower extremity there is a leg bag, which is somewhat crumpled into a ball behind her knee, with the elastic bands malpositioned, and which is leaking from its distal blue cap. Psych: The patient's mood and affect are generally within normal limits for their presentation. Diagnostic Results     RADIOLOGY:  No orders to display       LABS:   No results found for this visit on 06/02/23. RECENT VITALS:  BP: (!) 144/84, Temp: 98.4 °F (36.9 °C), Pulse: 99, Respirations: 16, SpO2: 100 %     Procedures       ED Course     Nursing Notes, Past Medical Hx, Past Surgical Hx, Social Hx, Allergies, and Family Hx were reviewed.     The patient was given the following

## 2023-06-02 NOTE — DISCHARGE INSTRUCTIONS
As discussed, please continue to follow-up with Dr. Jhonathan Holcomb regarding your Moreno catheter management, and upcoming nephrostomy tube. Call your doctor, or return to the emergency department, for any pain relating to the catheter, abdominal pain, fevers, vomiting, or other concerning symptoms.

## 2023-06-19 ENCOUNTER — HOSPITAL ENCOUNTER (EMERGENCY)
Age: 83
Discharge: HOME | End: 2023-06-19
Attending: EMERGENCY MEDICINE
Payer: MEDICARE

## 2023-06-19 VITALS
HEIGHT: 60 IN | HEART RATE: 107 BPM | SYSTOLIC BLOOD PRESSURE: 148 MMHG | RESPIRATION RATE: 18 BRPM | OXYGEN SATURATION: 100 % | TEMPERATURE: 98.3 F | WEIGHT: 105 LBS | DIASTOLIC BLOOD PRESSURE: 76 MMHG | BODY MASS INDEX: 20.62 KG/M2

## 2023-06-19 DIAGNOSIS — Z46.6 ENCOUNTER FOR REPLACEMENT OF URINARY CATHETER: Primary | ICD-10-CM

## 2023-06-19 LAB
BACTERIA URNS QL MICRO: ABNORMAL /HPF
BILIRUB UR QL STRIP.AUTO: ABNORMAL
CLARITY UR: ABNORMAL
COLOR UR: YELLOW
EPI CELLS #/AREA URNS HPF: ABNORMAL /HPF (ref 0–5)
GLUCOSE UR STRIP.AUTO-MCNC: NEGATIVE MG/DL
HGB UR QL STRIP.AUTO: ABNORMAL
KETONES UR STRIP.AUTO-MCNC: ABNORMAL MG/DL
LEUKOCYTE ESTERASE UR QL STRIP.AUTO: ABNORMAL
MUCOUS THREADS #/AREA URNS LPF: ABNORMAL /LPF
NITRITE UR QL STRIP.AUTO: NEGATIVE
PH UR STRIP.AUTO: 6.5 [PH] (ref 5–8)
PROT UR STRIP.AUTO-MCNC: 100 MG/DL
RBC #/AREA URNS HPF: ABNORMAL /HPF (ref 0–4)
SP GR UR STRIP.AUTO: 1.01 (ref 1–1.03)
UA COMPLETE W REFLEX CULTURE PNL UR: YES
UA DIPSTICK W REFLEX MICRO PNL UR: YES
URN SPEC COLLECT METH UR: ABNORMAL
UROBILINOGEN UR STRIP-ACNC: 0.2 E.U./DL
WBC #/AREA URNS HPF: >100 /HPF (ref 0–5)

## 2023-06-19 PROCEDURE — 81001 URINALYSIS AUTO W/SCOPE: CPT

## 2023-06-19 PROCEDURE — 51702 INSERT TEMP BLADDER CATH: CPT

## 2023-06-19 PROCEDURE — 6370000000 HC RX 637 (ALT 250 FOR IP): Performed by: STUDENT IN AN ORGANIZED HEALTH CARE EDUCATION/TRAINING PROGRAM

## 2023-06-19 PROCEDURE — 87086 URINE CULTURE/COLONY COUNT: CPT

## 2023-06-19 PROCEDURE — 99283 EMERGENCY DEPT VISIT LOW MDM: CPT

## 2023-06-19 RX ORDER — SULFAMETHOXAZOLE AND TRIMETHOPRIM 800; 160 MG/1; MG/1
1 TABLET ORAL ONCE
Status: COMPLETED | OUTPATIENT
Start: 2023-06-19 | End: 2023-06-19

## 2023-06-19 RX ORDER — SULFAMETHOXAZOLE AND TRIMETHOPRIM 800; 160 MG/1; MG/1
1 TABLET ORAL 2 TIMES DAILY
Qty: 14 TABLET | Refills: 0 | Status: SHIPPED | OUTPATIENT
Start: 2023-06-19 | End: 2023-06-26

## 2023-06-19 RX ADMIN — SULFAMETHOXAZOLE AND TRIMETHOPRIM 1 TABLET: 800; 160 TABLET ORAL at 11:42

## 2023-06-19 ASSESSMENT — PAIN - FUNCTIONAL ASSESSMENT: PAIN_FUNCTIONAL_ASSESSMENT: NONE - DENIES PAIN

## 2023-06-19 NOTE — DISCHARGE INSTRUCTIONS
Please see the attached instructions for how to best care for yourself and when you should return to the emergency department. Please call your primary care physician and general surgery to schedule a follow-up appointment for soon as possible. As we discussed, I would call Dr. Dioni Mathur, general surgery, to schedule a follow-up appointment. When you call them, you can let them know that there is a picture of your fistula in the chart from today's visit. I have given you a prescription for Bactrim. Please take this as prescribed. When should you call for help? Call your doctor now or seek immediate medical care if:    You have symptoms of a urinary infection. These may include:  Pain or burning when you urinate. A frequent need to urinate without being able to pass much urine. Pain in the flank, which is just below the rib cage and above the waist on either side of the back. Blood in your urine. A fever. Your urine smells bad. You see large blood clots in your urine. No urine or very little urine is flowing into the bag for 4 or more hours. Watch closely for changes in your health, and be sure to contact your doctor if:    The area around the catheter becomes irritated, swollen, red, or tender, or there is pus draining from it. Urine is leaking from the place where the catheter enters your body.

## 2023-06-19 NOTE — ED PROVIDER NOTES
[address] [facility] [address] ENCOUNTER          PHYSICIAN ASSISTANT NOTE       Date of evaluation: 6/19/2023    Chief Complaint     Urinary Catheter (Pulled out urinary catheter this AM)      History of Present Illness     Evy Mcconnell is a 78 y.o. female who presents needing her urinary catheter reinserted. Patient states that earlier this morning she accidentally pulled out her urinary catheter while getting out of bed. Was having no problems before this. Denies fever, chills, nausea, vomiting, chest pain, shortness of breath, abdominal pain, or any changes to bowel or bladder habits. States that she has a chronic wound in her left groin that she is seeing general surgery for with no new or worsening symptoms and does not wish to have evaluation for this today. Review of Systems     Review of Systems see above for pertinent ROS. Past Medical, Surgical, Family, and Social History     She has a past medical history of Arthritis, Bladder cancer ([facility] [address] Chronic renal failure, stage 3 (moderate), unspecified whether stage 3a or 3b CKD ([address] Kidney stone, and Osteoporosis. She has a past surgical history that includes Bladder surgery (N/A) and lymph node biopsy (Left, 2/28/2023). Her family history is not on file. She reports that she has quit smoking. Her smoking use included cigarettes. She has never used smokeless tobacco. She reports that she does not currently use alcohol after a past usage of about 1.0 standard drink per week. She reports that she does not use drugs.     Medications     Discharge Medication List as of 6/19/2023 11:37 AM        CONTINUE these medications which have NOT CHANGED    Details   cetirizine (ZYRTEC) 10 MG tablet Take 1 tablet by mouth dailyHistorical Med      denosumab (PROLIA) 60 MG/ML SOLN SC injection Inject 1 mL into the skin onceHistorical Med      Calcium Carbonate-Vitamin D (CALCIUM + D PO) Take by mouthHistorical Med      Ascorbic Acid (VITAMIN C) 500

## 2023-06-19 NOTE — ED PROVIDER NOTES
ED Attending Attestation Note     Date of evaluation: 6/19/2023    This patient was seen by the advance practice provider. I have seen and examined the patient, agree with the workup, evaluation, management and diagnosis. The care plan has been discussed. My assessment reveals adult female having removed her catheter accidentally somehow this morning. No other complaints at this point. She is in her normal state of health. Moreno catheter was replaced by nursing staff. There is number of white cells, unclear if this is chronic colonization versus acute infection so we will go and treat try Bactrim given potential skin mria with her fistula.   Otherwise well in appearnce       Denis Hector MD  06/19/23 [id number]

## 2023-06-20 LAB — BACTERIA UR CULT: NORMAL

## 2023-08-01 ENCOUNTER — ANESTHESIA EVENT (OUTPATIENT)
Dept: OPERATING ROOM | Age: 83
End: 2023-08-01
Payer: MEDICARE

## 2023-08-01 ENCOUNTER — APPOINTMENT (OUTPATIENT)
Dept: CT IMAGING | Age: 83
DRG: 659 | End: 2023-08-01
Attending: UROLOGY
Payer: MEDICARE

## 2023-08-01 ENCOUNTER — HOSPITAL ENCOUNTER (INPATIENT)
Age: 83
LOS: 12 days | Discharge: HOME HEALTH | DRG: 659 | End: 2023-08-15
Attending: UROLOGY | Admitting: UROLOGY
Payer: MEDICARE

## 2023-08-01 ENCOUNTER — ANESTHESIA (OUTPATIENT)
Dept: OPERATING ROOM | Age: 83
End: 2023-08-01
Payer: MEDICARE

## 2023-08-01 ENCOUNTER — HOSPITAL ENCOUNTER (OUTPATIENT)
Dept: INTERVENTIONAL RADIOLOGY/VASCULAR | Age: 83
Discharge: HOME | End: 2023-08-01
Payer: MEDICARE

## 2023-08-01 DIAGNOSIS — N20.0 CALCULUS OF KIDNEY: ICD-10-CM

## 2023-08-01 LAB
BASOPHILS # BLD: 0.1 K/UL (ref 0–0.2)
BASOPHILS NFR BLD: 0.8 %
DEPRECATED RDW RBC AUTO: 14.6 % (ref 12.4–15.4)
EOSINOPHIL # BLD: 0.3 K/UL (ref 0–0.6)
EOSINOPHIL NFR BLD: 3.9 %
HCT VFR BLD AUTO: 33.1 % (ref 36–48)
HGB BLD-MCNC: 11 G/DL (ref 12–16)
INR PPP: 1.16 (ref 0.84–1.16)
LYMPHOCYTES # BLD: 2.7 K/UL (ref 1–5.1)
LYMPHOCYTES NFR BLD: 35.3 %
MCH RBC QN AUTO: 29.4 PG (ref 26–34)
MCHC RBC AUTO-ENTMCNC: 33.3 G/DL (ref 31–36)
MCV RBC AUTO: 88.3 FL (ref 80–100)
MONOCYTES # BLD: 0.6 K/UL (ref 0–1.3)
MONOCYTES NFR BLD: 7.7 %
NEUTROPHILS # BLD: 4 K/UL (ref 1.7–7.7)
NEUTROPHILS NFR BLD: 52.3 %
PLATELET # BLD AUTO: 282 K/UL (ref 135–450)
PMV BLD AUTO: 7.1 FL (ref 5–10.5)
PROTHROMBIN TIME: 14.8 SEC (ref 11.5–14.8)
RBC # BLD AUTO: 3.74 M/UL (ref 4–5.2)
WBC # BLD AUTO: 7.7 K/UL (ref 4–11)

## 2023-08-01 PROCEDURE — 2580000003 HC RX 258: Performed by: FAMILY MEDICINE

## 2023-08-01 PROCEDURE — 6370000000 HC RX 637 (ALT 250 FOR IP): Performed by: UROLOGY

## 2023-08-01 PROCEDURE — 85025 COMPLETE CBC W/AUTO DIFF WBC: CPT

## 2023-08-01 PROCEDURE — 74176 CT ABD & PELVIS W/O CONTRAST: CPT

## 2023-08-01 PROCEDURE — 6360000002 HC RX W HCPCS: Performed by: UROLOGY

## 2023-08-01 PROCEDURE — 3600000004 HC SURGERY LEVEL 4 BASE: Performed by: UROLOGY

## 2023-08-01 PROCEDURE — C1769 GUIDE WIRE: HCPCS

## 2023-08-01 PROCEDURE — C1758 CATHETER, URETERAL: HCPCS | Performed by: UROLOGY

## 2023-08-01 PROCEDURE — 0TC34ZZ EXTIRPATION OF MATTER FROM RIGHT KIDNEY PELVIS, PERCUTANEOUS ENDOSCOPIC APPROACH: ICD-10-PCS | Performed by: UROLOGY

## 2023-08-01 PROCEDURE — C1769 GUIDE WIRE: HCPCS | Performed by: UROLOGY

## 2023-08-01 PROCEDURE — 36415 COLL VENOUS BLD VENIPUNCTURE: CPT

## 2023-08-01 PROCEDURE — 3600000014 HC SURGERY LEVEL 4 ADDTL 15MIN: Performed by: UROLOGY

## 2023-08-01 PROCEDURE — 2500000003 HC RX 250 WO HCPCS

## 2023-08-01 PROCEDURE — 3700000000 HC ANESTHESIA ATTENDED CARE: Performed by: UROLOGY

## 2023-08-01 PROCEDURE — 3700000001 HC ADD 15 MINUTES (ANESTHESIA): Performed by: UROLOGY

## 2023-08-01 PROCEDURE — 6360000002 HC RX W HCPCS: Performed by: ANESTHESIOLOGY

## 2023-08-01 PROCEDURE — A4217 STERILE WATER/SALINE, 500 ML: HCPCS | Performed by: UROLOGY

## 2023-08-01 PROCEDURE — 0T9330Z DRAINAGE OF RIGHT KIDNEY PELVIS WITH DRAINAGE DEVICE, PERCUTANEOUS APPROACH: ICD-10-PCS | Performed by: RADIOLOGY

## 2023-08-01 PROCEDURE — G0378 HOSPITAL OBSERVATION PER HR: HCPCS

## 2023-08-01 PROCEDURE — 88300 SURGICAL PATH GROSS: CPT

## 2023-08-01 PROCEDURE — 7100000001 HC PACU RECOVERY - ADDTL 15 MIN: Performed by: UROLOGY

## 2023-08-01 PROCEDURE — 6360000002 HC RX W HCPCS

## 2023-08-01 PROCEDURE — 7100000000 HC PACU RECOVERY - FIRST 15 MIN: Performed by: UROLOGY

## 2023-08-01 PROCEDURE — 85610 PROTHROMBIN TIME: CPT

## 2023-08-01 PROCEDURE — C1887 CATHETER, GUIDING: HCPCS

## 2023-08-01 PROCEDURE — 2709999900 HC NON-CHARGEABLE SUPPLY: Performed by: UROLOGY

## 2023-08-01 PROCEDURE — 82365 CALCULUS SPECTROSCOPY: CPT

## 2023-08-01 PROCEDURE — C9399 UNCLASSIFIED DRUGS OR BIOLOG: HCPCS

## 2023-08-01 PROCEDURE — C1894 INTRO/SHEATH, NON-LASER: HCPCS

## 2023-08-01 PROCEDURE — 2709999900 HC NON-CHARGEABLE SUPPLY

## 2023-08-01 PROCEDURE — 2580000003 HC RX 258: Performed by: UROLOGY

## 2023-08-01 PROCEDURE — 50437 DILAT XST TRC NEW ACCESS RCS: CPT

## 2023-08-01 PROCEDURE — C1726 CATH, BAL DIL, NON-VASCULAR: HCPCS | Performed by: UROLOGY

## 2023-08-01 PROCEDURE — 2720000010 HC SURG SUPPLY STERILE: Performed by: UROLOGY

## 2023-08-01 RX ORDER — LIDOCAINE HYDROCHLORIDE 20 MG/ML
INJECTION, SOLUTION EPIDURAL; INFILTRATION; INTRACAUDAL; PERINEURAL PRN
Status: DISCONTINUED | OUTPATIENT
Start: 2023-08-01 | End: 2023-08-01 | Stop reason: SDUPTHER

## 2023-08-01 RX ORDER — ONDANSETRON 2 MG/ML
INJECTION INTRAMUSCULAR; INTRAVENOUS PRN
Status: DISCONTINUED | OUTPATIENT
Start: 2023-08-01 | End: 2023-08-01 | Stop reason: SDUPTHER

## 2023-08-01 RX ORDER — SODIUM CHLORIDE 9 MG/ML
INJECTION, SOLUTION INTRAVENOUS PRN
Status: DISCONTINUED | OUTPATIENT
Start: 2023-08-01 | End: 2023-08-01 | Stop reason: HOSPADM

## 2023-08-01 RX ORDER — SODIUM CHLORIDE 0.9 % (FLUSH) 0.9 %
5-40 SYRINGE (ML) INJECTION PRN
Status: DISCONTINUED | OUTPATIENT
Start: 2023-08-01 | End: 2023-08-01 | Stop reason: HOSPADM

## 2023-08-01 RX ORDER — ACETAMINOPHEN 325 MG/1
650 TABLET ORAL EVERY 4 HOURS PRN
Status: DISCONTINUED | OUTPATIENT
Start: 2023-08-01 | End: 2023-08-01 | Stop reason: SDUPTHER

## 2023-08-01 RX ORDER — SUCCINYLCHOLINE/SOD CL,ISO/PF 100 MG/5ML
SYRINGE (ML) INTRAVENOUS PRN
Status: DISCONTINUED | OUTPATIENT
Start: 2023-08-01 | End: 2023-08-01 | Stop reason: SDUPTHER

## 2023-08-01 RX ORDER — MIDAZOLAM HYDROCHLORIDE 1 MG/ML
2 INJECTION INTRAMUSCULAR; INTRAVENOUS
Status: DISCONTINUED | OUTPATIENT
Start: 2023-08-01 | End: 2023-08-01 | Stop reason: HOSPADM

## 2023-08-01 RX ORDER — FENTANYL CITRATE 50 UG/ML
50 INJECTION, SOLUTION INTRAMUSCULAR; INTRAVENOUS EVERY 5 MIN PRN
Status: COMPLETED | OUTPATIENT
Start: 2023-08-01 | End: 2023-08-01

## 2023-08-01 RX ORDER — METOCLOPRAMIDE HYDROCHLORIDE 5 MG/ML
10 INJECTION INTRAMUSCULAR; INTRAVENOUS
Status: DISCONTINUED | OUTPATIENT
Start: 2023-08-01 | End: 2023-08-01 | Stop reason: HOSPADM

## 2023-08-01 RX ORDER — MORPHINE SULFATE 2 MG/ML
2 INJECTION, SOLUTION INTRAMUSCULAR; INTRAVENOUS
Status: DISCONTINUED | OUTPATIENT
Start: 2023-08-01 | End: 2023-08-15 | Stop reason: HOSPADM

## 2023-08-01 RX ORDER — LABETALOL HYDROCHLORIDE 5 MG/ML
10 INJECTION, SOLUTION INTRAVENOUS
Status: DISCONTINUED | OUTPATIENT
Start: 2023-08-01 | End: 2023-08-01 | Stop reason: HOSPADM

## 2023-08-01 RX ORDER — MAGNESIUM HYDROXIDE 1200 MG/15ML
LIQUID ORAL CONTINUOUS PRN
Status: DISCONTINUED | OUTPATIENT
Start: 2023-08-01 | End: 2023-08-01 | Stop reason: HOSPADM

## 2023-08-01 RX ORDER — SODIUM CHLORIDE 0.9 % (FLUSH) 0.9 %
5-40 SYRINGE (ML) INJECTION PRN
Status: DISCONTINUED | OUTPATIENT
Start: 2023-08-01 | End: 2023-08-15 | Stop reason: HOSPADM

## 2023-08-01 RX ORDER — POLYETHYLENE GLYCOL 3350 17 G/17G
17 POWDER, FOR SOLUTION ORAL DAILY
Status: DISCONTINUED | OUTPATIENT
Start: 2023-08-01 | End: 2023-08-15 | Stop reason: HOSPADM

## 2023-08-01 RX ORDER — SODIUM CHLORIDE 9 MG/ML
INJECTION, SOLUTION INTRAVENOUS PRN
Status: DISCONTINUED | OUTPATIENT
Start: 2023-08-01 | End: 2023-08-15 | Stop reason: HOSPADM

## 2023-08-01 RX ORDER — SODIUM CHLORIDE 0.9 % (FLUSH) 0.9 %
5-40 SYRINGE (ML) INJECTION EVERY 12 HOURS SCHEDULED
Status: DISCONTINUED | OUTPATIENT
Start: 2023-08-01 | End: 2023-08-01 | Stop reason: HOSPADM

## 2023-08-01 RX ORDER — SODIUM CHLORIDE, SODIUM LACTATE, POTASSIUM CHLORIDE, CALCIUM CHLORIDE 600; 310; 30; 20 MG/100ML; MG/100ML; MG/100ML; MG/100ML
INJECTION, SOLUTION INTRAVENOUS CONTINUOUS
Status: DISCONTINUED | OUTPATIENT
Start: 2023-08-01 | End: 2023-08-02

## 2023-08-01 RX ORDER — CETIRIZINE HYDROCHLORIDE 10 MG/1
10 TABLET ORAL DAILY
Status: DISCONTINUED | OUTPATIENT
Start: 2023-08-01 | End: 2023-08-04

## 2023-08-01 RX ORDER — FENTANYL CITRATE 50 UG/ML
INJECTION, SOLUTION INTRAMUSCULAR; INTRAVENOUS PRN
Status: DISCONTINUED | OUTPATIENT
Start: 2023-08-01 | End: 2023-08-01 | Stop reason: SDUPTHER

## 2023-08-01 RX ORDER — ACETAMINOPHEN 160 MG/5ML
650 SOLUTION ORAL EVERY 4 HOURS PRN
Status: DISCONTINUED | OUTPATIENT
Start: 2023-08-01 | End: 2023-08-03

## 2023-08-01 RX ORDER — PROPOFOL 10 MG/ML
INJECTION, EMULSION INTRAVENOUS PRN
Status: DISCONTINUED | OUTPATIENT
Start: 2023-08-01 | End: 2023-08-01 | Stop reason: SDUPTHER

## 2023-08-01 RX ORDER — ROCURONIUM BROMIDE 10 MG/ML
INJECTION, SOLUTION INTRAVENOUS PRN
Status: DISCONTINUED | OUTPATIENT
Start: 2023-08-01 | End: 2023-08-01 | Stop reason: SDUPTHER

## 2023-08-01 RX ORDER — OXYCODONE HYDROCHLORIDE 5 MG/1
5 TABLET ORAL EVERY 4 HOURS PRN
Status: DISCONTINUED | OUTPATIENT
Start: 2023-08-01 | End: 2023-08-15 | Stop reason: HOSPADM

## 2023-08-01 RX ORDER — MORPHINE SULFATE 2 MG/ML
4 INJECTION, SOLUTION INTRAMUSCULAR; INTRAVENOUS
Status: DISCONTINUED | OUTPATIENT
Start: 2023-08-01 | End: 2023-08-15 | Stop reason: HOSPADM

## 2023-08-01 RX ORDER — OXYCODONE HYDROCHLORIDE 5 MG/1
10 TABLET ORAL EVERY 4 HOURS PRN
Status: DISCONTINUED | OUTPATIENT
Start: 2023-08-01 | End: 2023-08-15 | Stop reason: HOSPADM

## 2023-08-01 RX ORDER — SULFAMETHOXAZOLE AND TRIMETHOPRIM 800; 160 MG/1; MG/1
1 TABLET ORAL 2 TIMES DAILY
COMMUNITY
Start: 2023-07-21

## 2023-08-01 RX ORDER — SODIUM CHLORIDE 0.9 % (FLUSH) 0.9 %
5-40 SYRINGE (ML) INJECTION EVERY 12 HOURS SCHEDULED
Status: DISCONTINUED | OUTPATIENT
Start: 2023-08-01 | End: 2023-08-15 | Stop reason: HOSPADM

## 2023-08-01 RX ORDER — ONDANSETRON 2 MG/ML
4 INJECTION INTRAMUSCULAR; INTRAVENOUS
Status: DISCONTINUED | OUTPATIENT
Start: 2023-08-01 | End: 2023-08-01 | Stop reason: HOSPADM

## 2023-08-01 RX ORDER — CIPROFLOXACIN 2 MG/ML
400 INJECTION, SOLUTION INTRAVENOUS ONCE
Status: COMPLETED | OUTPATIENT
Start: 2023-08-01 | End: 2023-08-01

## 2023-08-01 RX ORDER — SODIUM CHLORIDE, SODIUM LACTATE, POTASSIUM CHLORIDE, CALCIUM CHLORIDE 600; 310; 30; 20 MG/100ML; MG/100ML; MG/100ML; MG/100ML
INJECTION, SOLUTION INTRAVENOUS CONTINUOUS
Status: DISCONTINUED | OUTPATIENT
Start: 2023-08-01 | End: 2023-08-01 | Stop reason: HOSPADM

## 2023-08-01 RX ORDER — ONDANSETRON 4 MG/1
4 TABLET, ORALLY DISINTEGRATING ORAL EVERY 8 HOURS PRN
Status: DISCONTINUED | OUTPATIENT
Start: 2023-08-01 | End: 2023-08-15 | Stop reason: HOSPADM

## 2023-08-01 RX ORDER — ONDANSETRON 2 MG/ML
4 INJECTION INTRAMUSCULAR; INTRAVENOUS EVERY 6 HOURS PRN
Status: DISCONTINUED | OUTPATIENT
Start: 2023-08-01 | End: 2023-08-15 | Stop reason: HOSPADM

## 2023-08-01 RX ADMIN — ROCURONIUM BROMIDE 5 MG: 10 INJECTION INTRAVENOUS at 08:34

## 2023-08-01 RX ADMIN — FENTANYL CITRATE 25 MCG: 50 INJECTION, SOLUTION INTRAMUSCULAR; INTRAVENOUS at 13:22

## 2023-08-01 RX ADMIN — FENTANYL CITRATE 25 MCG: 50 INJECTION, SOLUTION INTRAMUSCULAR; INTRAVENOUS at 10:05

## 2023-08-01 RX ADMIN — CIPROFLOXACIN 400 MG: 400 INJECTION, SOLUTION INTRAVENOUS at 08:39

## 2023-08-01 RX ADMIN — ROCURONIUM BROMIDE 30 MG: 10 INJECTION INTRAVENOUS at 10:36

## 2023-08-01 RX ADMIN — LIDOCAINE HYDROCHLORIDE 50 MG: 20 INJECTION, SOLUTION EPIDURAL; INFILTRATION; INTRACAUDAL; PERINEURAL at 08:34

## 2023-08-01 RX ADMIN — ACETAMINOPHEN 650 MG: 325 TABLET ORAL at 18:43

## 2023-08-01 RX ADMIN — HYDROMORPHONE HYDROCHLORIDE 0.25 MG: 1 INJECTION, SOLUTION INTRAMUSCULAR; INTRAVENOUS; SUBCUTANEOUS at 12:42

## 2023-08-01 RX ADMIN — SODIUM CHLORIDE, SODIUM LACTATE, POTASSIUM CHLORIDE, AND CALCIUM CHLORIDE: .6; .31; .03; .02 INJECTION, SOLUTION INTRAVENOUS at 18:14

## 2023-08-01 RX ADMIN — FENTANYL CITRATE 25 MCG: 50 INJECTION, SOLUTION INTRAMUSCULAR; INTRAVENOUS at 10:13

## 2023-08-01 RX ADMIN — ONDANSETRON 4 MG: 2 INJECTION INTRAMUSCULAR; INTRAVENOUS at 11:26

## 2023-08-01 RX ADMIN — SODIUM CHLORIDE, SODIUM LACTATE, POTASSIUM CHLORIDE, AND CALCIUM CHLORIDE: .6; .31; .03; .02 INJECTION, SOLUTION INTRAVENOUS at 08:30

## 2023-08-01 RX ADMIN — ROCURONIUM BROMIDE 45 MG: 10 INJECTION INTRAVENOUS at 08:38

## 2023-08-01 RX ADMIN — SUGAMMADEX 200 MG: 100 INJECTION, SOLUTION INTRAVENOUS at 11:33

## 2023-08-01 RX ADMIN — HYDROMORPHONE HYDROCHLORIDE 0.25 MG: 1 INJECTION, SOLUTION INTRAMUSCULAR; INTRAVENOUS; SUBCUTANEOUS at 13:55

## 2023-08-01 RX ADMIN — PROPOFOL 120 MG: 10 INJECTION, EMULSION INTRAVENOUS at 08:34

## 2023-08-01 RX ADMIN — FENTANYL CITRATE 25 MCG: 50 INJECTION, SOLUTION INTRAMUSCULAR; INTRAVENOUS at 13:12

## 2023-08-01 RX ADMIN — Medication 100 MG: at 08:34

## 2023-08-01 RX ADMIN — CEFTRIAXONE 1000 MG: 1 INJECTION, POWDER, FOR SOLUTION INTRAMUSCULAR; INTRAVENOUS at 21:10

## 2023-08-01 RX ADMIN — FENTANYL CITRATE 25 MCG: 50 INJECTION, SOLUTION INTRAMUSCULAR; INTRAVENOUS at 08:34

## 2023-08-01 RX ADMIN — PHENYLEPHRINE HYDROCHLORIDE 100 MCG: 10 INJECTION, SOLUTION INTRAMUSCULAR; INTRAVENOUS; SUBCUTANEOUS at 08:40

## 2023-08-01 RX ADMIN — FENTANYL CITRATE 25 MCG: 50 INJECTION, SOLUTION INTRAMUSCULAR; INTRAVENOUS at 09:01

## 2023-08-01 RX ADMIN — HYDROMORPHONE HYDROCHLORIDE 0.25 MG: 1 INJECTION, SOLUTION INTRAMUSCULAR; INTRAVENOUS; SUBCUTANEOUS at 13:05

## 2023-08-01 RX ADMIN — ACETAMINOPHEN 650 MG: 650 SOLUTION ORAL at 23:14

## 2023-08-01 ASSESSMENT — PAIN DESCRIPTION - LOCATION
LOCATION: FLANK
LOCATION: OTHER (COMMENT)
LOCATION: FLANK

## 2023-08-01 ASSESSMENT — PAIN SCALES - GENERAL
PAINLEVEL_OUTOF10: 0
PAINLEVEL_OUTOF10: 6
PAINLEVEL_OUTOF10: 4
PAINLEVEL_OUTOF10: 3
PAINLEVEL_OUTOF10: 4
PAINLEVEL_OUTOF10: 0
PAINLEVEL_OUTOF10: 0
PAINLEVEL_OUTOF10: 6
PAINLEVEL_OUTOF10: 6
PAINLEVEL_OUTOF10: 5

## 2023-08-01 ASSESSMENT — PAIN DESCRIPTION - ORIENTATION
ORIENTATION: RIGHT

## 2023-08-01 ASSESSMENT — PAIN DESCRIPTION - PAIN TYPE
TYPE: SURGICAL PAIN

## 2023-08-01 ASSESSMENT — PAIN DESCRIPTION - DESCRIPTORS
DESCRIPTORS: ACHING

## 2023-08-01 ASSESSMENT — LIFESTYLE VARIABLES: SMOKING_STATUS: 0

## 2023-08-01 ASSESSMENT — PAIN - FUNCTIONAL ASSESSMENT: PAIN_FUNCTIONAL_ASSESSMENT: 0-10

## 2023-08-01 NOTE — ANESTHESIA POSTPROCEDURE EVALUATION
Department of Anesthesiology  Postprocedure Note    Patient: Amy Ricks  MRN: [id number]  YOB: 1940  Date of evaluation: 8/1/2023      Procedure Summary     Date: 08/01/23 Room / Location: [address] [place] [address]    Anesthesia Start: 0830 Anesthesia Stop: 7839    Procedure: RIGHT PERCUTANEOUS NEPHROLITHOTOMY, INSERTION OF RIGHT PERCUTANEOUS NEPHROSTOMY TUBE, INSERTION OF GUIDEWIRE FOR PROCEDURE, POSSIBLE HOLMIUM LASER, CYSTOSCOPY (Right) Diagnosis:       Calculus of kidney      (Calculus of kidney [N20.0])    Surgeons: Francia Smith MD Responsible Provider: Jes Stewart MD    Anesthesia Type: general ASA Status: 3          Anesthesia Type: No value filed.     Ana María Phase I: Ana María Score: 8    Ana María Phase II:        Anesthesia Post Evaluation    Patient location during evaluation: PACU  Level of consciousness: awake and alert  Airway patency: patent  Nausea & Vomiting: no vomiting  Complications: no  Cardiovascular status: blood pressure returned to baseline  Respiratory status: acceptable  Hydration status: euvolemic  Multimodal analgesia pain management approach  Pain management: adequate

## 2023-08-01 NOTE — BRIEF OP NOTE
Brief Postoperative Note      Patient: Stephanie Walters  YOB: 1940  MRN: [id number]    Date of Procedure: 8/1/2023    Pre-Op Diagnosis Codes:     * Calculus of kidney [N20.0]    Post-Op Diagnosis: Same       Procedure(s):  RIGHT PERCUTANEOUS NEPHROLITHOTOMY, INSERTION OF RIGHT PERCUTANEOUS NEPHROSTOMY TUBE, INSERTION OF GUIDEWIRE FOR PROCEDURE, POSSIBLE HOLMIUM LASER, CYSTOSCOPY    Surgeon(s):  Angela Morin MD    Assistant:  * No surgical staff found *    Anesthesia: General    Estimated Blood Loss (mL): less than 760     Complications: None    Specimens:   ID Type Source Tests Collected by Time Destination   A : RIGHT KIDNEY STONE Tissue Tissue SURGICAL PATHOLOGY Angela Morin MD 8/1/2023 1112        Implants:  * No implants in log *      Drains:   [REMOVED] Urinary Catheter 06/19/23 Coude (Removed)       Findings:   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  WILL KEEP NPO AFTER MN IN CASE NT NEEDS REPOSITIONING. CONTINUE WEBB.     WITH REGARDS TO BLADDER, COULD CONSIDER SURGICAL REPAIR VS CYSTECTOMY VS CHRONIC WEBB VS PLACING SPT THRU FISTULA TRACK      Electronically signed by Angela Morin MD on 8/1/2023 at 11:46 AM

## 2023-08-02 ENCOUNTER — APPOINTMENT (OUTPATIENT)
Dept: CT IMAGING | Age: 83
DRG: 659 | End: 2023-08-02
Attending: UROLOGY
Payer: MEDICARE

## 2023-08-02 LAB
ALBUMIN SERPL-MCNC: 3 G/DL (ref 3.4–5)
ANION GAP SERPL CALCULATED.3IONS-SCNC: 11 MMOL/L (ref 3–16)
ANION GAP SERPL CALCULATED.3IONS-SCNC: 15 MMOL/L (ref 3–16)
BASOPHILS # BLD: 0 K/UL (ref 0–0.2)
BASOPHILS NFR BLD: 0.1 %
BUN SERPL-MCNC: 22 MG/DL (ref 7–20)
BUN SERPL-MCNC: 26 MG/DL (ref 7–20)
CALCIUM SERPL-MCNC: 8.2 MG/DL (ref 8.3–10.6)
CALCIUM SERPL-MCNC: 8.6 MG/DL (ref 8.3–10.6)
CHLORIDE SERPL-SCNC: 101 MMOL/L (ref 99–110)
CHLORIDE SERPL-SCNC: 104 MMOL/L (ref 99–110)
CO2 SERPL-SCNC: 18 MMOL/L (ref 21–32)
CO2 SERPL-SCNC: 20 MMOL/L (ref 21–32)
CREAT SERPL-MCNC: 1.7 MG/DL (ref 0.6–1.2)
CREAT SERPL-MCNC: 1.8 MG/DL (ref 0.6–1.2)
DEPRECATED RDW RBC AUTO: 14.6 % (ref 12.4–15.4)
EOSINOPHIL # BLD: 0 K/UL (ref 0–0.6)
EOSINOPHIL NFR BLD: 0.2 %
GFR SERPLBLD CREATININE-BSD FMLA CKD-EPI: 27 ML/MIN/{1.73_M2}
GFR SERPLBLD CREATININE-BSD FMLA CKD-EPI: 29 ML/MIN/{1.73_M2}
GLUCOSE BLD-MCNC: 102 MG/DL (ref 70–99)
GLUCOSE BLD-MCNC: 188 MG/DL (ref 70–99)
GLUCOSE BLD-MCNC: 89 MG/DL (ref 70–99)
GLUCOSE SERPL-MCNC: 114 MG/DL (ref 70–99)
GLUCOSE SERPL-MCNC: 203 MG/DL (ref 70–99)
HCT VFR BLD AUTO: 28.1 % (ref 36–48)
HGB BLD-MCNC: 9.3 G/DL (ref 12–16)
LACTATE BLDV-SCNC: 1.6 MMOL/L (ref 0.4–2)
LACTATE BLDV-SCNC: 2.8 MMOL/L (ref 0.4–2)
LACTATE BLDV-SCNC: 2.9 MMOL/L (ref 0.4–2)
LACTATE BLDV-SCNC: 5 MMOL/L (ref 0.4–2)
LYMPHOCYTES # BLD: 0.3 K/UL (ref 1–5.1)
LYMPHOCYTES NFR BLD: 3.6 %
MCH RBC QN AUTO: 29.7 PG (ref 26–34)
MCHC RBC AUTO-ENTMCNC: 33.3 G/DL (ref 31–36)
MCV RBC AUTO: 89.4 FL (ref 80–100)
MONOCYTES # BLD: 0.2 K/UL (ref 0–1.3)
MONOCYTES NFR BLD: 2.4 %
NEUTROPHILS # BLD: 7.6 K/UL (ref 1.7–7.7)
NEUTROPHILS NFR BLD: 93.7 %
PERFORMED ON: ABNORMAL
PERFORMED ON: ABNORMAL
PERFORMED ON: NORMAL
PHOSPHATE SERPL-MCNC: 2.5 MG/DL (ref 2.5–4.9)
PLATELET # BLD AUTO: 182 K/UL (ref 135–450)
PMV BLD AUTO: 6.7 FL (ref 5–10.5)
POTASSIUM SERPL-SCNC: 4.5 MMOL/L (ref 3.5–5.1)
POTASSIUM SERPL-SCNC: 5.3 MMOL/L (ref 3.5–5.1)
RBC # BLD AUTO: 3.14 M/UL (ref 4–5.2)
SODIUM SERPL-SCNC: 134 MMOL/L (ref 136–145)
SODIUM SERPL-SCNC: 135 MMOL/L (ref 136–145)
WBC # BLD AUTO: 8.2 K/UL (ref 4–11)

## 2023-08-02 PROCEDURE — G0378 HOSPITAL OBSERVATION PER HR: HCPCS

## 2023-08-02 PROCEDURE — 2580000003 HC RX 258: Performed by: INTERNAL MEDICINE

## 2023-08-02 PROCEDURE — 2580000003 HC RX 258: Performed by: UROLOGY

## 2023-08-02 PROCEDURE — 99222 1ST HOSP IP/OBS MODERATE 55: CPT | Performed by: INTERNAL MEDICINE

## 2023-08-02 PROCEDURE — 85025 COMPLETE CBC W/AUTO DIFF WBC: CPT

## 2023-08-02 PROCEDURE — 74176 CT ABD & PELVIS W/O CONTRAST: CPT

## 2023-08-02 PROCEDURE — 83036 HEMOGLOBIN GLYCOSYLATED A1C: CPT

## 2023-08-02 PROCEDURE — 6360000002 HC RX W HCPCS: Performed by: INTERNAL MEDICINE

## 2023-08-02 PROCEDURE — 83605 ASSAY OF LACTIC ACID: CPT

## 2023-08-02 PROCEDURE — 96361 HYDRATE IV INFUSION ADD-ON: CPT

## 2023-08-02 PROCEDURE — 87086 URINE CULTURE/COLONY COUNT: CPT

## 2023-08-02 PROCEDURE — 96367 TX/PROPH/DG ADDL SEQ IV INF: CPT

## 2023-08-02 PROCEDURE — 87040 BLOOD CULTURE FOR BACTERIA: CPT

## 2023-08-02 PROCEDURE — 6370000000 HC RX 637 (ALT 250 FOR IP): Performed by: UROLOGY

## 2023-08-02 PROCEDURE — 96365 THER/PROPH/DIAG IV INF INIT: CPT

## 2023-08-02 PROCEDURE — 36415 COLL VENOUS BLD VENIPUNCTURE: CPT

## 2023-08-02 PROCEDURE — 80069 RENAL FUNCTION PANEL: CPT

## 2023-08-02 RX ORDER — INSULIN GLARGINE 100 [IU]/ML
10 INJECTION, SOLUTION SUBCUTANEOUS NIGHTLY
Status: DISCONTINUED | OUTPATIENT
Start: 2023-08-02 | End: 2023-08-02

## 2023-08-02 RX ORDER — DEXTROSE MONOHYDRATE 100 MG/ML
INJECTION, SOLUTION INTRAVENOUS CONTINUOUS PRN
Status: DISCONTINUED | OUTPATIENT
Start: 2023-08-02 | End: 2023-08-15 | Stop reason: HOSPADM

## 2023-08-02 RX ORDER — INSULIN LISPRO 100 [IU]/ML
0-4 INJECTION, SOLUTION INTRAVENOUS; SUBCUTANEOUS NIGHTLY
Status: DISCONTINUED | OUTPATIENT
Start: 2023-08-02 | End: 2023-08-15 | Stop reason: HOSPADM

## 2023-08-02 RX ORDER — SODIUM CHLORIDE 9 MG/ML
INJECTION, SOLUTION INTRAVENOUS CONTINUOUS
Status: ACTIVE | OUTPATIENT
Start: 2023-08-02 | End: 2023-08-02

## 2023-08-02 RX ORDER — SODIUM CHLORIDE 9 MG/ML
INJECTION, SOLUTION INTRAVENOUS CONTINUOUS
Status: DISCONTINUED | OUTPATIENT
Start: 2023-08-02 | End: 2023-08-13

## 2023-08-02 RX ORDER — SODIUM CHLORIDE 9 MG/ML
INJECTION, SOLUTION INTRAVENOUS CONTINUOUS
Status: DISCONTINUED | OUTPATIENT
Start: 2023-08-02 | End: 2023-08-02

## 2023-08-02 RX ORDER — 0.9 % SODIUM CHLORIDE 0.9 %
500 INTRAVENOUS SOLUTION INTRAVENOUS ONCE
Status: DISCONTINUED | OUTPATIENT
Start: 2023-08-02 | End: 2023-08-02

## 2023-08-02 RX ORDER — 0.9 % SODIUM CHLORIDE 0.9 %
1000 INTRAVENOUS SOLUTION INTRAVENOUS ONCE
Status: COMPLETED | OUTPATIENT
Start: 2023-08-02 | End: 2023-08-02

## 2023-08-02 RX ORDER — INSULIN LISPRO 100 [IU]/ML
0-4 INJECTION, SOLUTION INTRAVENOUS; SUBCUTANEOUS
Status: DISCONTINUED | OUTPATIENT
Start: 2023-08-02 | End: 2023-08-15 | Stop reason: HOSPADM

## 2023-08-02 RX ADMIN — SODIUM CHLORIDE: 9 INJECTION, SOLUTION INTRAVENOUS at 09:27

## 2023-08-02 RX ADMIN — SODIUM CHLORIDE 1000 ML: 9 INJECTION, SOLUTION INTRAVENOUS at 11:22

## 2023-08-02 RX ADMIN — SODIUM CHLORIDE: 9 INJECTION, SOLUTION INTRAVENOUS at 16:57

## 2023-08-02 RX ADMIN — VANCOMYCIN HYDROCHLORIDE 1000 MG: 10 INJECTION, POWDER, LYOPHILIZED, FOR SOLUTION INTRAVENOUS at 11:24

## 2023-08-02 RX ADMIN — ACETAMINOPHEN 650 MG: 650 SOLUTION ORAL at 14:57

## 2023-08-02 RX ADMIN — CEFEPIME 2000 MG: 2 INJECTION, POWDER, FOR SOLUTION INTRAVENOUS at 09:30

## 2023-08-02 RX ADMIN — SODIUM CHLORIDE, POTASSIUM CHLORIDE, SODIUM LACTATE AND CALCIUM CHLORIDE: 600; 310; 30; 20 INJECTION, SOLUTION INTRAVENOUS at 05:15

## 2023-08-02 RX ADMIN — ACETAMINOPHEN 650 MG: 650 SOLUTION ORAL at 07:59

## 2023-08-02 NOTE — OP NOTE
Basically, we have difficulties getting down the  stenotic right UPJ and therefore we tried to set up [facility] ACCESS__. We placed  two wires in the upper pole and then dilated our tract using the two  wire technique. Once this is done, I am called into the room and we  break down the balloon and then looked our way in with our LithoClast.   It was not in proper position at first, we had to advance the tube more. Once this was done, we immediately found a very large stone. There was  a little bit of bleeding making visualization little bit difficult. At  any rate, I am able to localize the stone and we now burst the stone up  into multiple small fragments and used the ultrasonic probe to extract  all the fragments. At the conclusion of the case, I do feel that the  entire 3 cm stone has been removed. I pulled my sheath back and it  appears that we have removed all the stones. I replaced the balloon and  put the sheath back into the upper pole. I now do pan-nephroscopy using  the flexible cystoscope. I now attempted to place a wire in antegrade  manner. Unfortunately, this does not appear to be in the ureter. I  cannot identify the ureter __EVEN WITH ___ flexing my scope downward towards the  UPJ. Therefore, at this time, Dr. Tanika Groves is called back in and I assist  him in placing a 10-Gambian nephrostomy tube into the upper pole. At the  conclusion of the case, there is some extravasation, but we are  confident that the tube is in the upper pole. This is now stitched into  place and both the safety wires are removed. I should also mention that  during the cystoscopy, I could visualize a couple of erythematous  lesions on the posterior bladder wall, but I could not identify the  right ureteral orifice. These erythematous lesions could either be just  bullous edema from the chronic Moreno that has been in place or could  also be an urothelial carcinoma.   Therefore, I was unable to place

## 2023-08-02 NOTE — CONSULTS
Clinical Pharmacy Progress Note    Vancomycin - Management by Pharmacy    Consult Date(s): 8/2/23  Consulting Provider(s): Dr. Haresh Forrest / Plan  1)  UTI - Vancomycin  Concurrent Antimicrobials: Cefepime   Day of Vanc Therapy / Ordered Duration: 1 of 7  Current Dosing Method: Intermittent Dosing by Levels  Therapeutic Goal: Trough ~ 15 mg/L  Current Dose / Plan:   Pt's SCr today = 1.8; unclear if this is baseline vs ABNER. Will dose intermittently based on levels for now. Will give loading dose of 1000mg IV x1 today. Will check random level and f/u SCr in AM tomorrow. Will continue to monitor clinical condition and make adjustments to regimen as appropriate. Please call with questions--  Thanks--  Juan David Haider, PharmD, BCPS, BCGP  [id number] ([facility])   8/2/2023 10:19 AM      Interval update:  Pt spiking temps overnight (Tmax 103) with tachycardia. Cefepime / Yusuf Durie started and ID consulted. CT abdomen/pelvis ordered by . Subjective/Objective:   Lidia Lester is a 78 y.o. female with a PMHx significant for CKD 3, kidney stones, osteoporosis, and arthritis who is admitted with large right renal pelvic stone s/p Right PCNL (done 8/1). Per , surgery was difficult due to UPJ obstruction and vesicocutaneous fistula. Broad spectrum antibiotics were started 8/2 due to fevers / tachycardia overnight. Pharmacy is consulted to dose Vancomycin. Ht Readings from Last 1 Encounters:   08/01/23 5' (1.524 m)     Wt Readings from Last 1 Encounters:   08/01/23 103 lb 12.8 oz (47.1 kg)     Current & Prior Antimicrobial Regimen(s):   x1 pre-op (8/1)   (8/1-8/2)  Cefepime (8/2-current)  Vancomycin - Pharmacy to dose  Intermittent dosing (8/2-current)    Vancomycin Level(s) / Doses:    Date Time Dose Type of Level / Level Interpretation   8/2 11:00 1000mg x1 ordered  Intermittent dosing.  SCr 1.8.   8/3 06:00 -- Random = ordered    Note: Serum levels collected for AUC-based dosing may be high if

## 2023-08-02 NOTE — CONSULTS
Infectious Diseases   Consult Note      Reason for Consult: fevers after urologic procedure    Requesting Physician: Dr. Clyde Roberson     Date of Admission: 8/1/2023  Subjective:   CHIEF COMPLAINT: planned admission for  urologic surgery. HPI:  78-year-old -American female with history of urothelial cancer and vesicocutaneous fistula with chronic Moreno in place and 3 cm right-sided nephrolithiasis that presented on 8/1 with planned admission for OR for right PCNL. Patient went for cystoscopy, attempted right ureteral catheter placement, right percutaneous nephrolithotomy and placement of a nephrostomy tube yesterday with Dr. Clyde Roberson on 8/1. CT of the abdomen pelvis done on 8/1 showed staghorn calculus on the right renal pelvis measuring 3 x 2.7 cm with mild right-sided hydroureter and right-sided hydronephrosis. No distal ureteral calculus is detected, no left-sided nephrolithiasis. After the procedure the patient spiked temp up to 102 and was having rigors. She received a preop dose of ceftriaxone and Cipro and was later upgraded to vancomycin and cefepime. Review of prior urine cultures does not show any significant pathogen growth for normal urogenital mira the patient became a bit confused with the fevers and tachycardia but this is improving. Daughter (who lives in [facility] and is a nurse) is at bedside and states the patient has been on and off PO antibiotics for recurrent UTIs. She states last was on p.o. Bactrim. Blood and urine culture collected at the time of fevers yesterday have not shown any growth to date. She is currently awake alert and oriented, daughter is stating that opt is saying some usual things but is getting back to her baseline mentation. She currently has no fevers and no other complaints, repeated CT abd pelvis pending.                       Current abx: vanco and cefepime         Past Surgical History:       Diagnosis Date    Arthritis     Bladder cancer ([address])     Chronic

## 2023-08-03 PROBLEM — N20.0 NEPHROLITHIASIS: Status: ACTIVE | Noted: 2023-08-03

## 2023-08-03 LAB
ALBUMIN SERPL-MCNC: 2.4 G/DL (ref 3.4–5)
ANION GAP SERPL CALCULATED.3IONS-SCNC: 11 MMOL/L (ref 3–16)
BACTERIA UR CULT: NORMAL
BASOPHILS # BLD: 0 K/UL (ref 0–0.2)
BASOPHILS NFR BLD: 0.2 %
BUN SERPL-MCNC: 18 MG/DL (ref 7–20)
CALCIUM SERPL-MCNC: 8.1 MG/DL (ref 8.3–10.6)
CHLORIDE SERPL-SCNC: 107 MMOL/L (ref 99–110)
CO2 SERPL-SCNC: 17 MMOL/L (ref 21–32)
CREAT SERPL-MCNC: 1.3 MG/DL (ref 0.6–1.2)
DEPRECATED RDW RBC AUTO: 15.1 % (ref 12.4–15.4)
EOSINOPHIL # BLD: 0.1 K/UL (ref 0–0.6)
EOSINOPHIL NFR BLD: 1.9 %
EST. AVERAGE GLUCOSE BLD GHB EST-MCNC: 145.6 MG/DL
GFR SERPLBLD CREATININE-BSD FMLA CKD-EPI: 40 ML/MIN/{1.73_M2}
GLUCOSE BLD-MCNC: 114 MG/DL (ref 70–99)
GLUCOSE BLD-MCNC: 118 MG/DL (ref 70–99)
GLUCOSE BLD-MCNC: 146 MG/DL (ref 70–99)
GLUCOSE BLD-MCNC: 84 MG/DL (ref 70–99)
GLUCOSE BLD-MCNC: 94 MG/DL (ref 70–99)
GLUCOSE SERPL-MCNC: 94 MG/DL (ref 70–99)
HBA1C MFR BLD: 6.7 %
HCT VFR BLD AUTO: 31.1 % (ref 36–48)
HGB BLD-MCNC: 10 G/DL (ref 12–16)
LACTATE BLDV-SCNC: 1.8 MMOL/L (ref 0.4–2)
LYMPHOCYTES # BLD: 1.4 K/UL (ref 1–5.1)
LYMPHOCYTES NFR BLD: 18.6 %
MCH RBC QN AUTO: 29.4 PG (ref 26–34)
MCHC RBC AUTO-ENTMCNC: 32.1 G/DL (ref 31–36)
MCV RBC AUTO: 91.7 FL (ref 80–100)
MONOCYTES # BLD: 0.3 K/UL (ref 0–1.3)
MONOCYTES NFR BLD: 4.3 %
NEUTROPHILS # BLD: 5.7 K/UL (ref 1.7–7.7)
NEUTROPHILS NFR BLD: 75 %
PERFORMED ON: ABNORMAL
PERFORMED ON: NORMAL
PERFORMED ON: NORMAL
PHOSPHATE SERPL-MCNC: 2.7 MG/DL (ref 2.5–4.9)
PLATELET # BLD AUTO: 173 K/UL (ref 135–450)
PMV BLD AUTO: 7.1 FL (ref 5–10.5)
POTASSIUM SERPL-SCNC: 4.4 MMOL/L (ref 3.5–5.1)
RBC # BLD AUTO: 3.39 M/UL (ref 4–5.2)
SODIUM SERPL-SCNC: 135 MMOL/L (ref 136–145)
VANCOMYCIN SERPL-MCNC: 4.5 UG/ML
WBC # BLD AUTO: 7.6 K/UL (ref 4–11)

## 2023-08-03 PROCEDURE — 6370000000 HC RX 637 (ALT 250 FOR IP): Performed by: NURSE PRACTITIONER

## 2023-08-03 PROCEDURE — 2580000003 HC RX 258: Performed by: INTERNAL MEDICINE

## 2023-08-03 PROCEDURE — 85025 COMPLETE CBC W/AUTO DIFF WBC: CPT

## 2023-08-03 PROCEDURE — 6370000000 HC RX 637 (ALT 250 FOR IP): Performed by: UROLOGY

## 2023-08-03 PROCEDURE — 36415 COLL VENOUS BLD VENIPUNCTURE: CPT

## 2023-08-03 PROCEDURE — 96366 THER/PROPH/DIAG IV INF ADDON: CPT

## 2023-08-03 PROCEDURE — 96376 TX/PRO/DX INJ SAME DRUG ADON: CPT

## 2023-08-03 PROCEDURE — 96361 HYDRATE IV INFUSION ADD-ON: CPT

## 2023-08-03 PROCEDURE — 99232 SBSQ HOSP IP/OBS MODERATE 35: CPT | Performed by: INTERNAL MEDICINE

## 2023-08-03 PROCEDURE — 80202 ASSAY OF VANCOMYCIN: CPT

## 2023-08-03 PROCEDURE — 83605 ASSAY OF LACTIC ACID: CPT

## 2023-08-03 PROCEDURE — 1200000000 HC SEMI PRIVATE

## 2023-08-03 PROCEDURE — 6360000002 HC RX W HCPCS: Performed by: INTERNAL MEDICINE

## 2023-08-03 PROCEDURE — 80069 RENAL FUNCTION PANEL: CPT

## 2023-08-03 RX ORDER — ACETAMINOPHEN 160 MG/5ML
650 SOLUTION ORAL EVERY 4 HOURS PRN
Status: DISCONTINUED | OUTPATIENT
Start: 2023-08-03 | End: 2023-08-15 | Stop reason: HOSPADM

## 2023-08-03 RX ORDER — ACETAMINOPHEN 650 MG/1
650 SUPPOSITORY RECTAL EVERY 4 HOURS PRN
Status: DISCONTINUED | OUTPATIENT
Start: 2023-08-03 | End: 2023-08-15 | Stop reason: HOSPADM

## 2023-08-03 RX ADMIN — CEFEPIME 1000 MG: 1 INJECTION, POWDER, FOR SOLUTION INTRAMUSCULAR; INTRAVENOUS at 08:46

## 2023-08-03 RX ADMIN — ACETAMINOPHEN 650 MG: 650 SOLUTION ORAL at 00:08

## 2023-08-03 RX ADMIN — SODIUM CHLORIDE: 9 INJECTION, SOLUTION INTRAVENOUS at 21:39

## 2023-08-03 RX ADMIN — VANCOMYCIN HYDROCHLORIDE 750 MG: 10 INJECTION, POWDER, LYOPHILIZED, FOR SOLUTION INTRAVENOUS at 13:40

## 2023-08-03 RX ADMIN — SODIUM CHLORIDE: 9 INJECTION, SOLUTION INTRAVENOUS at 02:29

## 2023-08-03 RX ADMIN — ACETAMINOPHEN 650 MG: 650 SOLUTION ORAL at 14:58

## 2023-08-03 RX ADMIN — CETIRIZINE HYDROCHLORIDE 10 MG: 10 TABLET, FILM COATED ORAL at 08:27

## 2023-08-03 RX ADMIN — MEROPENEM 1000 MG: 1 INJECTION, POWDER, FOR SOLUTION INTRAVENOUS at 16:48

## 2023-08-03 ASSESSMENT — PAIN SCALES - GENERAL
PAINLEVEL_OUTOF10: 0
PAINLEVEL_OUTOF10: 0
PAINLEVEL_OUTOF10: 3
PAINLEVEL_OUTOF10: 0
PAINLEVEL_OUTOF10: 0

## 2023-08-03 NOTE — CONSULTS
[address] [facility]    H&P        Reason for consult:Fever post op    Requesting Physician:Dr Aaron Bird    History Obtained From:  patient,jjugher    HISTORY OF PRESENT ILLNESS:    The patient is a 78 y.o. female  with history of Urothelial cancer,  vesicocutaneous fistula and chronic martinez catheter.    -admitted with  staghorn calculi. Taken to the OR and had nephrostomy tube placement    Post-op,became febrile, more confused and lethargic post cytoscopy,attempted rt ureteral cath placement,rt nephrolithotomy and nephrostomy tube placement 8/1  Seen at bedside with daughter from out of town who is a nurse and who is very anxious  Pt markedly confused and lethargic with minimal response to questions.                         Past Medical History:        Diagnosis Date    Arthritis     Bladder cancer ([address])     Chronic renal failure, stage 3 (moderate), unspecified whether stage 3a or 3b CKD ([address])     Kidney stone     Osteoporosis     Wears dentures     WEARS UPPER ONES CURRENTLY    Wears glasses     Wears hearing aid     BILATERAL       Past Surgical History:        Procedure Laterality Date    BLADDER SURGERY N/A     r/t bladder cancer    KIDNEY STONE REMOVAL Right 8/1/2023    RIGHT PERCUTANEOUS NEPHROLITHOTOMY, INSERTION OF RIGHT PERCUTANEOUS NEPHROSTOMY TUBE, INSERTION OF GUIDEWIRE FOR PROCEDURE, POSSIBLE HOLMIUM LASER, CYSTOSCOPY performed by Faith Garrison MD at [address] [place] [address] Left 2/28/2023    EXCISION OF CHRONIC WOUND OF THE LEFT GROIN performed by Sunshine Yepez MD at [facility]       Medications Prior to Admission:    Medications Prior to Admission: sulfamethoxazole-trimethoprim (BACTRIM DS;SEPTRA DS) 800-160 MG per tablet, Take 1 tablet by mouth 2 times daily  cetirizine (ZYRTEC) 10 MG tablet, Take 1 tablet by mouth daily  Calcium Carbonate-Vitamin D (CALCIUM + D PO), Take by mouth  Ascorbic Acid (VITAMIN C) 500 MG tablet, Take 1 tablet by mouth daily  Multiple Vitamins-Minerals

## 2023-08-03 NOTE — PLAN OF CARE
Problem: Discharge Planning  Goal: Discharge to home or other facility with appropriate resources  Outcome: Progressing  Pt understands she will undergo further testing then discuss discharge plans. Problem: Safety - Adult  Goal: Free from fall injury  Outcome: Progressing  Pt understood safety education and is compliant. Problem: Skin/Tissue Integrity  Goal: Absence of new skin breakdown  Description: 1. Monitor for areas of redness and/or skin breakdown  2. Assess vascular access sites hourly  Outcome: Progressing  Pt has no new skin integrity issues on shift.

## 2023-08-03 NOTE — PLAN OF CARE
Problem: Discharge Planning  Goal: Discharge to home or other facility with appropriate resources  8/3/2023 0107 by Farhan Urias RN  Outcome: Progressing  8/3/2023 0107 by Farhan Urias RN  Outcome: Progressing     Problem: Pain  Goal: Verbalizes/displays adequate comfort level or baseline comfort level  Outcome: Progressing     Problem: Safety - Adult  Goal: Free from fall injury  Outcome: Progressing     Problem: ABCDS Injury Assessment  Goal: Absence of physical injury  Outcome: Progressing     Problem: Skin/Tissue Integrity  Goal: Absence of new skin breakdown  Description: 1. Monitor for areas of redness and/or skin breakdown  2. Assess vascular access sites hourly  3. Every 4-6 hours minimum:  Change oxygen saturation probe site  4. Every 4-6 hours:  If on nasal continuous positive airway pressure, respiratory therapy assess nares and determine need for appliance change or resting period.   Outcome: Progressing

## 2023-08-04 ENCOUNTER — APPOINTMENT (OUTPATIENT)
Dept: INTERVENTIONAL RADIOLOGY/VASCULAR | Age: 83
DRG: 659 | End: 2023-08-04
Attending: UROLOGY
Payer: MEDICARE

## 2023-08-04 LAB
ALBUMIN SERPL-MCNC: 2.5 G/DL (ref 3.4–5)
ANION GAP SERPL CALCULATED.3IONS-SCNC: 8 MMOL/L (ref 3–16)
BASOPHILS # BLD: 0 K/UL (ref 0–0.2)
BASOPHILS NFR BLD: 0.2 %
BUN SERPL-MCNC: 17 MG/DL (ref 7–20)
CALCIUM SERPL-MCNC: 7.9 MG/DL (ref 8.3–10.6)
CHLORIDE SERPL-SCNC: 111 MMOL/L (ref 99–110)
CO2 SERPL-SCNC: 21 MMOL/L (ref 21–32)
CREAT SERPL-MCNC: 1.2 MG/DL (ref 0.6–1.2)
DEPRECATED RDW RBC AUTO: 14.5 % (ref 12.4–15.4)
EOSINOPHIL # BLD: 0.2 K/UL (ref 0–0.6)
EOSINOPHIL NFR BLD: 3.2 %
GFR SERPLBLD CREATININE-BSD FMLA CKD-EPI: 44 ML/MIN/{1.73_M2}
GLUCOSE BLD-MCNC: 106 MG/DL (ref 70–99)
GLUCOSE BLD-MCNC: 107 MG/DL (ref 70–99)
GLUCOSE BLD-MCNC: 218 MG/DL (ref 70–99)
GLUCOSE BLD-MCNC: 86 MG/DL (ref 70–99)
GLUCOSE SERPL-MCNC: 108 MG/DL (ref 70–99)
HCT VFR BLD AUTO: 27.8 % (ref 36–48)
HGB BLD-MCNC: 9.2 G/DL (ref 12–16)
LYMPHOCYTES # BLD: 1.3 K/UL (ref 1–5.1)
LYMPHOCYTES NFR BLD: 23.6 %
MCH RBC QN AUTO: 29.3 PG (ref 26–34)
MCHC RBC AUTO-ENTMCNC: 33.1 G/DL (ref 31–36)
MCV RBC AUTO: 88.6 FL (ref 80–100)
MONOCYTES # BLD: 0.4 K/UL (ref 0–1.3)
MONOCYTES NFR BLD: 8.3 %
NEUTROPHILS # BLD: 3.4 K/UL (ref 1.7–7.7)
NEUTROPHILS NFR BLD: 64.7 %
PERFORMED ON: ABNORMAL
PERFORMED ON: NORMAL
PHOSPHATE SERPL-MCNC: 2.3 MG/DL (ref 2.5–4.9)
PLATELET # BLD AUTO: 169 K/UL (ref 135–450)
PMV BLD AUTO: 7.2 FL (ref 5–10.5)
POTASSIUM SERPL-SCNC: 3.8 MMOL/L (ref 3.5–5.1)
RBC # BLD AUTO: 3.14 M/UL (ref 4–5.2)
SODIUM SERPL-SCNC: 140 MMOL/L (ref 136–145)
WBC # BLD AUTO: 5.3 K/UL (ref 4–11)

## 2023-08-04 PROCEDURE — 50435 EXCHANGE NEPHROSTOMY CATH: CPT

## 2023-08-04 PROCEDURE — 99232 SBSQ HOSP IP/OBS MODERATE 35: CPT | Performed by: INTERNAL MEDICINE

## 2023-08-04 PROCEDURE — 10030 IMG GID FLU COLL DRG SFT TIS: CPT

## 2023-08-04 PROCEDURE — 80069 RENAL FUNCTION PANEL: CPT

## 2023-08-04 PROCEDURE — 36415 COLL VENOUS BLD VENIPUNCTURE: CPT

## 2023-08-04 PROCEDURE — 6360000002 HC RX W HCPCS

## 2023-08-04 PROCEDURE — 20501 NJX SINUS TRACT DIAGNOSTIC: CPT

## 2023-08-04 PROCEDURE — 99153 MOD SED SAME PHYS/QHP EA: CPT

## 2023-08-04 PROCEDURE — 99152 MOD SED SAME PHYS/QHP 5/>YRS: CPT

## 2023-08-04 PROCEDURE — C1769 GUIDE WIRE: HCPCS

## 2023-08-04 PROCEDURE — 76080 X-RAY EXAM OF FISTULA: CPT

## 2023-08-04 PROCEDURE — 1200000000 HC SEMI PRIVATE

## 2023-08-04 PROCEDURE — 6360000002 HC RX W HCPCS: Performed by: INTERNAL MEDICINE

## 2023-08-04 PROCEDURE — 2580000003 HC RX 258: Performed by: INTERNAL MEDICINE

## 2023-08-04 PROCEDURE — 6370000000 HC RX 637 (ALT 250 FOR IP): Performed by: NURSE PRACTITIONER

## 2023-08-04 PROCEDURE — 2709999900 HC NON-CHARGEABLE SUPPLY

## 2023-08-04 PROCEDURE — 6370000000 HC RX 637 (ALT 250 FOR IP): Performed by: UROLOGY

## 2023-08-04 PROCEDURE — 85025 COMPLETE CBC W/AUTO DIFF WBC: CPT

## 2023-08-04 PROCEDURE — 2580000003 HC RX 258: Performed by: UROLOGY

## 2023-08-04 PROCEDURE — C1729 CATH, DRAINAGE: HCPCS

## 2023-08-04 PROCEDURE — 0T25X0Z CHANGE DRAINAGE DEVICE IN KIDNEY, EXTERNAL APPROACH: ICD-10-PCS | Performed by: RADIOLOGY

## 2023-08-04 PROCEDURE — C1894 INTRO/SHEATH, NON-LASER: HCPCS

## 2023-08-04 PROCEDURE — C1887 CATHETER, GUIDING: HCPCS

## 2023-08-04 RX ORDER — CETIRIZINE HYDROCHLORIDE 10 MG/1
5 TABLET ORAL DAILY
Status: DISCONTINUED | OUTPATIENT
Start: 2023-08-05 | End: 2023-08-15 | Stop reason: HOSPADM

## 2023-08-04 RX ADMIN — MEROPENEM 1000 MG: 1 INJECTION, POWDER, FOR SOLUTION INTRAVENOUS at 15:06

## 2023-08-04 RX ADMIN — ACETAMINOPHEN 650 MG: 650 SOLUTION ORAL at 03:13

## 2023-08-04 RX ADMIN — SODIUM CHLORIDE, PRESERVATIVE FREE 10 ML: 5 INJECTION INTRAVENOUS at 19:51

## 2023-08-04 RX ADMIN — MEROPENEM 1000 MG: 1 INJECTION, POWDER, FOR SOLUTION INTRAVENOUS at 03:08

## 2023-08-04 RX ADMIN — ACETAMINOPHEN 650 MG: 650 SOLUTION ORAL at 20:09

## 2023-08-04 RX ADMIN — POLYETHYLENE GLYCOL 3350 17 G: 17 POWDER, FOR SOLUTION ORAL at 15:25

## 2023-08-04 ASSESSMENT — PAIN - FUNCTIONAL ASSESSMENT: PAIN_FUNCTIONAL_ASSESSMENT: ACTIVITIES ARE NOT PREVENTED

## 2023-08-04 ASSESSMENT — PAIN SCALES - GENERAL: PAINLEVEL_OUTOF10: 0

## 2023-08-04 NOTE — CONSULTS
Clinical Pharmacy Progress Note    Vancomycin has been discontinued. Will sign off pharmacy to dose Vancomycin consult. If medication is restarted and pharmacy is to manage dosing, please re-consult at that time.     Please call with questions--  Thanks--  Rosaline Washington PharmD, [address], [place] [facility]  [id number] ([facility] [place])   8/4/2023 2:54 PM

## 2023-08-04 NOTE — PLAN OF CARE
Problem: Discharge Planning  Goal: Discharge to home or other facility with appropriate resources  8/3/2023 2226 by Pia Puckett RN  Outcome: Progressing     Problem: Pain  Goal: Verbalizes/displays adequate comfort level or baseline comfort level  8/3/2023 2226 by Pia Puckett RN  Outcome: Progressing     Problem: Safety - Adult  Goal: Free from fall injury  8/3/2023 2226 by Pia Puckett RN  Outcome: Progressing     Problem: ABCDS Injury Assessment  Goal: Absence of physical injury  8/3/2023 2226 by Pia Puckett RN  Outcome: Progressing     Problem: Skin/Tissue Integrity  Goal: Absence of new skin breakdown  Description: 1. Monitor for areas of redness and/or skin breakdown  2. Assess vascular access sites hourly  3. Every 4-6 hours minimum:  Change oxygen saturation probe site  4. Every 4-6 hours:  If on nasal continuous positive airway pressure, respiratory therapy assess nares and determine need for appliance change or resting period.   8/3/2023 2226 by Pia Puckett RN  Outcome: Progressing

## 2023-08-05 ENCOUNTER — APPOINTMENT (OUTPATIENT)
Dept: CT IMAGING | Age: 83
DRG: 659 | End: 2023-08-05
Attending: UROLOGY
Payer: MEDICARE

## 2023-08-05 LAB
ALBUMIN SERPL-MCNC: 2.5 G/DL (ref 3.4–5)
ALBUMIN SERPL-MCNC: 2.7 G/DL (ref 3.4–5)
ALBUMIN SERPL-MCNC: 2.9 G/DL (ref 3.4–5)
ALBUMIN/GLOB SERPL: 1 {RATIO} (ref 1.1–2.2)
ALP SERPL-CCNC: 172 U/L (ref 40–129)
ALP SERPL-CCNC: 210 U/L (ref 40–129)
ALT SERPL-CCNC: 29 U/L (ref 10–40)
ALT SERPL-CCNC: 33 U/L (ref 10–40)
AMMONIA PLAS-SCNC: 19 UMOL/L (ref 11–51)
ANION GAP SERPL CALCULATED.3IONS-SCNC: 10 MMOL/L (ref 3–16)
ANION GAP SERPL CALCULATED.3IONS-SCNC: 13 MMOL/L (ref 3–16)
APPEARANCE STONE: NORMAL
AST SERPL-CCNC: 36 U/L (ref 15–37)
AST SERPL-CCNC: 42 U/L (ref 15–37)
BASOPHILS # BLD: 0 K/UL (ref 0–0.2)
BASOPHILS # BLD: 0 K/UL (ref 0–0.2)
BASOPHILS NFR BLD: 0.3 %
BASOPHILS NFR BLD: 0.3 %
BILIRUB DIRECT SERPL-MCNC: <0.2 MG/DL (ref 0–0.3)
BILIRUB INDIRECT SERPL-MCNC: ABNORMAL MG/DL (ref 0–1)
BILIRUB SERPL-MCNC: 0.3 MG/DL (ref 0–1)
BILIRUB SERPL-MCNC: 0.4 MG/DL (ref 0–1)
BUN SERPL-MCNC: 11 MG/DL (ref 7–20)
BUN SERPL-MCNC: 15 MG/DL (ref 7–20)
CALCIUM SERPL-MCNC: 7.9 MG/DL (ref 8.3–10.6)
CALCIUM SERPL-MCNC: 8 MG/DL (ref 8.3–10.6)
CHLORIDE SERPL-SCNC: 104 MMOL/L (ref 99–110)
CHLORIDE SERPL-SCNC: 109 MMOL/L (ref 99–110)
CO2 SERPL-SCNC: 19 MMOL/L (ref 21–32)
CO2 SERPL-SCNC: 21 MMOL/L (ref 21–32)
COMPN STONE: NORMAL
CREAT SERPL-MCNC: 1 MG/DL (ref 0.6–1.2)
CREAT SERPL-MCNC: 1.1 MG/DL (ref 0.6–1.2)
DEPRECATED RDW RBC AUTO: 14.7 % (ref 12.4–15.4)
DEPRECATED RDW RBC AUTO: 14.8 % (ref 12.4–15.4)
EOSINOPHIL # BLD: 0.1 K/UL (ref 0–0.6)
EOSINOPHIL # BLD: 0.3 K/UL (ref 0–0.6)
EOSINOPHIL NFR BLD: 2.2 %
EOSINOPHIL NFR BLD: 4.3 %
GFR SERPLBLD CREATININE-BSD FMLA CKD-EPI: 49 ML/MIN/{1.73_M2}
GFR SERPLBLD CREATININE-BSD FMLA CKD-EPI: 55 ML/MIN/{1.73_M2}
GLUCOSE BLD-MCNC: 100 MG/DL (ref 70–99)
GLUCOSE BLD-MCNC: 106 MG/DL (ref 70–99)
GLUCOSE BLD-MCNC: 107 MG/DL (ref 70–99)
GLUCOSE BLD-MCNC: 91 MG/DL (ref 70–99)
GLUCOSE SERPL-MCNC: 102 MG/DL (ref 70–99)
GLUCOSE SERPL-MCNC: 105 MG/DL (ref 70–99)
HCT VFR BLD AUTO: 29.3 % (ref 36–48)
HCT VFR BLD AUTO: 29.9 % (ref 36–48)
HGB BLD-MCNC: 10 G/DL (ref 12–16)
HGB BLD-MCNC: 9.8 G/DL (ref 12–16)
LYMPHOCYTES # BLD: 1.8 K/UL (ref 1–5.1)
LYMPHOCYTES # BLD: 1.9 K/UL (ref 1–5.1)
LYMPHOCYTES NFR BLD: 28.9 %
LYMPHOCYTES NFR BLD: 29.2 %
MAGNESIUM SERPL-MCNC: 1.7 MG/DL (ref 1.8–2.4)
MCH RBC QN AUTO: 29.5 PG (ref 26–34)
MCH RBC QN AUTO: 29.9 PG (ref 26–34)
MCHC RBC AUTO-ENTMCNC: 33.4 G/DL (ref 31–36)
MCHC RBC AUTO-ENTMCNC: 33.6 G/DL (ref 31–36)
MCV RBC AUTO: 87.9 FL (ref 80–100)
MCV RBC AUTO: 89.6 FL (ref 80–100)
MONOCYTES # BLD: 0.7 K/UL (ref 0–1.3)
MONOCYTES # BLD: 0.9 K/UL (ref 0–1.3)
MONOCYTES NFR BLD: 11.4 %
MONOCYTES NFR BLD: 13.5 %
NEUTROPHILS # BLD: 3.4 K/UL (ref 1.7–7.7)
NEUTROPHILS # BLD: 3.6 K/UL (ref 1.7–7.7)
NEUTROPHILS NFR BLD: 54.8 %
NEUTROPHILS NFR BLD: 55.1 %
PERFORMED ON: ABNORMAL
PERFORMED ON: NORMAL
PHOSPHATE SERPL-MCNC: 2 MG/DL (ref 2.5–4.9)
PLATELET # BLD AUTO: 184 K/UL (ref 135–450)
PLATELET # BLD AUTO: 212 K/UL (ref 135–450)
PMV BLD AUTO: 7.1 FL (ref 5–10.5)
PMV BLD AUTO: 7.1 FL (ref 5–10.5)
POTASSIUM SERPL-SCNC: 3.3 MMOL/L (ref 3.5–5.1)
POTASSIUM SERPL-SCNC: 4.3 MMOL/L (ref 3.5–5.1)
PROT SERPL-MCNC: 5.5 G/DL (ref 6.4–8.2)
PROT SERPL-MCNC: 5.9 G/DL (ref 6.4–8.2)
RBC # BLD AUTO: 3.34 M/UL (ref 4–5.2)
RBC # BLD AUTO: 3.34 M/UL (ref 4–5.2)
SODIUM SERPL-SCNC: 136 MMOL/L (ref 136–145)
SODIUM SERPL-SCNC: 140 MMOL/L (ref 136–145)
SPECIMEN WT: 727 MG
WBC # BLD AUTO: 6.1 K/UL (ref 4–11)
WBC # BLD AUTO: 6.5 K/UL (ref 4–11)

## 2023-08-05 PROCEDURE — 2580000003 HC RX 258: Performed by: UROLOGY

## 2023-08-05 PROCEDURE — 6360000002 HC RX W HCPCS: Performed by: INTERNAL MEDICINE

## 2023-08-05 PROCEDURE — 2580000003 HC RX 258: Performed by: INTERNAL MEDICINE

## 2023-08-05 PROCEDURE — 1200000000 HC SEMI PRIVATE

## 2023-08-05 PROCEDURE — 85025 COMPLETE CBC W/AUTO DIFF WBC: CPT

## 2023-08-05 PROCEDURE — 6370000000 HC RX 637 (ALT 250 FOR IP): Performed by: UROLOGY

## 2023-08-05 PROCEDURE — 83735 ASSAY OF MAGNESIUM: CPT

## 2023-08-05 PROCEDURE — 82140 ASSAY OF AMMONIA: CPT

## 2023-08-05 PROCEDURE — 36415 COLL VENOUS BLD VENIPUNCTURE: CPT

## 2023-08-05 PROCEDURE — 80053 COMPREHEN METABOLIC PANEL: CPT

## 2023-08-05 PROCEDURE — 6370000000 HC RX 637 (ALT 250 FOR IP): Performed by: INTERNAL MEDICINE

## 2023-08-05 PROCEDURE — 70450 CT HEAD/BRAIN W/O DYE: CPT

## 2023-08-05 RX ADMIN — SODIUM CHLORIDE: 9 INJECTION, SOLUTION INTRAVENOUS at 03:24

## 2023-08-05 RX ADMIN — SODIUM CHLORIDE: 9 INJECTION, SOLUTION INTRAVENOUS at 21:30

## 2023-08-05 RX ADMIN — SODIUM CHLORIDE, PRESERVATIVE FREE 10 ML: 5 INJECTION INTRAVENOUS at 21:14

## 2023-08-05 RX ADMIN — ONDANSETRON 4 MG: 4 TABLET, ORALLY DISINTEGRATING ORAL at 11:09

## 2023-08-05 RX ADMIN — CETIRIZINE HYDROCHLORIDE 5 MG: 10 TABLET, FILM COATED ORAL at 09:28

## 2023-08-05 RX ADMIN — MEROPENEM 1000 MG: 1 INJECTION, POWDER, FOR SOLUTION INTRAVENOUS at 03:26

## 2023-08-05 RX ADMIN — OXYCODONE HYDROCHLORIDE 5 MG: 5 TABLET ORAL at 11:09

## 2023-08-05 RX ADMIN — SODIUM CHLORIDE: 9 INJECTION, SOLUTION INTRAVENOUS at 03:29

## 2023-08-05 RX ADMIN — POLYETHYLENE GLYCOL 3350 17 G: 17 POWDER, FOR SOLUTION ORAL at 09:28

## 2023-08-05 RX ADMIN — OXYCODONE HYDROCHLORIDE 10 MG: 5 TABLET ORAL at 15:17

## 2023-08-05 ASSESSMENT — PAIN SCALES - GENERAL
PAINLEVEL_OUTOF10: 2
PAINLEVEL_OUTOF10: 7

## 2023-08-05 ASSESSMENT — PAIN DESCRIPTION - ORIENTATION: ORIENTATION: RIGHT

## 2023-08-05 ASSESSMENT — PAIN DESCRIPTION - PAIN TYPE: TYPE: SURGICAL PAIN

## 2023-08-05 ASSESSMENT — PAIN DESCRIPTION - FREQUENCY: FREQUENCY: CONTINUOUS

## 2023-08-05 ASSESSMENT — PAIN - FUNCTIONAL ASSESSMENT: PAIN_FUNCTIONAL_ASSESSMENT: ACTIVITIES ARE NOT PREVENTED

## 2023-08-05 ASSESSMENT — PAIN DESCRIPTION - ONSET: ONSET: GRADUAL

## 2023-08-05 ASSESSMENT — PAIN DESCRIPTION - DESCRIPTORS: DESCRIPTORS: ACHING;DISCOMFORT

## 2023-08-05 ASSESSMENT — PAIN DESCRIPTION - LOCATION: LOCATION: ABDOMEN;BACK

## 2023-08-05 NOTE — PLAN OF CARE
Problem: Discharge Planning  Goal: Discharge to home or other facility with appropriate resources  8/5/2023 0606 by Tamera Bauer RN  Outcome: Progressing  8/5/2023 0606 by Tamera Bauer RN  Outcome: Progressing  Flowsheets (Taken 8/5/2023 0606)  Discharge to home or other facility with appropriate resources: Identify barriers to discharge with patient and caregiver     Problem: Pain  Goal: Verbalizes/displays adequate comfort level or baseline comfort level  Outcome: Progressing  Flowsheets (Taken 8/5/2023 0606)  Verbalizes/displays adequate comfort level or baseline comfort level:   Encourage patient to monitor pain and request assistance   Assess pain using appropriate pain scale   Administer analgesics based on type and severity of pain and evaluate response   Implement non-pharmacological measures as appropriate and evaluate response   Consider cultural and social influences on pain and pain management   Notify Licensed Independent Practitioner if interventions unsuccessful or patient reports new pain     Problem: Safety - Adult  Goal: Free from fall injury  Outcome: Progressing  Flowsheets (Taken 8/5/2023 0606)  Free From Fall Injury: Instruct family/caregiver on patient safety     Problem: ABCDS Injury Assessment  Goal: Absence of physical injury  Outcome: Progressing  Flowsheets (Taken 8/5/2023 0606)  Absence of Physical Injury: Implement safety measures based on patient assessment     Problem: Skin/Tissue Integrity  Goal: Absence of new skin breakdown  Description: 1. Monitor for areas of redness and/or skin breakdown  2. Assess vascular access sites hourly  3. Every 4-6 hours minimum:  Change oxygen saturation probe site  4. Every 4-6 hours:  If on nasal continuous positive airway pressure, respiratory therapy assess nares and determine need for appliance change or resting period. Outcome: Progressing  Note: No new skin breakdown noted.

## 2023-08-06 LAB
BACTERIA BLD CULT: NORMAL
GLUCOSE BLD-MCNC: 145 MG/DL (ref 70–99)
GLUCOSE BLD-MCNC: 163 MG/DL (ref 70–99)
GLUCOSE BLD-MCNC: 228 MG/DL (ref 70–99)
GLUCOSE BLD-MCNC: 95 MG/DL (ref 70–99)
PERFORMED ON: ABNORMAL
PERFORMED ON: NORMAL

## 2023-08-06 PROCEDURE — 6370000000 HC RX 637 (ALT 250 FOR IP): Performed by: UROLOGY

## 2023-08-06 PROCEDURE — 6370000000 HC RX 637 (ALT 250 FOR IP): Performed by: INTERNAL MEDICINE

## 2023-08-06 PROCEDURE — 97116 GAIT TRAINING THERAPY: CPT

## 2023-08-06 PROCEDURE — 97530 THERAPEUTIC ACTIVITIES: CPT

## 2023-08-06 PROCEDURE — 6360000002 HC RX W HCPCS: Performed by: INTERNAL MEDICINE

## 2023-08-06 PROCEDURE — 93005 ELECTROCARDIOGRAM TRACING: CPT | Performed by: FAMILY MEDICINE

## 2023-08-06 PROCEDURE — 2580000003 HC RX 258: Performed by: INTERNAL MEDICINE

## 2023-08-06 PROCEDURE — 97162 PT EVAL MOD COMPLEX 30 MIN: CPT

## 2023-08-06 PROCEDURE — 1200000000 HC SEMI PRIVATE

## 2023-08-06 RX ORDER — LANOLIN ALCOHOL/MO/W.PET/CERES
400 CREAM (GRAM) TOPICAL 2 TIMES DAILY
Status: COMPLETED | OUTPATIENT
Start: 2023-08-06 | End: 2023-08-06

## 2023-08-06 RX ORDER — POTASSIUM CHLORIDE 20 MEQ/1
40 TABLET, EXTENDED RELEASE ORAL ONCE
Status: COMPLETED | OUTPATIENT
Start: 2023-08-06 | End: 2023-08-06

## 2023-08-06 RX ORDER — LANOLIN ALCOHOL/MO/W.PET/CERES
3 CREAM (GRAM) TOPICAL NIGHTLY PRN
Status: DISCONTINUED | OUTPATIENT
Start: 2023-08-06 | End: 2023-08-15 | Stop reason: HOSPADM

## 2023-08-06 RX ADMIN — POLYETHYLENE GLYCOL 3350 17 G: 17 POWDER, FOR SOLUTION ORAL at 09:03

## 2023-08-06 RX ADMIN — MEROPENEM 1000 MG: 1 INJECTION, POWDER, FOR SOLUTION INTRAVENOUS at 02:16

## 2023-08-06 RX ADMIN — Medication 400 MG: at 10:15

## 2023-08-06 RX ADMIN — Medication 400 MG: at 20:09

## 2023-08-06 RX ADMIN — MEROPENEM 1000 MG: 1 INJECTION, POWDER, FOR SOLUTION INTRAVENOUS at 13:51

## 2023-08-06 RX ADMIN — CETIRIZINE HYDROCHLORIDE 5 MG: 10 TABLET, FILM COATED ORAL at 08:58

## 2023-08-06 RX ADMIN — INSULIN LISPRO 1 UNITS: 100 INJECTION, SOLUTION INTRAVENOUS; SUBCUTANEOUS at 12:02

## 2023-08-06 RX ADMIN — OXYCODONE HYDROCHLORIDE 5 MG: 5 TABLET ORAL at 12:03

## 2023-08-06 RX ADMIN — POTASSIUM CHLORIDE 40 MEQ: 1500 TABLET, EXTENDED RELEASE ORAL at 10:15

## 2023-08-06 ASSESSMENT — PAIN DESCRIPTION - DESCRIPTORS: DESCRIPTORS: SORE;DULL

## 2023-08-06 ASSESSMENT — PAIN DESCRIPTION - LOCATION: LOCATION: BACK

## 2023-08-06 ASSESSMENT — PAIN SCALES - GENERAL: PAINLEVEL_OUTOF10: 4

## 2023-08-06 ASSESSMENT — PAIN DESCRIPTION - ORIENTATION: ORIENTATION: RIGHT;LOWER

## 2023-08-06 ASSESSMENT — PAIN - FUNCTIONAL ASSESSMENT: PAIN_FUNCTIONAL_ASSESSMENT: ACTIVITIES ARE NOT PREVENTED

## 2023-08-07 LAB
ANION GAP SERPL CALCULATED.3IONS-SCNC: 14 MMOL/L (ref 3–16)
BUN SERPL-MCNC: 10 MG/DL (ref 7–20)
CALCIUM SERPL-MCNC: 7.9 MG/DL (ref 8.3–10.6)
CHLORIDE SERPL-SCNC: 107 MMOL/L (ref 99–110)
CO2 SERPL-SCNC: 16 MMOL/L (ref 21–32)
CREAT SERPL-MCNC: 0.9 MG/DL (ref 0.6–1.2)
DEPRECATED RDW RBC AUTO: 15.7 % (ref 12.4–15.4)
EKG ATRIAL RATE: 93 BPM
EKG DIAGNOSIS: NORMAL
EKG P AXIS: 46 DEGREES
EKG P-R INTERVAL: 138 MS
EKG Q-T INTERVAL: 354 MS
EKG QRS DURATION: 72 MS
EKG QTC CALCULATION (BAZETT): 440 MS
EKG R AXIS: 56 DEGREES
EKG T AXIS: 39 DEGREES
EKG VENTRICULAR RATE: 93 BPM
GFR SERPLBLD CREATININE-BSD FMLA CKD-EPI: >60 ML/MIN/{1.73_M2}
GLUCOSE BLD-MCNC: 105 MG/DL (ref 70–99)
GLUCOSE BLD-MCNC: 126 MG/DL (ref 70–99)
GLUCOSE BLD-MCNC: 146 MG/DL (ref 70–99)
GLUCOSE BLD-MCNC: 164 MG/DL (ref 70–99)
GLUCOSE SERPL-MCNC: 110 MG/DL (ref 70–99)
HCT VFR BLD AUTO: 32.1 % (ref 36–48)
HGB BLD-MCNC: 10.1 G/DL (ref 12–16)
MCH RBC QN AUTO: 29.5 PG (ref 26–34)
MCHC RBC AUTO-ENTMCNC: 31.5 G/DL (ref 31–36)
MCV RBC AUTO: 93.6 FL (ref 80–100)
PERFORMED ON: ABNORMAL
PLATELET # BLD AUTO: 229 K/UL (ref 135–450)
PMV BLD AUTO: 7.7 FL (ref 5–10.5)
POTASSIUM SERPL-SCNC: 3.8 MMOL/L (ref 3.5–5.1)
RBC # BLD AUTO: 3.43 M/UL (ref 4–5.2)
SODIUM SERPL-SCNC: 137 MMOL/L (ref 136–145)
WBC # BLD AUTO: 9.1 K/UL (ref 4–11)

## 2023-08-07 PROCEDURE — 6360000002 HC RX W HCPCS: Performed by: INTERNAL MEDICINE

## 2023-08-07 PROCEDURE — 80048 BASIC METABOLIC PNL TOTAL CA: CPT

## 2023-08-07 PROCEDURE — 6370000000 HC RX 637 (ALT 250 FOR IP): Performed by: UROLOGY

## 2023-08-07 PROCEDURE — 36415 COLL VENOUS BLD VENIPUNCTURE: CPT

## 2023-08-07 PROCEDURE — 2580000003 HC RX 258: Performed by: INTERNAL MEDICINE

## 2023-08-07 PROCEDURE — 85027 COMPLETE CBC AUTOMATED: CPT

## 2023-08-07 PROCEDURE — 1200000000 HC SEMI PRIVATE

## 2023-08-07 PROCEDURE — 97535 SELF CARE MNGMENT TRAINING: CPT

## 2023-08-07 PROCEDURE — 6370000000 HC RX 637 (ALT 250 FOR IP): Performed by: INTERNAL MEDICINE

## 2023-08-07 PROCEDURE — 97166 OT EVAL MOD COMPLEX 45 MIN: CPT

## 2023-08-07 PROCEDURE — 97530 THERAPEUTIC ACTIVITIES: CPT

## 2023-08-07 PROCEDURE — 2580000003 HC RX 258: Performed by: UROLOGY

## 2023-08-07 PROCEDURE — 99232 SBSQ HOSP IP/OBS MODERATE 35: CPT | Performed by: INTERNAL MEDICINE

## 2023-08-07 PROCEDURE — 93010 ELECTROCARDIOGRAM REPORT: CPT | Performed by: INTERNAL MEDICINE

## 2023-08-07 RX ADMIN — CETIRIZINE HYDROCHLORIDE 5 MG: 10 TABLET, FILM COATED ORAL at 07:42

## 2023-08-07 RX ADMIN — OXYCODONE HYDROCHLORIDE 5 MG: 5 TABLET ORAL at 15:24

## 2023-08-07 RX ADMIN — POLYETHYLENE GLYCOL 3350 17 G: 17 POWDER, FOR SOLUTION ORAL at 07:42

## 2023-08-07 RX ADMIN — MEROPENEM 1000 MG: 1 INJECTION, POWDER, FOR SOLUTION INTRAVENOUS at 02:34

## 2023-08-07 RX ADMIN — SODIUM CHLORIDE: 9 INJECTION, SOLUTION INTRAVENOUS at 20:03

## 2023-08-07 RX ADMIN — SODIUM CHLORIDE, PRESERVATIVE FREE 10 ML: 5 INJECTION INTRAVENOUS at 07:43

## 2023-08-07 ASSESSMENT — PAIN SCALES - WONG BAKER
WONGBAKER_NUMERICALRESPONSE: 2
WONGBAKER_NUMERICALRESPONSE: 0
WONGBAKER_NUMERICALRESPONSE: 0

## 2023-08-07 ASSESSMENT — PAIN SCALES - GENERAL
PAINLEVEL_OUTOF10: 0
PAINLEVEL_OUTOF10: 4
PAINLEVEL_OUTOF10: 0
PAINLEVEL_OUTOF10: 5
PAINLEVEL_OUTOF10: 0

## 2023-08-07 ASSESSMENT — PAIN DESCRIPTION - DESCRIPTORS
DESCRIPTORS: DISCOMFORT
DESCRIPTORS: ACHING

## 2023-08-07 ASSESSMENT — PAIN DESCRIPTION - LOCATION
LOCATION: BACK
LOCATION: BACK

## 2023-08-07 ASSESSMENT — PAIN DESCRIPTION - ORIENTATION
ORIENTATION: MID
ORIENTATION: LOWER;POSTERIOR

## 2023-08-07 ASSESSMENT — PAIN - FUNCTIONAL ASSESSMENT: PAIN_FUNCTIONAL_ASSESSMENT: ACTIVITIES ARE NOT PREVENTED

## 2023-08-07 NOTE — DISCHARGE SUMMARY
Urology Discharge Summary      Patient Identification  Mina Urena is a 78 y.o. female. :  1940  Admit Date:  2023    Discharge date: 8/15/23                                  Disposition: Home with [address] [place] [address]    Discharge Diagnoses:   Patient Active Problem List   Diagnosis    Solar purpura ([address])    Post-op pain    Renal calculi    Nephrolithiasis       Surgery: R PCNL    Activity:  activity as tolerated    Condition on discharge: Stable    Follow-up: A few weeks for outpatient FU    Hospital course: Patient was admitted to [facility] for procedure as stated above. Surgery went as planned with no complications. She developed post-op sepsis and required inpatient care with consults placed to infectious disease and internal medicine. Needed a sitter for metabolic encephalopathy likely secondary to sepsis. Cultures obtained and eventually no growth noted. Had SP tube placed and urethral catheter removed during her hospital stay. Her PCN was noted to be dislodged and IR replaced tube with a nephroureteral stent. She was noted to have a few beats of unsustained V-Tach and underwent echo. Diagnosed with new onset heart failure managed by medicine. She eventually stabilized and was discharged to home with [address] [facility] in stable condition.     Teresa Officer, PA

## 2023-08-07 NOTE — DISCHARGE INSTR - COC
Continuity of Care Form    Patient Name: Kerline Bergman   :  1940  MRN:  [id number]    Admit date:  2023  Discharge date:  8/10/23    Code Status Order: Full Code   Advance Directives:   [address] Documentation       Date/Time Healthcare Directive Type of Healthcare Directive Copy in [address] Agent's Name Healthcare Agent's Phone Number    [phone] No, patient does not have an advance directive for healthcare treatment -- -- -- -- --            Admitting Physician:  Sierra Higginbotham MD  PCP: Mic Shelton MD    Discharging Nurse: [facility] Unit/Room#: 6020/9955-50  Discharging Unit Phone Number: [phone]    Emergency Contact:   Extended Emergency Contact Information  Primary Emergency Contact: Britt Waggoner  Address: [address] [place] [address] of [address] [employer] Phone: [phone]  Mobile Phone: [phone]  Relation: Child  Secondary Emergency Contact: [facility]  Address: [address] [facility], [address] [place] [address] of [address] [address] Phone: [phone]  Mobile Phone: [phone]  Relation: Child    Past Surgical History:  Past Surgical History:   Procedure Laterality Date    BLADDER SURGERY N/A     r/t bladder cancer    KIDNEY STONE REMOVAL Right 2023    RIGHT PERCUTANEOUS NEPHROLITHOTOMY, INSERTION OF RIGHT PERCUTANEOUS NEPHROSTOMY TUBE, INSERTION OF GUIDEWIRE FOR PROCEDURE, POSSIBLE HOLMIUM LASER, CYSTOSCOPY performed by Sierra Higginbotham MD at [address] [place] [address] Left 2023    EXCISION OF CHRONIC WOUND OF THE LEFT GROIN performed by Mikala Ferrer MD at [facility]       Immunization History: There is no immunization history for the selected administration types on file for this patient.     Active Problems:  Patient Active Problem List   Diagnosis Code    Solar purpura ([address]) D69.2    Post-op pain G89.18    Renal calculi

## 2023-08-08 LAB
GLUCOSE BLD-MCNC: 102 MG/DL (ref 70–99)
GLUCOSE BLD-MCNC: 107 MG/DL (ref 70–99)
GLUCOSE BLD-MCNC: 117 MG/DL (ref 70–99)
GLUCOSE BLD-MCNC: 159 MG/DL (ref 70–99)
PERFORMED ON: ABNORMAL

## 2023-08-08 PROCEDURE — 94640 AIRWAY INHALATION TREATMENT: CPT

## 2023-08-08 PROCEDURE — 1200000000 HC SEMI PRIVATE

## 2023-08-08 PROCEDURE — 2580000003 HC RX 258: Performed by: UROLOGY

## 2023-08-08 PROCEDURE — 6370000000 HC RX 637 (ALT 250 FOR IP): Performed by: INTERNAL MEDICINE

## 2023-08-08 PROCEDURE — 6370000000 HC RX 637 (ALT 250 FOR IP): Performed by: UROLOGY

## 2023-08-08 RX ADMIN — POLYETHYLENE GLYCOL 3350 17 G: 17 POWDER, FOR SOLUTION ORAL at 08:38

## 2023-08-08 RX ADMIN — OXYCODONE HYDROCHLORIDE 5 MG: 5 TABLET ORAL at 09:10

## 2023-08-08 RX ADMIN — SODIUM CHLORIDE, PRESERVATIVE FREE 10 ML: 5 INJECTION INTRAVENOUS at 08:38

## 2023-08-08 RX ADMIN — CETIRIZINE HYDROCHLORIDE 5 MG: 10 TABLET, FILM COATED ORAL at 08:38

## 2023-08-08 ASSESSMENT — PAIN DESCRIPTION - LOCATION: LOCATION: BACK

## 2023-08-08 ASSESSMENT — PAIN - FUNCTIONAL ASSESSMENT: PAIN_FUNCTIONAL_ASSESSMENT: ACTIVITIES ARE NOT PREVENTED

## 2023-08-08 ASSESSMENT — PAIN DESCRIPTION - DESCRIPTORS: DESCRIPTORS: DISCOMFORT

## 2023-08-08 ASSESSMENT — PAIN SCALES - WONG BAKER
WONGBAKER_NUMERICALRESPONSE: 0

## 2023-08-08 ASSESSMENT — PAIN SCALES - GENERAL
PAINLEVEL_OUTOF10: 0
PAINLEVEL_OUTOF10: 0
PAINLEVEL_OUTOF10: 4
PAINLEVEL_OUTOF10: 0
PAINLEVEL_OUTOF10: 0

## 2023-08-08 ASSESSMENT — PAIN DESCRIPTION - ORIENTATION: ORIENTATION: POSTERIOR;LOWER

## 2023-08-09 ENCOUNTER — APPOINTMENT (OUTPATIENT)
Dept: INTERVENTIONAL RADIOLOGY/VASCULAR | Age: 83
DRG: 659 | End: 2023-08-09
Attending: UROLOGY
Payer: MEDICARE

## 2023-08-09 LAB
ANION GAP SERPL CALCULATED.3IONS-SCNC: 9 MMOL/L (ref 3–16)
BUN SERPL-MCNC: 7 MG/DL (ref 7–20)
CALCIUM SERPL-MCNC: 8.3 MG/DL (ref 8.3–10.6)
CHLORIDE SERPL-SCNC: 109 MMOL/L (ref 99–110)
CO2 SERPL-SCNC: 24 MMOL/L (ref 21–32)
CREAT SERPL-MCNC: 0.9 MG/DL (ref 0.6–1.2)
DEPRECATED RDW RBC AUTO: 14.6 % (ref 12.4–15.4)
GFR SERPLBLD CREATININE-BSD FMLA CKD-EPI: >60 ML/MIN/{1.73_M2}
GLUCOSE BLD-MCNC: 104 MG/DL (ref 70–99)
GLUCOSE BLD-MCNC: 109 MG/DL (ref 70–99)
GLUCOSE BLD-MCNC: 186 MG/DL (ref 70–99)
GLUCOSE BLD-MCNC: 95 MG/DL (ref 70–99)
GLUCOSE SERPL-MCNC: 100 MG/DL (ref 70–99)
HCT VFR BLD AUTO: 29.3 % (ref 36–48)
HGB BLD-MCNC: 9.9 G/DL (ref 12–16)
LV EF: 58 %
LVEF MODALITY: NORMAL
MAGNESIUM SERPL-MCNC: 1.9 MG/DL (ref 1.8–2.4)
MCH RBC QN AUTO: 29.1 PG (ref 26–34)
MCHC RBC AUTO-ENTMCNC: 33.8 G/DL (ref 31–36)
MCV RBC AUTO: 86.1 FL (ref 80–100)
PERFORMED ON: ABNORMAL
PERFORMED ON: NORMAL
PLATELET # BLD AUTO: 319 K/UL (ref 135–450)
PMV BLD AUTO: 7.8 FL (ref 5–10.5)
POTASSIUM SERPL-SCNC: 3.5 MMOL/L (ref 3.5–5.1)
RBC # BLD AUTO: 3.4 M/UL (ref 4–5.2)
SODIUM SERPL-SCNC: 142 MMOL/L (ref 136–145)
WBC # BLD AUTO: 9.3 K/UL (ref 4–11)

## 2023-08-09 PROCEDURE — 6360000002 HC RX W HCPCS

## 2023-08-09 PROCEDURE — 93306 TTE W/DOPPLER COMPLETE: CPT

## 2023-08-09 PROCEDURE — 85027 COMPLETE CBC AUTOMATED: CPT

## 2023-08-09 PROCEDURE — 99152 MOD SED SAME PHYS/QHP 5/>YRS: CPT

## 2023-08-09 PROCEDURE — 2580000003 HC RX 258: Performed by: INTERNAL MEDICINE

## 2023-08-09 PROCEDURE — 83735 ASSAY OF MAGNESIUM: CPT

## 2023-08-09 PROCEDURE — 6370000000 HC RX 637 (ALT 250 FOR IP): Performed by: UROLOGY

## 2023-08-09 PROCEDURE — 0T767DZ DILATION OF RIGHT URETER WITH INTRALUMINAL DEVICE, VIA NATURAL OR ARTIFICIAL OPENING: ICD-10-PCS | Performed by: STUDENT IN AN ORGANIZED HEALTH CARE EDUCATION/TRAINING PROGRAM

## 2023-08-09 PROCEDURE — 93005 ELECTROCARDIOGRAM TRACING: CPT | Performed by: STUDENT IN AN ORGANIZED HEALTH CARE EDUCATION/TRAINING PROGRAM

## 2023-08-09 PROCEDURE — 99153 MOD SED SAME PHYS/QHP EA: CPT

## 2023-08-09 PROCEDURE — C1894 INTRO/SHEATH, NON-LASER: HCPCS

## 2023-08-09 PROCEDURE — C1769 GUIDE WIRE: HCPCS

## 2023-08-09 PROCEDURE — 80048 BASIC METABOLIC PNL TOTAL CA: CPT

## 2023-08-09 PROCEDURE — 50434 CONVERT NEPHROSTOMY CATHETER: CPT

## 2023-08-09 PROCEDURE — C2617 STENT, NON-COR, TEM W/O DEL: HCPCS

## 2023-08-09 PROCEDURE — 2709999900 HC NON-CHARGEABLE SUPPLY

## 2023-08-09 PROCEDURE — 2500000003 HC RX 250 WO HCPCS

## 2023-08-09 PROCEDURE — 36415 COLL VENOUS BLD VENIPUNCTURE: CPT

## 2023-08-09 PROCEDURE — 1200000000 HC SEMI PRIVATE

## 2023-08-09 PROCEDURE — 6370000000 HC RX 637 (ALT 250 FOR IP): Performed by: INTERNAL MEDICINE

## 2023-08-09 RX ADMIN — SODIUM CHLORIDE: 9 INJECTION, SOLUTION INTRAVENOUS at 09:41

## 2023-08-09 RX ADMIN — OXYCODONE HYDROCHLORIDE 5 MG: 5 TABLET ORAL at 09:32

## 2023-08-09 RX ADMIN — SODIUM CHLORIDE: 9 INJECTION, SOLUTION INTRAVENOUS at 22:21

## 2023-08-09 RX ADMIN — POLYETHYLENE GLYCOL 3350 17 G: 17 POWDER, FOR SOLUTION ORAL at 09:32

## 2023-08-09 RX ADMIN — CETIRIZINE HYDROCHLORIDE 5 MG: 10 TABLET, FILM COATED ORAL at 09:32

## 2023-08-09 RX ADMIN — OXYCODONE HYDROCHLORIDE 5 MG: 5 TABLET ORAL at 22:22

## 2023-08-09 ASSESSMENT — PAIN SCALES - PAIN ASSESSMENT IN ADVANCED DEMENTIA (PAINAD)
CONSOLABILITY: 0
NEGVOCALIZATION: 0
NEGVOCALIZATION: 0
BODYLANGUAGE: 0
CONSOLABILITY: 0
TOTALSCORE: 0
BREATHING: 0
FACIALEXPRESSION: 0
TOTALSCORE: 0
BODYLANGUAGE: 0
TOTALSCORE: 0
BREATHING: 0
NEGVOCALIZATION: 0
FACIALEXPRESSION: 0
BODYLANGUAGE: 0
BREATHING: 0
FACIALEXPRESSION: 0
CONSOLABILITY: 0

## 2023-08-09 ASSESSMENT — PAIN SCALES - GENERAL
PAINLEVEL_OUTOF10: 0
PAINLEVEL_OUTOF10: 4
PAINLEVEL_OUTOF10: 5
PAINLEVEL_OUTOF10: 0

## 2023-08-09 ASSESSMENT — PAIN SCALES - WONG BAKER
WONGBAKER_NUMERICALRESPONSE: 0

## 2023-08-09 ASSESSMENT — PAIN DESCRIPTION - LOCATION: LOCATION: BACK

## 2023-08-09 ASSESSMENT — PAIN DESCRIPTION - ORIENTATION: ORIENTATION: RIGHT

## 2023-08-09 ASSESSMENT — PAIN DESCRIPTION - DESCRIPTORS: DESCRIPTORS: ACHING

## 2023-08-09 NOTE — PROCEDURES
Interventional Radiology Post Procedure    Date: 8/9/2023    Physician: Carey Rodriges MD    Pre-op Diagnosis: ureteral stricture, displaced nephrostomy tube    Post-op Diagnosis: same    Variation from Planned Procedure: None       Findings: successful re-access of the right kidney with recanalization of the ureteral stricture and placement of an 8F nephroureteral stent. Patient condition: Stable    Estimated Blood Loss: 5 cc    Specimens:  none sent    Plan: Leave to gravity drainage for 24h then cap as tolerated. Routine exchange in 4-6 weeks if tube still in place.       Theo Carpenter MD  2:17 PM  8/9/2023

## 2023-08-09 NOTE — PLAN OF CARE
Problem: Pain  Goal: Verbalizes/displays adequate comfort level or baseline comfort level  Outcome: Progressing  Flowsheets (Taken 8/8/2023 2315 by Usha Segura RN)  Verbalizes/displays adequate comfort level or baseline comfort level:   Encourage patient to monitor pain and request assistance   Assess pain using appropriate pain scale   Using FLACC scale for pain control. See MAR. Problem: Safety - Adult  Goal: Free from fall injury  Outcome: Progressing   Patient at risk for falls. Patient resting quietly in bed. Side rails up x 2. Bed locked in lowest position. Bed alarm on. Bedside table and call light within reach. Patient instructed to call for assistance. Patient verbalized understanding. Will continue to monitor.

## 2023-08-10 ENCOUNTER — APPOINTMENT (OUTPATIENT)
Dept: GENERAL RADIOLOGY | Age: 83
DRG: 659 | End: 2023-08-10
Attending: UROLOGY
Payer: MEDICARE

## 2023-08-10 LAB
ANION GAP SERPL CALCULATED.3IONS-SCNC: 13 MMOL/L (ref 3–16)
BUN SERPL-MCNC: 7 MG/DL (ref 7–20)
CALCIUM SERPL-MCNC: 8.1 MG/DL (ref 8.3–10.6)
CHLORIDE SERPL-SCNC: 106 MMOL/L (ref 99–110)
CO2 SERPL-SCNC: 19 MMOL/L (ref 21–32)
CREAT SERPL-MCNC: 0.9 MG/DL (ref 0.6–1.2)
DEPRECATED RDW RBC AUTO: 14.5 % (ref 12.4–15.4)
EKG ATRIAL RATE: 97 BPM
EKG DIAGNOSIS: NORMAL
EKG P AXIS: 54 DEGREES
EKG P-R INTERVAL: 148 MS
EKG Q-T INTERVAL: 338 MS
EKG QRS DURATION: 72 MS
EKG QTC CALCULATION (BAZETT): 429 MS
EKG R AXIS: 48 DEGREES
EKG T AXIS: 12 DEGREES
EKG VENTRICULAR RATE: 97 BPM
GFR SERPLBLD CREATININE-BSD FMLA CKD-EPI: >60 ML/MIN/{1.73_M2}
GLUCOSE BLD-MCNC: 135 MG/DL (ref 70–99)
GLUCOSE BLD-MCNC: 135 MG/DL (ref 70–99)
GLUCOSE BLD-MCNC: 148 MG/DL (ref 70–99)
GLUCOSE BLD-MCNC: 160 MG/DL (ref 70–99)
GLUCOSE SERPL-MCNC: 165 MG/DL (ref 70–99)
HCT VFR BLD AUTO: 31.3 % (ref 36–48)
HGB BLD-MCNC: 10.4 G/DL (ref 12–16)
MCH RBC QN AUTO: 29.7 PG (ref 26–34)
MCHC RBC AUTO-ENTMCNC: 33.3 G/DL (ref 31–36)
MCV RBC AUTO: 89.2 FL (ref 80–100)
PERFORMED ON: ABNORMAL
PLATELET # BLD AUTO: 338 K/UL (ref 135–450)
PMV BLD AUTO: 7.2 FL (ref 5–10.5)
POTASSIUM SERPL-SCNC: 3.5 MMOL/L (ref 3.5–5.1)
RBC # BLD AUTO: 3.51 M/UL (ref 4–5.2)
SODIUM SERPL-SCNC: 138 MMOL/L (ref 136–145)
WBC # BLD AUTO: 8.6 K/UL (ref 4–11)

## 2023-08-10 PROCEDURE — 6370000000 HC RX 637 (ALT 250 FOR IP): Performed by: UROLOGY

## 2023-08-10 PROCEDURE — 71046 X-RAY EXAM CHEST 2 VIEWS: CPT

## 2023-08-10 PROCEDURE — 6370000000 HC RX 637 (ALT 250 FOR IP): Performed by: INTERNAL MEDICINE

## 2023-08-10 PROCEDURE — 6360000002 HC RX W HCPCS: Performed by: PHYSICIAN ASSISTANT

## 2023-08-10 PROCEDURE — 36415 COLL VENOUS BLD VENIPUNCTURE: CPT

## 2023-08-10 PROCEDURE — 1200000000 HC SEMI PRIVATE

## 2023-08-10 PROCEDURE — 2580000003 HC RX 258: Performed by: INTERNAL MEDICINE

## 2023-08-10 PROCEDURE — 87086 URINE CULTURE/COLONY COUNT: CPT

## 2023-08-10 PROCEDURE — 80048 BASIC METABOLIC PNL TOTAL CA: CPT

## 2023-08-10 PROCEDURE — 2580000003 HC RX 258: Performed by: PHYSICIAN ASSISTANT

## 2023-08-10 PROCEDURE — 93010 ELECTROCARDIOGRAM REPORT: CPT | Performed by: INTERNAL MEDICINE

## 2023-08-10 PROCEDURE — 6370000000 HC RX 637 (ALT 250 FOR IP): Performed by: NURSE PRACTITIONER

## 2023-08-10 PROCEDURE — 85027 COMPLETE CBC AUTOMATED: CPT

## 2023-08-10 RX ADMIN — POLYETHYLENE GLYCOL 3350 17 G: 17 POWDER, FOR SOLUTION ORAL at 07:47

## 2023-08-10 RX ADMIN — MEROPENEM 1000 MG: 1 INJECTION, POWDER, FOR SOLUTION INTRAVENOUS at 17:59

## 2023-08-10 RX ADMIN — CETIRIZINE HYDROCHLORIDE 5 MG: 10 TABLET, FILM COATED ORAL at 07:47

## 2023-08-10 RX ADMIN — ACETAMINOPHEN 650 MG: 650 SOLUTION ORAL at 11:59

## 2023-08-10 RX ADMIN — SODIUM CHLORIDE: 9 INJECTION, SOLUTION INTRAVENOUS at 10:29

## 2023-08-10 ASSESSMENT — PAIN SCALES - PAIN ASSESSMENT IN ADVANCED DEMENTIA (PAINAD)
CONSOLABILITY: 0
NEGVOCALIZATION: 1
BODYLANGUAGE: 0
FACIALEXPRESSION: 0
CONSOLABILITY: 0
TOTALSCORE: 0
TOTALSCORE: 2
BREATHING: 0
FACIALEXPRESSION: 0
BREATHING: 0
NEGVOCALIZATION: 0
BODYLANGUAGE: 1

## 2023-08-10 ASSESSMENT — PAIN - FUNCTIONAL ASSESSMENT: PAIN_FUNCTIONAL_ASSESSMENT: ACTIVITIES ARE NOT PREVENTED

## 2023-08-10 ASSESSMENT — PAIN DESCRIPTION - FREQUENCY: FREQUENCY: CONTINUOUS

## 2023-08-10 ASSESSMENT — PAIN DESCRIPTION - ORIENTATION: ORIENTATION: RIGHT

## 2023-08-10 ASSESSMENT — PAIN SCALES - GENERAL
PAINLEVEL_OUTOF10: 2
PAINLEVEL_OUTOF10: 0

## 2023-08-10 ASSESSMENT — PAIN DESCRIPTION - PAIN TYPE: TYPE: SURGICAL PAIN

## 2023-08-10 ASSESSMENT — PAIN DESCRIPTION - ONSET: ONSET: ON-GOING

## 2023-08-10 ASSESSMENT — PAIN DESCRIPTION - DESCRIPTORS: DESCRIPTORS: ACHING

## 2023-08-10 ASSESSMENT — PAIN SCALES - WONG BAKER: WONGBAKER_NUMERICALRESPONSE: 0

## 2023-08-10 ASSESSMENT — PAIN DESCRIPTION - LOCATION: LOCATION: BACK

## 2023-08-10 NOTE — DISCHARGE INSTRUCTIONS
UROLOGY: We will contact you regarding next step follow up plans. If you have any questions, please call [phone]. We recommend you follow up with you primary care physician in 2-3 weeks to discuss hospitalization. Extra Heart Failure sites:     https://[employer] cortical.io/publication/?e=600179   --- this is American Heart Association interactive Healthier Living with Heart Failure guidebook. Please click hyperlink or copy / paste link into search bar. Use your mouse to scroll through the pages. Lots of information about weight monitoring, diet tips, activity, meds, etc    HF [place] jesus  -- this is a free smart phone jesus available for iPhone and Android download. Use your phone to track sodium / fluid intake, zone tool symptom tracking, weights, medications, etc. Click on this hyperlink  HF [place] Jesus   for QR code for easy download. DASH (Dietary Approach to Stop Hypertension) diet --  SeekAlumni.no -- this diet is a flexible eating plan that promotes heart healthy eating style. Click on hyperlink or copy / paste link into search bar. Lots of low sodium recipes and tips.     CigarRepair.ca  -- more free recipes

## 2023-08-11 LAB
BACTERIA UR CULT: NORMAL
GLUCOSE BLD-MCNC: 105 MG/DL (ref 70–99)
GLUCOSE BLD-MCNC: 107 MG/DL (ref 70–99)
GLUCOSE BLD-MCNC: 139 MG/DL (ref 70–99)
GLUCOSE BLD-MCNC: 275 MG/DL (ref 70–99)
PERFORMED ON: ABNORMAL

## 2023-08-11 PROCEDURE — 2580000003 HC RX 258: Performed by: PHYSICIAN ASSISTANT

## 2023-08-11 PROCEDURE — 97116 GAIT TRAINING THERAPY: CPT

## 2023-08-11 PROCEDURE — 97110 THERAPEUTIC EXERCISES: CPT

## 2023-08-11 PROCEDURE — 97530 THERAPEUTIC ACTIVITIES: CPT

## 2023-08-11 PROCEDURE — 97535 SELF CARE MNGMENT TRAINING: CPT

## 2023-08-11 PROCEDURE — 1200000000 HC SEMI PRIVATE

## 2023-08-11 PROCEDURE — 6370000000 HC RX 637 (ALT 250 FOR IP): Performed by: INTERNAL MEDICINE

## 2023-08-11 PROCEDURE — 6370000000 HC RX 637 (ALT 250 FOR IP): Performed by: UROLOGY

## 2023-08-11 PROCEDURE — 6360000002 HC RX W HCPCS: Performed by: PHYSICIAN ASSISTANT

## 2023-08-11 RX ADMIN — POLYETHYLENE GLYCOL 3350 17 G: 17 POWDER, FOR SOLUTION ORAL at 08:29

## 2023-08-11 RX ADMIN — CETIRIZINE HYDROCHLORIDE 5 MG: 10 TABLET, FILM COATED ORAL at 08:29

## 2023-08-11 RX ADMIN — MEROPENEM 1000 MG: 1 INJECTION, POWDER, FOR SOLUTION INTRAVENOUS at 05:08

## 2023-08-11 RX ADMIN — MEROPENEM 1000 MG: 1 INJECTION, POWDER, FOR SOLUTION INTRAVENOUS at 21:07

## 2023-08-11 NOTE — PLAN OF CARE
Problem: Discharge Planning  Goal: Discharge to home or other facility with appropriate resources  8/10/2023 1324 by Nisha Correa RN  Outcome: Adequate for Discharge     Problem: Pain  Goal: Verbalizes/displays adequate comfort level or baseline comfort level  8/10/2023 1324 by Nisha Correa RN  Outcome: Adequate for Discharge     Problem: Safety - Adult  Goal: Free from fall injury  8/10/2023 1324 by Nisha Correa RN  Outcome: Adequate for Discharge

## 2023-08-12 LAB
ANION GAP SERPL CALCULATED.3IONS-SCNC: 14 MMOL/L (ref 3–16)
BUN SERPL-MCNC: 7 MG/DL (ref 7–20)
CALCIUM SERPL-MCNC: 8.2 MG/DL (ref 8.3–10.6)
CHLORIDE SERPL-SCNC: 105 MMOL/L (ref 99–110)
CO2 SERPL-SCNC: 21 MMOL/L (ref 21–32)
CREAT SERPL-MCNC: 1.1 MG/DL (ref 0.6–1.2)
GFR SERPLBLD CREATININE-BSD FMLA CKD-EPI: 49 ML/MIN/{1.73_M2}
GLUCOSE BLD-MCNC: 107 MG/DL (ref 70–99)
GLUCOSE BLD-MCNC: 114 MG/DL (ref 70–99)
GLUCOSE BLD-MCNC: 121 MG/DL (ref 70–99)
GLUCOSE BLD-MCNC: 128 MG/DL (ref 70–99)
GLUCOSE SERPL-MCNC: 129 MG/DL (ref 70–99)
MAGNESIUM SERPL-MCNC: 1.8 MG/DL (ref 1.8–2.4)
NT-PROBNP SERPL-MCNC: 1115 PG/ML (ref 0–449)
PERFORMED ON: ABNORMAL
POTASSIUM SERPL-SCNC: 3.4 MMOL/L (ref 3.5–5.1)
SODIUM SERPL-SCNC: 140 MMOL/L (ref 136–145)

## 2023-08-12 PROCEDURE — 80048 BASIC METABOLIC PNL TOTAL CA: CPT

## 2023-08-12 PROCEDURE — 94761 N-INVAS EAR/PLS OXIMETRY MLT: CPT

## 2023-08-12 PROCEDURE — 6370000000 HC RX 637 (ALT 250 FOR IP): Performed by: NURSE PRACTITIONER

## 2023-08-12 PROCEDURE — 1200000000 HC SEMI PRIVATE

## 2023-08-12 PROCEDURE — 2580000003 HC RX 258: Performed by: INTERNAL MEDICINE

## 2023-08-12 PROCEDURE — 6370000000 HC RX 637 (ALT 250 FOR IP): Performed by: UROLOGY

## 2023-08-12 PROCEDURE — 83880 ASSAY OF NATRIURETIC PEPTIDE: CPT

## 2023-08-12 PROCEDURE — 6370000000 HC RX 637 (ALT 250 FOR IP): Performed by: STUDENT IN AN ORGANIZED HEALTH CARE EDUCATION/TRAINING PROGRAM

## 2023-08-12 PROCEDURE — 2580000003 HC RX 258: Performed by: UROLOGY

## 2023-08-12 PROCEDURE — 2580000003 HC RX 258: Performed by: PHYSICIAN ASSISTANT

## 2023-08-12 PROCEDURE — 83735 ASSAY OF MAGNESIUM: CPT

## 2023-08-12 PROCEDURE — 6370000000 HC RX 637 (ALT 250 FOR IP): Performed by: INTERNAL MEDICINE

## 2023-08-12 PROCEDURE — 6360000002 HC RX W HCPCS: Performed by: PHYSICIAN ASSISTANT

## 2023-08-12 PROCEDURE — 36415 COLL VENOUS BLD VENIPUNCTURE: CPT

## 2023-08-12 RX ORDER — SPIRONOLACTONE 25 MG/1
25 TABLET ORAL DAILY
Status: DISCONTINUED | OUTPATIENT
Start: 2023-08-12 | End: 2023-08-15 | Stop reason: HOSPADM

## 2023-08-12 RX ADMIN — SODIUM CHLORIDE, PRESERVATIVE FREE 10 ML: 5 INJECTION INTRAVENOUS at 09:46

## 2023-08-12 RX ADMIN — POLYETHYLENE GLYCOL 3350 17 G: 17 POWDER, FOR SOLUTION ORAL at 09:38

## 2023-08-12 RX ADMIN — ACETAMINOPHEN 650 MG: 650 SOLUTION ORAL at 16:19

## 2023-08-12 RX ADMIN — SODIUM CHLORIDE: 9 INJECTION, SOLUTION INTRAVENOUS at 09:42

## 2023-08-12 RX ADMIN — CETIRIZINE HYDROCHLORIDE 5 MG: 10 TABLET, FILM COATED ORAL at 09:38

## 2023-08-12 RX ADMIN — SPIRONOLACTONE 25 MG: 25 TABLET, FILM COATED ORAL at 15:52

## 2023-08-12 RX ADMIN — MEROPENEM 1000 MG: 1 INJECTION, POWDER, FOR SOLUTION INTRAVENOUS at 09:45

## 2023-08-12 RX ADMIN — MEROPENEM 1000 MG: 1 INJECTION, POWDER, FOR SOLUTION INTRAVENOUS at 16:23

## 2023-08-12 ASSESSMENT — PAIN DESCRIPTION - DESCRIPTORS: DESCRIPTORS: DISCOMFORT

## 2023-08-12 ASSESSMENT — PAIN - FUNCTIONAL ASSESSMENT: PAIN_FUNCTIONAL_ASSESSMENT: ACTIVITIES ARE NOT PREVENTED

## 2023-08-12 ASSESSMENT — PAIN SCALES - GENERAL
PAINLEVEL_OUTOF10: 0
PAINLEVEL_OUTOF10: 3

## 2023-08-12 ASSESSMENT — PAIN SCALES - PAIN ASSESSMENT IN ADVANCED DEMENTIA (PAINAD)
CONSOLABILITY: 0
TOTALSCORE: 0
NEGVOCALIZATION: 0
FACIALEXPRESSION: 0
BODYLANGUAGE: 0
BREATHING: 0

## 2023-08-12 ASSESSMENT — PAIN DESCRIPTION - LOCATION: LOCATION: BACK

## 2023-08-12 ASSESSMENT — PAIN DESCRIPTION - ORIENTATION: ORIENTATION: POSTERIOR

## 2023-08-13 LAB
ANION GAP SERPL CALCULATED.3IONS-SCNC: 8 MMOL/L (ref 3–16)
BUN SERPL-MCNC: 7 MG/DL (ref 7–20)
CALCIUM SERPL-MCNC: 8.2 MG/DL (ref 8.3–10.6)
CHLORIDE SERPL-SCNC: 110 MMOL/L (ref 99–110)
CO2 SERPL-SCNC: 25 MMOL/L (ref 21–32)
CREAT SERPL-MCNC: 0.9 MG/DL (ref 0.6–1.2)
GFR SERPLBLD CREATININE-BSD FMLA CKD-EPI: >60 ML/MIN/{1.73_M2}
GLUCOSE BLD-MCNC: 101 MG/DL (ref 70–99)
GLUCOSE BLD-MCNC: 110 MG/DL (ref 70–99)
GLUCOSE BLD-MCNC: 113 MG/DL (ref 70–99)
GLUCOSE BLD-MCNC: 147 MG/DL (ref 70–99)
GLUCOSE BLD-MCNC: 191 MG/DL (ref 70–99)
GLUCOSE SERPL-MCNC: 107 MG/DL (ref 70–99)
MAGNESIUM SERPL-MCNC: 1.8 MG/DL (ref 1.8–2.4)
PERFORMED ON: ABNORMAL
POTASSIUM SERPL-SCNC: 3.2 MMOL/L (ref 3.5–5.1)
SODIUM SERPL-SCNC: 143 MMOL/L (ref 136–145)

## 2023-08-13 PROCEDURE — 6370000000 HC RX 637 (ALT 250 FOR IP): Performed by: INTERNAL MEDICINE

## 2023-08-13 PROCEDURE — 83735 ASSAY OF MAGNESIUM: CPT

## 2023-08-13 PROCEDURE — 80048 BASIC METABOLIC PNL TOTAL CA: CPT

## 2023-08-13 PROCEDURE — 6370000000 HC RX 637 (ALT 250 FOR IP): Performed by: STUDENT IN AN ORGANIZED HEALTH CARE EDUCATION/TRAINING PROGRAM

## 2023-08-13 PROCEDURE — 6360000002 HC RX W HCPCS: Performed by: PHYSICIAN ASSISTANT

## 2023-08-13 PROCEDURE — 2580000003 HC RX 258: Performed by: UROLOGY

## 2023-08-13 PROCEDURE — 2580000003 HC RX 258: Performed by: PHYSICIAN ASSISTANT

## 2023-08-13 PROCEDURE — 6360000002 HC RX W HCPCS: Performed by: STUDENT IN AN ORGANIZED HEALTH CARE EDUCATION/TRAINING PROGRAM

## 2023-08-13 PROCEDURE — 1200000000 HC SEMI PRIVATE

## 2023-08-13 PROCEDURE — 36415 COLL VENOUS BLD VENIPUNCTURE: CPT

## 2023-08-13 RX ORDER — FUROSEMIDE 10 MG/ML
20 INJECTION INTRAMUSCULAR; INTRAVENOUS ONCE
Status: COMPLETED | OUTPATIENT
Start: 2023-08-13 | End: 2023-08-13

## 2023-08-13 RX ORDER — POTASSIUM CHLORIDE 20 MEQ/1
20 TABLET, EXTENDED RELEASE ORAL 2 TIMES DAILY
Status: COMPLETED | OUTPATIENT
Start: 2023-08-13 | End: 2023-08-13

## 2023-08-13 RX ADMIN — POTASSIUM CHLORIDE 20 MEQ: 1500 TABLET, EXTENDED RELEASE ORAL at 21:33

## 2023-08-13 RX ADMIN — SODIUM CHLORIDE, PRESERVATIVE FREE 10 ML: 5 INJECTION INTRAVENOUS at 21:35

## 2023-08-13 RX ADMIN — POTASSIUM CHLORIDE 20 MEQ: 1500 TABLET, EXTENDED RELEASE ORAL at 09:43

## 2023-08-13 RX ADMIN — FUROSEMIDE 20 MG: 10 INJECTION, SOLUTION INTRAMUSCULAR; INTRAVENOUS at 09:43

## 2023-08-13 RX ADMIN — CETIRIZINE HYDROCHLORIDE 5 MG: 10 TABLET, FILM COATED ORAL at 08:20

## 2023-08-13 RX ADMIN — SODIUM CHLORIDE, PRESERVATIVE FREE 10 ML: 5 INJECTION INTRAVENOUS at 08:20

## 2023-08-13 RX ADMIN — SPIRONOLACTONE 25 MG: 25 TABLET, FILM COATED ORAL at 08:20

## 2023-08-13 RX ADMIN — MEROPENEM 1000 MG: 1 INJECTION, POWDER, FOR SOLUTION INTRAVENOUS at 05:19

## 2023-08-14 LAB
ANION GAP SERPL CALCULATED.3IONS-SCNC: 10 MMOL/L (ref 3–16)
BUN SERPL-MCNC: 6 MG/DL (ref 7–20)
CALCIUM SERPL-MCNC: 8.1 MG/DL (ref 8.3–10.6)
CHLORIDE SERPL-SCNC: 106 MMOL/L (ref 99–110)
CO2 SERPL-SCNC: 24 MMOL/L (ref 21–32)
CREAT SERPL-MCNC: 0.9 MG/DL (ref 0.6–1.2)
GFR SERPLBLD CREATININE-BSD FMLA CKD-EPI: >60 ML/MIN/{1.73_M2}
GLUCOSE BLD-MCNC: 103 MG/DL (ref 70–99)
GLUCOSE BLD-MCNC: 110 MG/DL (ref 70–99)
GLUCOSE BLD-MCNC: 134 MG/DL (ref 70–99)
GLUCOSE BLD-MCNC: 203 MG/DL (ref 70–99)
GLUCOSE SERPL-MCNC: 109 MG/DL (ref 70–99)
MAGNESIUM SERPL-MCNC: 1.8 MG/DL (ref 1.8–2.4)
PERFORMED ON: ABNORMAL
POTASSIUM SERPL-SCNC: 3.5 MMOL/L (ref 3.5–5.1)
SODIUM SERPL-SCNC: 140 MMOL/L (ref 136–145)

## 2023-08-14 PROCEDURE — 80048 BASIC METABOLIC PNL TOTAL CA: CPT

## 2023-08-14 PROCEDURE — 36415 COLL VENOUS BLD VENIPUNCTURE: CPT

## 2023-08-14 PROCEDURE — 83735 ASSAY OF MAGNESIUM: CPT

## 2023-08-14 PROCEDURE — 2580000003 HC RX 258: Performed by: UROLOGY

## 2023-08-14 PROCEDURE — 6370000000 HC RX 637 (ALT 250 FOR IP): Performed by: INTERNAL MEDICINE

## 2023-08-14 PROCEDURE — 6370000000 HC RX 637 (ALT 250 FOR IP): Performed by: STUDENT IN AN ORGANIZED HEALTH CARE EDUCATION/TRAINING PROGRAM

## 2023-08-14 PROCEDURE — 1200000000 HC SEMI PRIVATE

## 2023-08-14 RX ORDER — LISINOPRIL 10 MG/1
10 TABLET ORAL DAILY
Status: DISCONTINUED | OUTPATIENT
Start: 2023-08-14 | End: 2023-08-15 | Stop reason: HOSPADM

## 2023-08-14 RX ORDER — SPIRONOLACTONE 25 MG/1
25 TABLET ORAL DAILY
Qty: 30 TABLET | Refills: 3 | Status: SHIPPED | OUTPATIENT
Start: 2023-08-15

## 2023-08-14 RX ORDER — LISINOPRIL 10 MG/1
10 TABLET ORAL DAILY
Qty: 30 TABLET | Refills: 3 | Status: SHIPPED | OUTPATIENT
Start: 2023-08-15

## 2023-08-14 RX ADMIN — SODIUM CHLORIDE, PRESERVATIVE FREE 10 ML: 5 INJECTION INTRAVENOUS at 21:34

## 2023-08-14 RX ADMIN — SODIUM CHLORIDE, PRESERVATIVE FREE 10 ML: 5 INJECTION INTRAVENOUS at 10:14

## 2023-08-14 RX ADMIN — LISINOPRIL 10 MG: 10 TABLET ORAL at 10:25

## 2023-08-14 RX ADMIN — SPIRONOLACTONE 25 MG: 25 TABLET, FILM COATED ORAL at 10:12

## 2023-08-14 RX ADMIN — CETIRIZINE HYDROCHLORIDE 5 MG: 10 TABLET, FILM COATED ORAL at 10:12

## 2023-08-14 ASSESSMENT — PAIN SCALES - WONG BAKER
WONGBAKER_NUMERICALRESPONSE: 0
WONGBAKER_NUMERICALRESPONSE: 0

## 2023-08-14 ASSESSMENT — PAIN SCALES - PAIN ASSESSMENT IN ADVANCED DEMENTIA (PAINAD)
BODYLANGUAGE: 0
FACIALEXPRESSION: 0
NEGVOCALIZATION: 0
NEGVOCALIZATION: 0
BREATHING: 0
TOTALSCORE: 0
CONSOLABILITY: 0
CONSOLABILITY: 0
FACIALEXPRESSION: 0
TOTALSCORE: 0
BODYLANGUAGE: 0
BREATHING: 0

## 2023-08-14 ASSESSMENT — PAIN SCALES - GENERAL
PAINLEVEL_OUTOF10: 0
PAINLEVEL_OUTOF10: 0

## 2023-08-14 NOTE — PLAN OF CARE
Problem: Safety - Adult  Goal: Free from fall injury  Note: Pt at risk for falls r/t weakness  Pt placed in fall risk precautions, bed alarm In place, bed in lowest locked position, call light in reach. RN encouraged pt to stay in bed and use call light to express needs.

## 2023-08-14 NOTE — CARE COORDINATION
08/10/23 1338   IMM Letter   IMM Letter given to Patient/Family/Significant other/Guardian/POA/by: RIAZ Child   IMM Letter date given: 08/10/23   IMM Letter time given: 0134
10:52 AM  C orders placed. [facility] able to follow patient post dc. Daughter aware and agreeable.      Electronically signed by Nadine Randhawa RN, CM on 8/4/2023 at 10:53 AM.  Phone: [phone]  Fax: [phone]
12:14 PM  Met with Daughter Julien Homans at bedside to discuss dispo planning. Patient is from home with family. Daughter plans to have patient return home at dc with her brother, Hermann Szymanski, with whom the patient normally resides. She is adamant about patient returning home. Britt asked about [address]; no preference on company. Orders placed per protocol. Referral sent to Lisa Valentine LPN for [address] assistance. Referral also sent to [facility]. CM will continue to follow for dc planning and support.      Electronically signed by Nancy Fernandez RN, CM on 8/3/2023 at 12:16 PM.  Phone: [phone]  Fax: [phone]
12:53 PM  Met with daughter and patient at bedside. Daughter wanting to take patient home and forgo SNF discharge. They are wanting to trial patient at home with out any assistance at this time. JOANN HUERTA, set home care up for patient. Stay Well done.      Electronically signed by Tona Cortez RN, JOANN on 8/14/2023 at 12:56 PM.  Phone: [phone]  Fax: [phone]
1:41 PM  Spoke with Myles Chowdary; OK for [facility]. [address] good through tomorrow. [place] aware, updated. .    Pt currently off the unit for nephrostogram   Electronically signed by Brianna Live RN, CM on 8/9/2023 at 1:42 PM.  Phone: [phone]  Fax: [phone]      1:35 PM  Spoke with Myles Chowdary; she no longer wants [facility] accepting. CM will reach out to [facility] to see if a new cert is needed or if it can be witched to the new facility. LVM for [place] signed by Brianna Live RN, CM on 8/9/2023 at 1:35 PM.  Phone: [phone]  Fax: [phone]        9:14 AM  LVM with call back number for Britt    Electronically signed by Brianna Live RN, CM on 8/9/2023 at 9:14 AM.  Phone: [phone]  Fax: [phone]        8:08 AM  [address] [place] [address] obtains: Plan ID [id number]  [facility] ID [id number]. Approved through 8/10    Awaiting on final decision from family for SNF placement.      Electronically signed by Brianna Live RN, CM on 8/9/2023 at 8:09 AM.  Phone: [phone]  Fax: [phone]
2:01 PM  [facility] accepting patient. Spoke with Pablokennedy Reyna who wants to verify with brother Kirsty Gan that [facility] will be a good match. [facility] unable to take patient tonight, but will have a bed tomorrow. Requested transport after 1000. Electronically signed by Rodney Cook RN, CM on 8/8/2023 at 2:02 PM.  Phone: [phone]  Fax: [phone]        12:18 PM  LVM for daughter Pablo Reyna to discuss dispo planning. [facility] is accepting at this time. Patient will need precert. Awaiting call back from [facility] to ensure preference of facility at [place]. Sitter discontinued at 1300 yesterday afternoon. Patient will need to be sitter free for 24 hrs prior to SNF admission.      Electronically signed by Rodney Cook RN, CM on 8/8/2023 at 12:19 PM.  Phone: [phone]  Fax: [phone]
2:32 PM  [facility] called with questions about patient. Patient must be sitter free for 24 hrs. Patient has x2 drainage tubes. CM following     Electronically signed by Joel Abarca RN, JOANN on 8/7/2023 at 2:32 PM.  Phone: [phone]  Fax: [phone]      12:39 PM  Met with RN who stated family is having concerns about patient returning home. Spoke with Perry Blair and she is on the fence about sending patient to SNF. CM and daughter discusses pros and cons. Britt ultimately decided on SNF admission. Referrals sent after lengthy discussion about nearby facilities. Amador Johnnie will attempt to reach her brother Vasquez Dunbar, with whom pt lives. CM will follow up on referrals.      Electronically signed by Joel Abarca RN, JOANN on 8/7/2023 at 12:45 PM.  Phone: [phone]  Fax: [phone]
Bedside made this RN aware that patient will no longer be discharged today due to medical complications. Transport cancelled. Tana called and made aware. Bedside RN to call family to update. CM team to continue to follow.     Rosie Pickering RN Case Manager
CTN contacted [address] [place] [address] [phone]. They have accepted this patient and will pull referral from [facility].  They will contact patient and make arrangements for General acute hospital[facility] by 8/9  Electronically signed by Janith Spatz, LPN on 2/7/7609 at 17:25 AM
[phone]  Mobile Phone: [phone]  Relation: Child    Care Transition Patient: No    IMM Status:      Norma Driver RN,   [facility]  Case Management Department  Ph: [phone]  Fax: [phone]
Nursing Facility (SNF): [facility] Phone: [phone] Fax: [phone]    LOC at discharge: Skilled  [address] Completed: Yes    Notification completed in HENS/PAS?:  Yes : CM has completed HENS online through secure website for SNF admission at [facility]. Document ID #: [id number]    IMM Completed:   Yes, Case management has presented and reviewed IMM letter #2 to the patient and/or family/ POA. Patient and/or family/POA verbalized understanding of their medicare rights and appeal process if needed. Patient and/or family/POA has signed and placed today's date (8/10/2023) and time ([phone]) on letter #2 on the the appropriate lines. Patient and/or family/POA, provided copy of signed letter and they are aware that this original copy of IMM letter #2 is available prior to discharge from the paper chart on the unit. Electronic documentation has been entered into epic for IMM letter #2 and original paper copy has been added to the paper chart at the nurses station.        Transportation:  Transportation PLAN for discharge: EMS transportation   Mode of Transport: Ambulance stretcher - S  Reason for medical transport: Bed confined: Meets the following criteria 1) unable to get out of bed without assistance or ambulate, 2) unable to safely sit up in a wheelchair, 3) unable to maintain erect seating position in a chair for time needed for transport  Name of Transport Company: [employer]: [phone]  Time of Transport: [address] form completed: Yes    Home Care:  [facility] ordered at discharge: Not [address]: Not Applicable  Orders faxed: No    Durable Medical Equipment:  DME Provider: na  Equipment obtained during hospitalization: na    Home Oxygen and Respiratory Equipment:  Oxygen needed at discharge?: Not [address]: Not Applicable  Portable tank available for discharge?: Not Indicated    Dialysis:  Dialysis patient: No    Dialysis Center:  Not [facility]
dialogue that supports the patient's individualized plan of care/goals and shares the quality data associated with the providers.  Yes    Care Transitions patient: No    Roxi Lee RN  [facility]  Case Management Department  Ph: [phone]  Fax: [phone]

## 2023-08-15 ENCOUNTER — APPOINTMENT (OUTPATIENT)
Dept: INTERVENTIONAL RADIOLOGY/VASCULAR | Age: 83
DRG: 659 | End: 2023-08-15
Attending: UROLOGY
Payer: MEDICARE

## 2023-08-15 VITALS
HEIGHT: 60 IN | SYSTOLIC BLOOD PRESSURE: 103 MMHG | BODY MASS INDEX: 20.78 KG/M2 | RESPIRATION RATE: 18 BRPM | DIASTOLIC BLOOD PRESSURE: 56 MMHG | WEIGHT: 105.82 LBS | TEMPERATURE: 98.6 F | OXYGEN SATURATION: 98 % | HEART RATE: 89 BPM

## 2023-08-15 LAB
GLUCOSE BLD-MCNC: 107 MG/DL (ref 70–99)
GLUCOSE BLD-MCNC: 118 MG/DL (ref 70–99)
PERFORMED ON: ABNORMAL
PERFORMED ON: ABNORMAL

## 2023-08-15 PROCEDURE — 6370000000 HC RX 637 (ALT 250 FOR IP): Performed by: STUDENT IN AN ORGANIZED HEALTH CARE EDUCATION/TRAINING PROGRAM

## 2023-08-15 PROCEDURE — C2617 STENT, NON-COR, TEM W/O DEL: HCPCS

## 2023-08-15 PROCEDURE — 6370000000 HC RX 637 (ALT 250 FOR IP): Performed by: INTERNAL MEDICINE

## 2023-08-15 PROCEDURE — 50695 PLMT URETERAL STENT PRQ: CPT

## 2023-08-15 PROCEDURE — 6360000004 HC RX CONTRAST MEDICATION: Performed by: RADIOLOGY

## 2023-08-15 PROCEDURE — 6360000002 HC RX W HCPCS

## 2023-08-15 PROCEDURE — 2580000003 HC RX 258

## 2023-08-15 PROCEDURE — 0TP97DZ REMOVAL OF INTRALUMINAL DEVICE FROM URETER, VIA NATURAL OR ARTIFICIAL OPENING: ICD-10-PCS | Performed by: STUDENT IN AN ORGANIZED HEALTH CARE EDUCATION/TRAINING PROGRAM

## 2023-08-15 PROCEDURE — 2580000003 HC RX 258: Performed by: UROLOGY

## 2023-08-15 PROCEDURE — C1769 GUIDE WIRE: HCPCS

## 2023-08-15 PROCEDURE — 0T767DZ DILATION OF RIGHT URETER WITH INTRALUMINAL DEVICE, VIA NATURAL OR ARTIFICIAL OPENING: ICD-10-PCS | Performed by: STUDENT IN AN ORGANIZED HEALTH CARE EDUCATION/TRAINING PROGRAM

## 2023-08-15 PROCEDURE — 99152 MOD SED SAME PHYS/QHP 5/>YRS: CPT

## 2023-08-15 RX ADMIN — CETIRIZINE HYDROCHLORIDE 5 MG: 10 TABLET, FILM COATED ORAL at 10:31

## 2023-08-15 RX ADMIN — SODIUM CHLORIDE, PRESERVATIVE FREE 10 ML: 5 INJECTION INTRAVENOUS at 10:32

## 2023-08-15 RX ADMIN — LISINOPRIL 10 MG: 10 TABLET ORAL at 10:31

## 2023-08-15 RX ADMIN — SPIRONOLACTONE 25 MG: 25 TABLET, FILM COATED ORAL at 10:31

## 2023-08-15 RX ADMIN — IOHEXOL 50 ML: 350 INJECTION, SOLUTION INTRAVENOUS at 08:17

## 2023-08-16 ENCOUNTER — APPOINTMENT (OUTPATIENT)
Dept: GENERAL RADIOLOGY | Age: 83
End: 2023-08-16
Payer: MEDICARE

## 2023-08-16 ENCOUNTER — HOSPITAL ENCOUNTER (EMERGENCY)
Age: 83
Discharge: HOME | End: 2023-08-16
Attending: EMERGENCY MEDICINE
Payer: MEDICARE

## 2023-08-16 VITALS
SYSTOLIC BLOOD PRESSURE: 152 MMHG | BODY MASS INDEX: 20.81 KG/M2 | OXYGEN SATURATION: 93 % | RESPIRATION RATE: 19 BRPM | TEMPERATURE: 97.6 F | HEIGHT: 60 IN | HEART RATE: 79 BPM | WEIGHT: 106 LBS | DIASTOLIC BLOOD PRESSURE: 72 MMHG

## 2023-08-16 DIAGNOSIS — Z43.5 ENCOUNTER FOR CARE OR REPLACEMENT OF SUPRAPUBIC TUBE (HCC): Primary | ICD-10-CM

## 2023-08-16 LAB
ANION GAP SERPL CALCULATED.3IONS-SCNC: 12 MMOL/L (ref 3–16)
BASOPHILS # BLD: 0.1 K/UL (ref 0–0.2)
BASOPHILS NFR BLD: 0.8 %
BUN SERPL-MCNC: 10 MG/DL (ref 7–20)
CALCIUM SERPL-MCNC: 8.5 MG/DL (ref 8.3–10.6)
CHLORIDE SERPL-SCNC: 106 MMOL/L (ref 99–110)
CO2 SERPL-SCNC: 22 MMOL/L (ref 21–32)
CREAT SERPL-MCNC: 1 MG/DL (ref 0.6–1.2)
DEPRECATED RDW RBC AUTO: 14.8 % (ref 12.4–15.4)
EOSINOPHIL # BLD: 0.2 K/UL (ref 0–0.6)
EOSINOPHIL NFR BLD: 3.3 %
GFR SERPLBLD CREATININE-BSD FMLA CKD-EPI: 55 ML/MIN/{1.73_M2}
GLUCOSE SERPL-MCNC: 119 MG/DL (ref 70–99)
HCT VFR BLD AUTO: 27.3 % (ref 36–48)
HGB BLD-MCNC: 9.2 G/DL (ref 12–16)
LYMPHOCYTES # BLD: 3.2 K/UL (ref 1–5.1)
LYMPHOCYTES NFR BLD: 41.4 %
MCH RBC QN AUTO: 29.2 PG (ref 26–34)
MCHC RBC AUTO-ENTMCNC: 33.6 G/DL (ref 31–36)
MCV RBC AUTO: 87 FL (ref 80–100)
MONOCYTES # BLD: 0.6 K/UL (ref 0–1.3)
MONOCYTES NFR BLD: 8.3 %
NEUTROPHILS # BLD: 3.5 K/UL (ref 1.7–7.7)
NEUTROPHILS NFR BLD: 46.2 %
PLATELET # BLD AUTO: 381 K/UL (ref 135–450)
PMV BLD AUTO: 6.8 FL (ref 5–10.5)
POTASSIUM SERPL-SCNC: 3.8 MMOL/L (ref 3.5–5.1)
RBC # BLD AUTO: 3.14 M/UL (ref 4–5.2)
SODIUM SERPL-SCNC: 140 MMOL/L (ref 136–145)
WBC # BLD AUTO: 7.6 K/UL (ref 4–11)

## 2023-08-16 PROCEDURE — 99284 EMERGENCY DEPT VISIT MOD MDM: CPT

## 2023-08-16 PROCEDURE — 74018 RADEX ABDOMEN 1 VIEW: CPT

## 2023-08-16 PROCEDURE — 80048 BASIC METABOLIC PNL TOTAL CA: CPT

## 2023-08-16 PROCEDURE — 85025 COMPLETE CBC W/AUTO DIFF WBC: CPT

## 2023-08-16 ASSESSMENT — PAIN - FUNCTIONAL ASSESSMENT: PAIN_FUNCTIONAL_ASSESSMENT: NONE - DENIES PAIN

## 2023-08-16 NOTE — DISCHARGE INSTRUCTIONS
You were seen in the emergency department for reevaluation of your suprapubic tube and nephrostomy tube. After flushing the suprapubic tube, it appears to be functioning well at this time. You may continue to flush it as needed if there are signs it is not draining well. Some leakage around the tube at the skin is expected. Please leave the nephrostomy tube that goes to the kidney on the right side draining to a bag tonight. Please cap or clamp it around 7 or 8 AM tomorrow. Watch closely for worsening confusion, pain in the abdomen or flank, fever, nausea/vomiting, inability to eat/drink, or any other concerns. If symptoms are worsening, call Dr. Rodolfo Haney office and they should be able to get you into clinic for an emergent visit. You may always return to the emergency room as well for any concerns.

## 2023-08-16 NOTE — ED NOTES
Pt recently discharged from hospital after having a drain placed in her fistula. Per daughter patient was to clamp drain and see if urine would only come from her superpubic catheter. Per daughter patient is having drainage around her catheter. Pt denies pain.       Karla Bae RN  08/16/23 8382

## 2023-08-21 ENCOUNTER — HOSPITAL ENCOUNTER (OUTPATIENT)
Dept: INTERVENTIONAL RADIOLOGY/VASCULAR | Age: 83
Discharge: HOME | End: 2023-08-21
Payer: MEDICARE

## 2023-08-21 VITALS — WEIGHT: 90 LBS | TEMPERATURE: 97.5 F | BODY MASS INDEX: 18.14 KG/M2 | HEIGHT: 59 IN

## 2023-08-21 DIAGNOSIS — N20.0 CALCULUS OF KIDNEY: ICD-10-CM

## 2023-08-21 PROCEDURE — 6360000002 HC RX W HCPCS

## 2023-08-21 PROCEDURE — 85610 PROTHROMBIN TIME: CPT

## 2023-08-21 PROCEDURE — 2500000003 HC RX 250 WO HCPCS

## 2023-08-21 PROCEDURE — 6360000004 HC RX CONTRAST MEDICATION: Performed by: STUDENT IN AN ORGANIZED HEALTH CARE EDUCATION/TRAINING PROGRAM

## 2023-08-21 PROCEDURE — 50389 REMOVE RENAL TUBE W/FLUORO: CPT

## 2023-08-21 RX ADMIN — IOHEXOL 100 ML: 350 INJECTION, SOLUTION INTRAVENOUS at 11:08

## 2023-08-21 NOTE — BRIEF OP NOTE
Interventional Radiology Post Procedure    Date: 8/21/2023    Physician: Lady Lori SAEED    Pre-op Diagnosis: right PCN    Post-op Diagnosis: removal of right PCN with free flow of contrast down the ureter    Variation from Planned Procedure: None       Findings: right PCN removal without complication    Patient condition: Stable    Estimated Blood Loss: Minimal    Specimens:  None      Signed,  Lady Lori MD  11:28 AM  8/21/2023

## 2023-08-21 NOTE — DISCHARGE INSTRUCTIONS
Page 1 of 1  For informational purposes only. Not to replace the advice of your health care provider. Copyright   2010 [facility]. All rights reserved. Jangl SMS 247180 - REV 02/16.  After Your Breast Biopsy   Bleeding or bruising  Slight bruising is normal. If you bleed through the bandage, put direct pressure on the breast for 10 minutes.   If the breast begins to swell, or you have a lot of bleeding after 10 minutes of pressure, call the doctor who ordered your exam. Or, go to the emergency room.   Bandages  Keep your bandage in place until tomorrow morning. Do not get it wet.   If you have small pieces of tape on the skin, leave them in place. They will fall off on their own, or you can remove them after 5 days.   Activity  You may shower the morning after the exam. No heavy activity (lifting, vacuuming) on the day of your exam. You may go back to normal activity the next day, unless you had a lot of bleeding or pain.  Discomfort  You may take Tylenol (acetaminophen) today for pain. Tomorrow, you may take an anti-inflammatory medicine (aspirin, ibuprofen, Motrin, Aleve, Advil), unless your doctor tells you not to.  Wear your bra overnight to support the breast. You may also use an ice pack: Place it over the area for 15-20 minutes several times a day.  Infection  Infection is rare. Symptoms include fever, redness, increasing pain and fluid draining from the biopsy site. If you have any of these symptoms, please call the doctor who ordered your exam.  Results  Results may take up to 5 business days. A nurse or doctor from the Breast Center will call with your results. We will also send the results to the doctor who ordered your biopsy.  If you have not heard your results in 5 days, please call the Breast Center.   Other instructions  ______________________________________________________________________________________________________________________________  Call your doctor if:    You have  [facility] [employer] [employer]  Cardiovascular Special Procedures  Percutaneous Nephrostomy Removal  Patient Discharge Instructions                                                      Avoid strenuous activities like sports, lawn mowing, or lifting more than 10lbs for the next 5-7 days. Check dressing often to monitor for drainage. You may shower unless otherwise instructed. Do not soak in a bathtub or submerge in water until site is completely healed    Gently clean around the insertion site with liquid soap (like Dial) and warm water,rinse thoroughly,and pat dry with a clean towel. Check the insertion site and skin area around it for any signs of increased redness, temperature, swelling, pain tenderness, bleeding, foul drainage,or urine leakage. Do not apply ointment or alchol to the site, just keep it clean and dry. Apply clean dressing daily for the next 5-7 days. Drink plenty of liquids,especially water. About six 8 ounces of liquid a day. If you had a kidney stone treated, this will help flush the residue from your system. Notify your doctor:    Redness to site    Any foul smelling drainage    Any increase or change in your pain, especially if you develop severe  pain in your side. If your temperature is 100.4 or higher. You may contact [address] [place] [address] for any questions or problems that may occur at [phone] during the hours of 9am-4pm Monday-Friday, or the hospital  after hours at [phone], to have the interventional radiologist on call paged. [facility] [employer] [employer]  Cardiovascular Special Procedures  General Discharge Instructions      ____ You may be drowsy or lightheaded after receiving sedation.  DO NOT operate a vehicle (automobile, bicycle, motorcycle, machinery, or bleeding that lasts more than 10 minutes.    You have pain that cannot be controlled.   You have signs of infection (fever, redness, drainage or other signs).   You have not received your results within 5 days.    Please call the Breast Center nurse navigator at [phone] if you have questions or concerns about your biopsy.

## 2023-08-23 LAB — INR BLD: 1.4 (ref 0.84–1.16)

## 2023-11-07 ENCOUNTER — TRANSCRIBE ORDERS (OUTPATIENT)
Dept: ADMINISTRATIVE | Age: 83
End: 2023-11-07

## 2023-11-07 DIAGNOSIS — C67.2 MALIGNANT NEOPLASM OF LATERAL WALL OF URINARY BLADDER (HCC): ICD-10-CM

## 2023-11-07 DIAGNOSIS — Z87.440 HISTORY OF URINARY TRACT INFECTION: Primary | ICD-10-CM

## 2023-11-07 DIAGNOSIS — N32.1 VESICO-INTESTINAL FISTULA: ICD-10-CM

## 2023-11-13 ENCOUNTER — HOSPITAL ENCOUNTER (INPATIENT)
Age: 83
LOS: 7 days | Discharge: HOME HEALTH | DRG: 698 | End: 2023-11-20
Attending: EMERGENCY MEDICINE | Admitting: INTERNAL MEDICINE
Payer: MEDICARE

## 2023-11-13 ENCOUNTER — APPOINTMENT (OUTPATIENT)
Dept: CT IMAGING | Age: 83
DRG: 698 | End: 2023-11-13
Payer: MEDICARE

## 2023-11-13 DIAGNOSIS — T83.510S URINARY TRACT INFECTION ASSOCIATED WITH CYSTOSTOMY CATHETER, SEQUELA: ICD-10-CM

## 2023-11-13 DIAGNOSIS — N39.0 URINARY TRACT INFECTION ASSOCIATED WITH CYSTOSTOMY CATHETER, SEQUELA: ICD-10-CM

## 2023-11-13 DIAGNOSIS — N13.30 HYDRONEPHROSIS, UNSPECIFIED HYDRONEPHROSIS TYPE: Primary | ICD-10-CM

## 2023-11-13 LAB
ANION GAP SERPL CALCULATED.3IONS-SCNC: 14 MMOL/L (ref 3–16)
BACTERIA URNS QL MICRO: ABNORMAL /HPF
BASOPHILS # BLD: 0.1 K/UL (ref 0–0.2)
BASOPHILS NFR BLD: 0.7 %
BILIRUB UR QL STRIP.AUTO: NEGATIVE
BUN SERPL-MCNC: 19 MG/DL (ref 7–20)
CALCIUM SERPL-MCNC: 9.4 MG/DL (ref 8.3–10.6)
CHLORIDE SERPL-SCNC: 101 MMOL/L (ref 99–110)
CLARITY UR: CLEAR
CO2 SERPL-SCNC: 23 MMOL/L (ref 21–32)
COLOR UR: YELLOW
CREAT SERPL-MCNC: 1.5 MG/DL (ref 0.6–1.2)
DEPRECATED RDW RBC AUTO: 14.9 % (ref 12.4–15.4)
EOSINOPHIL # BLD: 0.4 K/UL (ref 0–0.6)
EOSINOPHIL NFR BLD: 4 %
GFR SERPLBLD CREATININE-BSD FMLA CKD-EPI: 34 ML/MIN/{1.73_M2}
GLUCOSE SERPL-MCNC: 104 MG/DL (ref 70–99)
GLUCOSE UR STRIP.AUTO-MCNC: NEGATIVE MG/DL
HCT VFR BLD AUTO: 32.4 % (ref 36–48)
HGB BLD-MCNC: 10.7 G/DL (ref 12–16)
HGB UR QL STRIP.AUTO: ABNORMAL
KETONES UR STRIP.AUTO-MCNC: NEGATIVE MG/DL
LEUKOCYTE ESTERASE UR QL STRIP.AUTO: ABNORMAL
LYMPHOCYTES # BLD: 2.8 K/UL (ref 1–5.1)
LYMPHOCYTES NFR BLD: 30.6 %
MCH RBC QN AUTO: 28.6 PG (ref 26–34)
MCHC RBC AUTO-ENTMCNC: 33 G/DL (ref 31–36)
MCV RBC AUTO: 86.7 FL (ref 80–100)
MONOCYTES # BLD: 0.6 K/UL (ref 0–1.3)
MONOCYTES NFR BLD: 6.9 %
MUCOUS THREADS #/AREA URNS LPF: ABNORMAL /LPF
NEUTROPHILS # BLD: 5.2 K/UL (ref 1.7–7.7)
NEUTROPHILS NFR BLD: 57.8 %
NITRITE UR QL STRIP.AUTO: POSITIVE
PH UR STRIP.AUTO: 6.5 [PH] (ref 5–8)
PLATELET # BLD AUTO: 269 K/UL (ref 135–450)
PMV BLD AUTO: 6.8 FL (ref 5–10.5)
POTASSIUM SERPL-SCNC: 4.2 MMOL/L (ref 3.5–5.1)
PROT UR STRIP.AUTO-MCNC: ABNORMAL MG/DL
RBC # BLD AUTO: 3.73 M/UL (ref 4–5.2)
RBC #/AREA URNS HPF: ABNORMAL /HPF (ref 0–4)
SODIUM SERPL-SCNC: 138 MMOL/L (ref 136–145)
SP GR UR STRIP.AUTO: 1.02 (ref 1–1.03)
UA DIPSTICK W REFLEX MICRO PNL UR: YES
URN SPEC COLLECT METH UR: ABNORMAL
UROBILINOGEN UR STRIP-ACNC: 0.2 E.U./DL
WBC # BLD AUTO: 9.1 K/UL (ref 4–11)
WBC #/AREA URNS HPF: ABNORMAL /HPF (ref 0–5)

## 2023-11-13 PROCEDURE — 96365 THER/PROPH/DIAG IV INF INIT: CPT

## 2023-11-13 PROCEDURE — 81001 URINALYSIS AUTO W/SCOPE: CPT

## 2023-11-13 PROCEDURE — 36415 COLL VENOUS BLD VENIPUNCTURE: CPT

## 2023-11-13 PROCEDURE — 80048 BASIC METABOLIC PNL TOTAL CA: CPT

## 2023-11-13 PROCEDURE — 74177 CT ABD & PELVIS W/CONTRAST: CPT

## 2023-11-13 PROCEDURE — 2580000003 HC RX 258: Performed by: INTERNAL MEDICINE

## 2023-11-13 PROCEDURE — 1200000000 HC SEMI PRIVATE

## 2023-11-13 PROCEDURE — 85025 COMPLETE CBC W/AUTO DIFF WBC: CPT

## 2023-11-13 PROCEDURE — 2580000003 HC RX 258

## 2023-11-13 PROCEDURE — 6360000002 HC RX W HCPCS

## 2023-11-13 PROCEDURE — 6360000002 HC RX W HCPCS: Performed by: INTERNAL MEDICINE

## 2023-11-13 PROCEDURE — 6360000004 HC RX CONTRAST MEDICATION

## 2023-11-13 PROCEDURE — 99285 EMERGENCY DEPT VISIT HI MDM: CPT

## 2023-11-13 PROCEDURE — 96366 THER/PROPH/DIAG IV INF ADDON: CPT

## 2023-11-13 RX ORDER — POTASSIUM CHLORIDE 20 MEQ/1
40 TABLET, EXTENDED RELEASE ORAL PRN
Status: DISCONTINUED | OUTPATIENT
Start: 2023-11-13 | End: 2023-11-13

## 2023-11-13 RX ORDER — ONDANSETRON 2 MG/ML
4 INJECTION INTRAMUSCULAR; INTRAVENOUS EVERY 6 HOURS PRN
Status: DISCONTINUED | OUTPATIENT
Start: 2023-11-13 | End: 2023-11-20 | Stop reason: HOSPADM

## 2023-11-13 RX ORDER — SODIUM CHLORIDE 0.9 % (FLUSH) 0.9 %
5-40 SYRINGE (ML) INJECTION PRN
Status: DISCONTINUED | OUTPATIENT
Start: 2023-11-13 | End: 2023-11-20 | Stop reason: HOSPADM

## 2023-11-13 RX ORDER — SODIUM CHLORIDE 0.9 % (FLUSH) 0.9 %
5-40 SYRINGE (ML) INJECTION EVERY 12 HOURS SCHEDULED
Status: DISCONTINUED | OUTPATIENT
Start: 2023-11-13 | End: 2023-11-20 | Stop reason: HOSPADM

## 2023-11-13 RX ORDER — SPIRONOLACTONE 25 MG/1
25 TABLET ORAL DAILY
Status: DISCONTINUED | OUTPATIENT
Start: 2023-11-13 | End: 2023-11-20 | Stop reason: HOSPADM

## 2023-11-13 RX ORDER — ACETAMINOPHEN 325 MG/1
650 TABLET ORAL EVERY 6 HOURS PRN
Status: DISCONTINUED | OUTPATIENT
Start: 2023-11-13 | End: 2023-11-20 | Stop reason: HOSPADM

## 2023-11-13 RX ORDER — SODIUM CHLORIDE 9 MG/ML
INJECTION, SOLUTION INTRAVENOUS PRN
Status: DISCONTINUED | OUTPATIENT
Start: 2023-11-13 | End: 2023-11-20 | Stop reason: HOSPADM

## 2023-11-13 RX ORDER — ONDANSETRON 4 MG/1
4 TABLET, ORALLY DISINTEGRATING ORAL EVERY 8 HOURS PRN
Status: DISCONTINUED | OUTPATIENT
Start: 2023-11-13 | End: 2023-11-20 | Stop reason: HOSPADM

## 2023-11-13 RX ORDER — POLYETHYLENE GLYCOL 3350 17 G/17G
17 POWDER, FOR SOLUTION ORAL DAILY PRN
Status: DISCONTINUED | OUTPATIENT
Start: 2023-11-13 | End: 2023-11-20 | Stop reason: HOSPADM

## 2023-11-13 RX ORDER — ACETAMINOPHEN 650 MG/1
650 SUPPOSITORY RECTAL EVERY 6 HOURS PRN
Status: DISCONTINUED | OUTPATIENT
Start: 2023-11-13 | End: 2023-11-20 | Stop reason: HOSPADM

## 2023-11-13 RX ORDER — POTASSIUM CHLORIDE 7.45 MG/ML
10 INJECTION INTRAVENOUS PRN
Status: DISCONTINUED | OUTPATIENT
Start: 2023-11-13 | End: 2023-11-13

## 2023-11-13 RX ORDER — HEPARIN SODIUM 5000 [USP'U]/ML
5000 INJECTION, SOLUTION INTRAVENOUS; SUBCUTANEOUS 2 TIMES DAILY
Status: DISCONTINUED | OUTPATIENT
Start: 2023-11-13 | End: 2023-11-20 | Stop reason: HOSPADM

## 2023-11-13 RX ADMIN — IOHEXOL 50 ML: 350 INJECTION, SOLUTION INTRAVENOUS at 12:34

## 2023-11-13 RX ADMIN — HEPARIN SODIUM 5000 UNITS: 5000 INJECTION INTRAVENOUS; SUBCUTANEOUS at 22:58

## 2023-11-13 RX ADMIN — CEFTRIAXONE 1000 MG: 1 INJECTION, POWDER, FOR SOLUTION INTRAMUSCULAR; INTRAVENOUS at 16:16

## 2023-11-13 RX ADMIN — SODIUM CHLORIDE, PRESERVATIVE FREE 10 ML: 5 INJECTION INTRAVENOUS at 22:58

## 2023-11-13 RX ADMIN — IOPAMIDOL 75 ML: 755 INJECTION, SOLUTION INTRAVENOUS at 12:34

## 2023-11-13 ASSESSMENT — PAIN SCALES - GENERAL: PAINLEVEL_OUTOF10: 0

## 2023-11-13 NOTE — ED NOTES
ED TO INPATIENT SBAR HANDOFF    Patient Name: Sonya Felix   :  1940  78 y.o. MRN:  [id number]  Preferred Name  [address]  ED Room #:  S38/L90-74  Family/Caregiver Present no   Restraints no   Sitter no   Sepsis Risk Score Sepsis Risk Score: 1.06    Situation  Code Status: Prior No additional code details. Allergies: Patient has no known allergies. Weight: Patient Vitals for the past 96 hrs (Last 3 readings):   Weight   23 1007 48.4 kg (106 lb 9.6 oz)     Arrived from: home  Chief Complaint:   Chief Complaint   Patient presents with    Sore     Patient has a pressure sore on the left groin. Patient stated that this started x2 days ago. Hospital Problem/Diagnosis:  Principal Problem:    Hydronephrosis  Resolved Problems:    * No resolved hospital problems. *    Imaging:   CT ABDOMEN PELVIS W IV CONTRAST Additional Contrast? Oral   Final Result   1. Stable appearance of a suprapubic catheter via a left inguinal approach along   the tract of previously noted vesicocutaneous fistula. The tract appears mature. There is no abnormal collection along that tract. 2. Moderate to severe right hydronephrosis along with right hydroureter without   obstructing calculus. 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            Abnormal labs:   Abnormal Labs Reviewed   CBC WITH AUTO DIFFERENTIAL - Abnormal; Notable for the following components:       Result Value    RBC 3.73 (*)     Hemoglobin 10.7 (*)     Hematocrit 32.4 (*)     All other components within normal limits   BASIC METABOLIC PANEL W/ REFLEX TO MG FOR LOW K - Abnormal; Notable for the following components:    Glucose 104 (*)     Creatinine 1.5 (*)     Est, Glom Filt Rate 34 (*)     All other components within normal limits   URINALYSIS WITH MICROSCOPIC - Abnormal; Notable for the following components:    Blood, Urine MODERATE (*)     Protein, UA TRACE (*)     Nitrite, Urine POSITIVE (*)     Leukocyte Esterase,

## 2023-11-13 NOTE — ED PROVIDER NOTES
ED Attending Attestation Note     Date of evaluation: 11/13/2023    This patient was seen by the resident. I have seen and examined the patient, agree with the workup, evaluation, management and diagnosis. The care plan has been discussed. My assessment reveals a slender, elderly patient, although much younger appearing than her stated age. 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 She presents to the emergency department with complaints of gradually increasing pain in that area, now with some surrounding drainage. On examination, there is a fistulous wound in the left groin, with indwelling Moreno catheter present, passing through this fistula into the bladder. There is some drainage around the catheter, although no warmth, erythema, edema to the wound. There is tenderness immediately surrounding the fistula, but no generalized abdominal tenderness to palpation.      Maurilio Mayers MD  11/19/23 7921

## 2023-11-13 NOTE — H&P
V2.0  History and Physical      Name:  Katia Alberto /Age/Sex: 1940  (78 y.o. female)   MRN & CSN:  [id number] & [id number] Encounter Date/Time: 2023 6:35 PM EST   Location:  [place] [address] PCP: Jaime Starr MD       Hospital Day: 1    Assessment and Plan:       -Moderate to severe right-sided hydronephrosis  Patient is planned for IR guided nephrostomy tube placement on the right side tomorrow. Urology consultation    -Vesicocutaneous fistula  -Recurrent renal stones  -History of bladder cancer   suprapubic catheter in place    -Acute encephalopathy due to UTI  - UTI  UA positive for urinary tract infection. According to son patient's mental function is altered. Start IV ceftriaxone  Obtain urine culture    -Acute kidney injury  Likely obstructive in nature related to hydronephrosis. Monitor GFR  Avoid nephrotoxic agents      Hospital Problems             Last Modified POA    * (Principal) Hydronephrosis 2023 Yes       Disposition:     Expected Disposition: Home  Estimated D/C: 2-3 days    Diet Diet NPO   DVT Prophylaxis [] Lovenox, []  Heparin, [] SCDs, [] Ambulation,  [] Eliquis, [] Xarelto, [] Coumadin   Code Status Prior   Surrogate Decision Maker/ POA      Personally reviewed Lab Studies and Imaging           History from:     patient    History of Present Illness:     Chief Complaint:   Sore (Patient has a pressure sore on the left groin. Patient stated that this started x2 days ago. )      HPI    78 y.o. female with PMH CKD, bladder cancer and vesico-cutaneous fistula w/ indwelling catheter, recurrent kidney stones, long history of indwelling martinez and previous percutaneous nephrostomy tube who presents with vaginal/groin pain and concerns for pressure sore. Patient states she has been having pain on the L side of her groin for approximately 1 month but it has worsened in the last 7 days. Notes that she feels like \"walking too much\" could account for her pain.  Of note, she is being

## 2023-11-14 ENCOUNTER — APPOINTMENT (OUTPATIENT)
Dept: INTERVENTIONAL RADIOLOGY/VASCULAR | Age: 83
DRG: 698 | End: 2023-11-14
Payer: MEDICARE

## 2023-11-14 LAB
ALBUMIN SERPL-MCNC: 3.9 G/DL (ref 3.4–5)
ALBUMIN/GLOB SERPL: 1.1 {RATIO} (ref 1.1–2.2)
ALP SERPL-CCNC: 77 U/L (ref 40–129)
ALT SERPL-CCNC: 7 U/L (ref 10–40)
ANION GAP SERPL CALCULATED.3IONS-SCNC: 15 MMOL/L (ref 3–16)
AST SERPL-CCNC: 20 U/L (ref 15–37)
BASOPHILS # BLD: 0.1 K/UL (ref 0–0.2)
BASOPHILS NFR BLD: 0.4 %
BILIRUB SERPL-MCNC: 0.3 MG/DL (ref 0–1)
BUN SERPL-MCNC: 17 MG/DL (ref 7–20)
CALCIUM SERPL-MCNC: 9.3 MG/DL (ref 8.3–10.6)
CHLORIDE SERPL-SCNC: 101 MMOL/L (ref 99–110)
CO2 SERPL-SCNC: 23 MMOL/L (ref 21–32)
CREAT SERPL-MCNC: 1.5 MG/DL (ref 0.6–1.2)
DEPRECATED RDW RBC AUTO: 14.6 % (ref 12.4–15.4)
EOSINOPHIL # BLD: 0.1 K/UL (ref 0–0.6)
EOSINOPHIL NFR BLD: 0.9 %
GFR SERPLBLD CREATININE-BSD FMLA CKD-EPI: 34 ML/MIN/{1.73_M2}
GLUCOSE SERPL-MCNC: 138 MG/DL (ref 70–99)
HCT VFR BLD AUTO: 31.1 % (ref 36–48)
HGB BLD-MCNC: 10.2 G/DL (ref 12–16)
LYMPHOCYTES # BLD: 0.8 K/UL (ref 1–5.1)
LYMPHOCYTES NFR BLD: 6 %
MCH RBC QN AUTO: 28.5 PG (ref 26–34)
MCHC RBC AUTO-ENTMCNC: 32.6 G/DL (ref 31–36)
MCV RBC AUTO: 87.6 FL (ref 80–100)
MONOCYTES # BLD: 0.5 K/UL (ref 0–1.3)
MONOCYTES NFR BLD: 4 %
NEUTROPHILS # BLD: 11.5 K/UL (ref 1.7–7.7)
NEUTROPHILS NFR BLD: 88.7 %
PLATELET # BLD AUTO: 244 K/UL (ref 135–450)
PMV BLD AUTO: 6.8 FL (ref 5–10.5)
POTASSIUM SERPL-SCNC: 4.3 MMOL/L (ref 3.5–5.1)
PROT SERPL-MCNC: 7.6 G/DL (ref 6.4–8.2)
RBC # BLD AUTO: 3.56 M/UL (ref 4–5.2)
SODIUM SERPL-SCNC: 139 MMOL/L (ref 136–145)
WBC # BLD AUTO: 13 K/UL (ref 4–11)

## 2023-11-14 PROCEDURE — 50432 PLMT NEPHROSTOMY CATHETER: CPT

## 2023-11-14 PROCEDURE — 77002 NEEDLE LOCALIZATION BY XRAY: CPT

## 2023-11-14 PROCEDURE — C1729 CATH, DRAINAGE: HCPCS

## 2023-11-14 PROCEDURE — 51102 DRAIN BL W/CATH INSERTION: CPT

## 2023-11-14 PROCEDURE — 99152 MOD SED SAME PHYS/QHP 5/>YRS: CPT

## 2023-11-14 PROCEDURE — 6360000004 HC RX CONTRAST MEDICATION: Performed by: STUDENT IN AN ORGANIZED HEALTH CARE EDUCATION/TRAINING PROGRAM

## 2023-11-14 PROCEDURE — 2580000003 HC RX 258: Performed by: INTERNAL MEDICINE

## 2023-11-14 PROCEDURE — 2709999900 HC NON-CHARGEABLE SUPPLY

## 2023-11-14 PROCEDURE — 99153 MOD SED SAME PHYS/QHP EA: CPT

## 2023-11-14 PROCEDURE — C1769 GUIDE WIRE: HCPCS

## 2023-11-14 PROCEDURE — 36415 COLL VENOUS BLD VENIPUNCTURE: CPT

## 2023-11-14 PROCEDURE — 1200000000 HC SEMI PRIVATE

## 2023-11-14 PROCEDURE — 80053 COMPREHEN METABOLIC PANEL: CPT

## 2023-11-14 PROCEDURE — 6360000002 HC RX W HCPCS: Performed by: INTERNAL MEDICINE

## 2023-11-14 PROCEDURE — 6360000002 HC RX W HCPCS

## 2023-11-14 PROCEDURE — 2500000003 HC RX 250 WO HCPCS

## 2023-11-14 PROCEDURE — 0T9030Z DRAINAGE OF RIGHT KIDNEY WITH DRAINAGE DEVICE, PERCUTANEOUS APPROACH: ICD-10-PCS | Performed by: STUDENT IN AN ORGANIZED HEALTH CARE EDUCATION/TRAINING PROGRAM

## 2023-11-14 PROCEDURE — 85025 COMPLETE CBC W/AUTO DIFF WBC: CPT

## 2023-11-14 RX ORDER — LABETALOL HYDROCHLORIDE 5 MG/ML
10 INJECTION, SOLUTION INTRAVENOUS EVERY 4 HOURS PRN
Status: DISCONTINUED | OUTPATIENT
Start: 2023-11-14 | End: 2023-11-20 | Stop reason: HOSPADM

## 2023-11-14 RX ORDER — HALOPERIDOL 5 MG/ML
1 INJECTION INTRAMUSCULAR ONCE
Status: COMPLETED | OUTPATIENT
Start: 2023-11-14 | End: 2023-11-14

## 2023-11-14 RX ORDER — LABETALOL HYDROCHLORIDE 5 MG/ML
20 INJECTION, SOLUTION INTRAVENOUS ONCE
Status: COMPLETED | OUTPATIENT
Start: 2023-11-14 | End: 2023-11-14

## 2023-11-14 RX ORDER — HALOPERIDOL 5 MG/ML
2 INJECTION INTRAMUSCULAR ONCE
Status: DISCONTINUED | OUTPATIENT
Start: 2023-11-14 | End: 2023-11-16

## 2023-11-14 RX ADMIN — ONDANSETRON 4 MG: 2 INJECTION INTRAMUSCULAR; INTRAVENOUS at 19:23

## 2023-11-14 RX ADMIN — HALOPERIDOL LACTATE 1 MG: 5 INJECTION, SOLUTION INTRAMUSCULAR at 18:14

## 2023-11-14 RX ADMIN — CEFTRIAXONE 1000 MG: 1 INJECTION, POWDER, FOR SOLUTION INTRAMUSCULAR; INTRAVENOUS at 17:01

## 2023-11-14 RX ADMIN — LABETALOL HYDROCHLORIDE 20 MG: 5 INJECTION, SOLUTION INTRAVENOUS at 19:38

## 2023-11-14 RX ADMIN — IOHEXOL 50 ML: 350 INJECTION, SOLUTION INTRAVENOUS at 15:46

## 2023-11-15 LAB
ALBUMIN SERPL-MCNC: 3.4 G/DL (ref 3.4–5)
ALBUMIN/GLOB SERPL: 0.9 {RATIO} (ref 1.1–2.2)
ALP SERPL-CCNC: 70 U/L (ref 40–129)
ALT SERPL-CCNC: 8 U/L (ref 10–40)
ANION GAP SERPL CALCULATED.3IONS-SCNC: 14 MMOL/L (ref 3–16)
AST SERPL-CCNC: 21 U/L (ref 15–37)
BASOPHILS # BLD: 0.1 K/UL (ref 0–0.2)
BASOPHILS NFR BLD: 0.5 %
BILIRUB SERPL-MCNC: <0.2 MG/DL (ref 0–1)
BUN SERPL-MCNC: 22 MG/DL (ref 7–20)
CALCIUM SERPL-MCNC: 9.1 MG/DL (ref 8.3–10.6)
CHLORIDE SERPL-SCNC: 102 MMOL/L (ref 99–110)
CO2 SERPL-SCNC: 21 MMOL/L (ref 21–32)
CREAT SERPL-MCNC: 1.9 MG/DL (ref 0.6–1.2)
DEPRECATED RDW RBC AUTO: 15 % (ref 12.4–15.4)
EOSINOPHIL # BLD: 0 K/UL (ref 0–0.6)
EOSINOPHIL NFR BLD: 0.2 %
GFR SERPLBLD CREATININE-BSD FMLA CKD-EPI: 25 ML/MIN/{1.73_M2}
GLUCOSE SERPL-MCNC: 143 MG/DL (ref 70–99)
HCT VFR BLD AUTO: 29.6 % (ref 36–48)
HGB BLD-MCNC: 9.4 G/DL (ref 12–16)
LYMPHOCYTES # BLD: 0.8 K/UL (ref 1–5.1)
LYMPHOCYTES NFR BLD: 6.4 %
MCH RBC QN AUTO: 28.2 PG (ref 26–34)
MCHC RBC AUTO-ENTMCNC: 31.8 G/DL (ref 31–36)
MCV RBC AUTO: 88.8 FL (ref 80–100)
MONOCYTES # BLD: 0.6 K/UL (ref 0–1.3)
MONOCYTES NFR BLD: 5.4 %
NEUTROPHILS # BLD: 10.5 K/UL (ref 1.7–7.7)
NEUTROPHILS NFR BLD: 87.5 %
PLATELET # BLD AUTO: 229 K/UL (ref 135–450)
PMV BLD AUTO: 6.8 FL (ref 5–10.5)
POTASSIUM SERPL-SCNC: 4.5 MMOL/L (ref 3.5–5.1)
PROT SERPL-MCNC: 7.1 G/DL (ref 6.4–8.2)
RBC # BLD AUTO: 3.33 M/UL (ref 4–5.2)
SODIUM SERPL-SCNC: 137 MMOL/L (ref 136–145)
WBC # BLD AUTO: 12 K/UL (ref 4–11)

## 2023-11-15 PROCEDURE — 2580000003 HC RX 258: Performed by: STUDENT IN AN ORGANIZED HEALTH CARE EDUCATION/TRAINING PROGRAM

## 2023-11-15 PROCEDURE — 85025 COMPLETE CBC W/AUTO DIFF WBC: CPT

## 2023-11-15 PROCEDURE — 6360000002 HC RX W HCPCS: Performed by: INTERNAL MEDICINE

## 2023-11-15 PROCEDURE — 2580000003 HC RX 258: Performed by: INTERNAL MEDICINE

## 2023-11-15 PROCEDURE — 6360000002 HC RX W HCPCS: Performed by: STUDENT IN AN ORGANIZED HEALTH CARE EDUCATION/TRAINING PROGRAM

## 2023-11-15 PROCEDURE — 36415 COLL VENOUS BLD VENIPUNCTURE: CPT

## 2023-11-15 PROCEDURE — 1200000000 HC SEMI PRIVATE

## 2023-11-15 PROCEDURE — 99223 1ST HOSP IP/OBS HIGH 75: CPT | Performed by: INTERNAL MEDICINE

## 2023-11-15 PROCEDURE — 80053 COMPREHEN METABOLIC PANEL: CPT

## 2023-11-15 RX ORDER — SODIUM CHLORIDE, SODIUM LACTATE, POTASSIUM CHLORIDE, CALCIUM CHLORIDE 600; 310; 30; 20 MG/100ML; MG/100ML; MG/100ML; MG/100ML
INJECTION, SOLUTION INTRAVENOUS CONTINUOUS
Status: DISCONTINUED | OUTPATIENT
Start: 2023-11-15 | End: 2023-11-18

## 2023-11-15 RX ADMIN — PIPERACILLIN AND TAZOBACTAM 4500 MG: 4; .5 INJECTION, POWDER, LYOPHILIZED, FOR SOLUTION INTRAVENOUS at 10:18

## 2023-11-15 RX ADMIN — HEPARIN SODIUM 5000 UNITS: 5000 INJECTION INTRAVENOUS; SUBCUTANEOUS at 21:15

## 2023-11-15 RX ADMIN — SODIUM CHLORIDE, POTASSIUM CHLORIDE, SODIUM LACTATE AND CALCIUM CHLORIDE: 600; 310; 30; 20 INJECTION, SOLUTION INTRAVENOUS at 10:26

## 2023-11-15 RX ADMIN — SODIUM CHLORIDE, PRESERVATIVE FREE 10 ML: 5 INJECTION INTRAVENOUS at 22:27

## 2023-11-15 RX ADMIN — HEPARIN SODIUM 5000 UNITS: 5000 INJECTION INTRAVENOUS; SUBCUTANEOUS at 10:09

## 2023-11-15 RX ADMIN — SODIUM CHLORIDE, PRESERVATIVE FREE 10 ML: 5 INJECTION INTRAVENOUS at 10:19

## 2023-11-15 RX ADMIN — PIPERACILLIN AND TAZOBACTAM 3375 MG: 3; .375 INJECTION, POWDER, LYOPHILIZED, FOR SOLUTION INTRAVENOUS at 22:24

## 2023-11-15 ASSESSMENT — PAIN SCALES - GENERAL
PAINLEVEL_OUTOF10: 0
PAINLEVEL_OUTOF10: 0

## 2023-11-15 NOTE — CONSULTS
Nephrology Consult Note                                                                                                                                                                                                                                                                                                                                                               Office : [phone]     Fax :[phone]    Patient's Name: Shar Menchaca  10:08 AM  11/15/2023    Reason for Consult: ABNER   Requesting Physician:  Karen Olivo MD  Chief Complaint:    Chief Complaint   Patient presents with    Sore     Patient has a pressure sore on the left groin. Patient stated that this started x2 days ago. Assessment/Plan     # ABNER   - Suspect secondary to obstructive uropathy (severe R hydro) / GUME  - Received IV contrast 11/13  - S/p R neph tube placement on 11/14  - On continuous IVF at 75 mL/hr   - Monitor I/O's  - Avoid NSAID's and nephrotoxins as able  - BMP in AM    # HTN  - Aldactone on hold  - BP's overall controlled    # Anemia  - Monitor CBC    # Acid- base/ Electrolyte imbalance   - Lytes stable  - Monitor BMP    # Severe R hydronephrosis  - Urology consulted  - H/o R-sided stone s/p percutaneous nephrolithotomy  - CT A/P 11/13: mod-severe hydro, no obstructing calculus   - S/p R PCN placement 11/14    # Urothelial carcinoma / vesicocutaneous fistula   - Urology following   - S/p catheter in fistula tract     # UTI  - Abx management, per primary       History of Present Ilness:    Shar Menchaca is a 78 y.o. female with a PMH of bladder cancer and vesico-cutaneous fistula w/ indwelling catheter, recurrent kidney stones, long history of indwelling martinez and previous percutaneous nephrostomy tube who presents with vaginal/groin pain and concerns for pressure sore. Patient states she has been having pain on the L side of her groin for approximately 1 month but it has worsened in the last 7 days.

## 2023-11-15 NOTE — CARE COORDINATION
10:23 AM  LVM for Myranda Hernandes, patient's daughter regarding dispo planning. Patient in bilat restraints (must be out for 24 hrs prior to any facility dc.)  CM continuing to follow for dispo planning and support.      Electronically signed by Earl Patterson RN, CM on 11/15/2023 at 10:25 AM.  Phone: [phone]  Fax: [phone]

## 2023-11-16 ENCOUNTER — APPOINTMENT (OUTPATIENT)
Dept: NUCLEAR MEDICINE | Age: 83
DRG: 698 | End: 2023-11-16
Payer: MEDICARE

## 2023-11-16 LAB
ALBUMIN SERPL-MCNC: 3.1 G/DL (ref 3.4–5)
ALBUMIN/GLOB SERPL: 1 {RATIO} (ref 1.1–2.2)
ALP SERPL-CCNC: 60 U/L (ref 40–129)
ALT SERPL-CCNC: 9 U/L (ref 10–40)
ANION GAP SERPL CALCULATED.3IONS-SCNC: 10 MMOL/L (ref 3–16)
AST SERPL-CCNC: 24 U/L (ref 15–37)
BASOPHILS # BLD: 0 K/UL (ref 0–0.2)
BASOPHILS NFR BLD: 0.5 %
BILIRUB SERPL-MCNC: 0.3 MG/DL (ref 0–1)
BUN SERPL-MCNC: 22 MG/DL (ref 7–20)
CALCIUM SERPL-MCNC: 8.5 MG/DL (ref 8.3–10.6)
CHLORIDE SERPL-SCNC: 106 MMOL/L (ref 99–110)
CO2 SERPL-SCNC: 25 MMOL/L (ref 21–32)
CREAT SERPL-MCNC: 2.5 MG/DL (ref 0.6–1.2)
DEPRECATED RDW RBC AUTO: 15 % (ref 12.4–15.4)
EOSINOPHIL # BLD: 0.3 K/UL (ref 0–0.6)
EOSINOPHIL NFR BLD: 3.8 %
GFR SERPLBLD CREATININE-BSD FMLA CKD-EPI: 18 ML/MIN/{1.73_M2}
GLUCOSE SERPL-MCNC: 105 MG/DL (ref 70–99)
HCT VFR BLD AUTO: 26.4 % (ref 36–48)
HGB BLD-MCNC: 8.7 G/DL (ref 12–16)
LYMPHOCYTES # BLD: 2 K/UL (ref 1–5.1)
LYMPHOCYTES NFR BLD: 25 %
MCH RBC QN AUTO: 28.8 PG (ref 26–34)
MCHC RBC AUTO-ENTMCNC: 33.2 G/DL (ref 31–36)
MCV RBC AUTO: 86.8 FL (ref 80–100)
MONOCYTES # BLD: 0.8 K/UL (ref 0–1.3)
MONOCYTES NFR BLD: 10.2 %
NEUTROPHILS # BLD: 4.9 K/UL (ref 1.7–7.7)
NEUTROPHILS NFR BLD: 60.5 %
PLATELET # BLD AUTO: 194 K/UL (ref 135–450)
PMV BLD AUTO: 6.9 FL (ref 5–10.5)
POTASSIUM SERPL-SCNC: 4.1 MMOL/L (ref 3.5–5.1)
PROT SERPL-MCNC: 6.3 G/DL (ref 6.4–8.2)
RBC # BLD AUTO: 3.04 M/UL (ref 4–5.2)
SODIUM SERPL-SCNC: 141 MMOL/L (ref 136–145)
SODIUM UR-SCNC: 50 MMOL/L
WBC # BLD AUTO: 8.1 K/UL (ref 4–11)

## 2023-11-16 PROCEDURE — 84156 ASSAY OF PROTEIN URINE: CPT

## 2023-11-16 PROCEDURE — 80053 COMPREHEN METABOLIC PANEL: CPT

## 2023-11-16 PROCEDURE — 85025 COMPLETE CBC W/AUTO DIFF WBC: CPT

## 2023-11-16 PROCEDURE — 78707 K FLOW/FUNCT IMAGE W/O DRUG: CPT

## 2023-11-16 PROCEDURE — 6360000002 HC RX W HCPCS: Performed by: INTERNAL MEDICINE

## 2023-11-16 PROCEDURE — 2580000003 HC RX 258: Performed by: INTERNAL MEDICINE

## 2023-11-16 PROCEDURE — 6360000002 HC RX W HCPCS: Performed by: STUDENT IN AN ORGANIZED HEALTH CARE EDUCATION/TRAINING PROGRAM

## 2023-11-16 PROCEDURE — 1200000000 HC SEMI PRIVATE

## 2023-11-16 PROCEDURE — 84300 ASSAY OF URINE SODIUM: CPT

## 2023-11-16 PROCEDURE — 99223 1ST HOSP IP/OBS HIGH 75: CPT | Performed by: INTERNAL MEDICINE

## 2023-11-16 PROCEDURE — 36415 COLL VENOUS BLD VENIPUNCTURE: CPT

## 2023-11-16 PROCEDURE — 82570 ASSAY OF URINE CREATININE: CPT

## 2023-11-16 PROCEDURE — 2580000003 HC RX 258: Performed by: STUDENT IN AN ORGANIZED HEALTH CARE EDUCATION/TRAINING PROGRAM

## 2023-11-16 RX ADMIN — HEPARIN SODIUM 5000 UNITS: 5000 INJECTION INTRAVENOUS; SUBCUTANEOUS at 21:11

## 2023-11-16 RX ADMIN — WATER 2.5 MG: 1 INJECTION INTRAMUSCULAR; INTRAVENOUS; SUBCUTANEOUS at 21:43

## 2023-11-16 RX ADMIN — HEPARIN SODIUM 5000 UNITS: 5000 INJECTION INTRAVENOUS; SUBCUTANEOUS at 11:24

## 2023-11-16 RX ADMIN — PIPERACILLIN AND TAZOBACTAM 3375 MG: 3; .375 INJECTION, POWDER, LYOPHILIZED, FOR SOLUTION INTRAVENOUS at 21:56

## 2023-11-16 RX ADMIN — SODIUM CHLORIDE, PRESERVATIVE FREE 10 ML: 5 INJECTION INTRAVENOUS at 21:59

## 2023-11-16 RX ADMIN — SODIUM CHLORIDE, POTASSIUM CHLORIDE, SODIUM LACTATE AND CALCIUM CHLORIDE: 600; 310; 30; 20 INJECTION, SOLUTION INTRAVENOUS at 21:39

## 2023-11-16 RX ADMIN — PIPERACILLIN AND TAZOBACTAM 3375 MG: 3; .375 INJECTION, POWDER, LYOPHILIZED, FOR SOLUTION INTRAVENOUS at 11:14

## 2023-11-16 RX ADMIN — SODIUM CHLORIDE, PRESERVATIVE FREE 10 ML: 5 INJECTION INTRAVENOUS at 09:27

## 2023-11-16 RX ADMIN — SODIUM CHLORIDE, POTASSIUM CHLORIDE, SODIUM LACTATE AND CALCIUM CHLORIDE: 600; 310; 30; 20 INJECTION, SOLUTION INTRAVENOUS at 00:37

## 2023-11-16 RX ADMIN — Medication 7 MILLICURIE: at 15:25

## 2023-11-16 ASSESSMENT — PAIN SCALES - GENERAL
PAINLEVEL_OUTOF10: 0

## 2023-11-16 NOTE — CARE COORDINATION
1:37 PM  LVM for daughter Brady Hendrix.    Patient out of restraints since 1425 yesterday  May need placement; unable to reach daughter at this time for dispo     Electronically signed by Placido Rosa RN, CM on 11/16/2023 at 1:40 PM.  Phone: [phone]  Fax: [phone]

## 2023-11-17 LAB
ALBUMIN SERPL-MCNC: 3 G/DL (ref 3.4–5)
ALBUMIN/GLOB SERPL: 0.9 {RATIO} (ref 1.1–2.2)
ALP SERPL-CCNC: 70 U/L (ref 40–129)
ALT SERPL-CCNC: 10 U/L (ref 10–40)
ANION GAP SERPL CALCULATED.3IONS-SCNC: 11 MMOL/L (ref 3–16)
AST SERPL-CCNC: 23 U/L (ref 15–37)
BASOPHILS # BLD: 0 K/UL (ref 0–0.2)
BASOPHILS NFR BLD: 0.5 %
BILIRUB SERPL-MCNC: <0.2 MG/DL (ref 0–1)
BUN SERPL-MCNC: 16 MG/DL (ref 7–20)
CALCIUM SERPL-MCNC: 8.7 MG/DL (ref 8.3–10.6)
CHLORIDE SERPL-SCNC: 109 MMOL/L (ref 99–110)
CO2 SERPL-SCNC: 23 MMOL/L (ref 21–32)
CREAT SERPL-MCNC: 1.9 MG/DL (ref 0.6–1.2)
DEPRECATED RDW RBC AUTO: 14.6 % (ref 12.4–15.4)
EOSINOPHIL # BLD: 0.5 K/UL (ref 0–0.6)
EOSINOPHIL NFR BLD: 8.1 %
GFR SERPLBLD CREATININE-BSD FMLA CKD-EPI: 25 ML/MIN/{1.73_M2}
GLUCOSE SERPL-MCNC: 106 MG/DL (ref 70–99)
HCT VFR BLD AUTO: 27.7 % (ref 36–48)
HGB BLD-MCNC: 9 G/DL (ref 12–16)
LYMPHOCYTES # BLD: 1.7 K/UL (ref 1–5.1)
LYMPHOCYTES NFR BLD: 27 %
MCH RBC QN AUTO: 28.6 PG (ref 26–34)
MCHC RBC AUTO-ENTMCNC: 32.3 G/DL (ref 31–36)
MCV RBC AUTO: 88.5 FL (ref 80–100)
MONOCYTES # BLD: 0.5 K/UL (ref 0–1.3)
MONOCYTES NFR BLD: 7.7 %
NEUTROPHILS # BLD: 3.7 K/UL (ref 1.7–7.7)
NEUTROPHILS NFR BLD: 56.7 %
PLATELET # BLD AUTO: 191 K/UL (ref 135–450)
PMV BLD AUTO: 7.1 FL (ref 5–10.5)
POTASSIUM SERPL-SCNC: 3.8 MMOL/L (ref 3.5–5.1)
PROT SERPL-MCNC: 6.2 G/DL (ref 6.4–8.2)
RBC # BLD AUTO: 3.13 M/UL (ref 4–5.2)
SODIUM SERPL-SCNC: 143 MMOL/L (ref 136–145)
WBC # BLD AUTO: 6.4 K/UL (ref 4–11)

## 2023-11-17 PROCEDURE — 80053 COMPREHEN METABOLIC PANEL: CPT

## 2023-11-17 PROCEDURE — 36415 COLL VENOUS BLD VENIPUNCTURE: CPT

## 2023-11-17 PROCEDURE — 85025 COMPLETE CBC W/AUTO DIFF WBC: CPT

## 2023-11-17 PROCEDURE — 1200000000 HC SEMI PRIVATE

## 2023-11-17 PROCEDURE — 2580000003 HC RX 258: Performed by: INTERNAL MEDICINE

## 2023-11-17 PROCEDURE — 99233 SBSQ HOSP IP/OBS HIGH 50: CPT | Performed by: INTERNAL MEDICINE

## 2023-11-17 PROCEDURE — 6360000002 HC RX W HCPCS: Performed by: INTERNAL MEDICINE

## 2023-11-17 PROCEDURE — 2580000003 HC RX 258: Performed by: STUDENT IN AN ORGANIZED HEALTH CARE EDUCATION/TRAINING PROGRAM

## 2023-11-17 PROCEDURE — 6360000002 HC RX W HCPCS: Performed by: STUDENT IN AN ORGANIZED HEALTH CARE EDUCATION/TRAINING PROGRAM

## 2023-11-17 RX ADMIN — SODIUM CHLORIDE, PRESERVATIVE FREE 10 ML: 5 INJECTION INTRAVENOUS at 10:46

## 2023-11-17 RX ADMIN — PIPERACILLIN AND TAZOBACTAM 3375 MG: 3; .375 INJECTION, POWDER, LYOPHILIZED, FOR SOLUTION INTRAVENOUS at 10:45

## 2023-11-17 RX ADMIN — PIPERACILLIN AND TAZOBACTAM 3375 MG: 3; .375 INJECTION, POWDER, LYOPHILIZED, FOR SOLUTION INTRAVENOUS at 21:38

## 2023-11-17 RX ADMIN — HEPARIN SODIUM 5000 UNITS: 5000 INJECTION INTRAVENOUS; SUBCUTANEOUS at 10:45

## 2023-11-17 RX ADMIN — HEPARIN SODIUM 5000 UNITS: 5000 INJECTION INTRAVENOUS; SUBCUTANEOUS at 21:21

## 2023-11-17 ASSESSMENT — PAIN SCALES - GENERAL
PAINLEVEL_OUTOF10: 0

## 2023-11-17 NOTE — CARE COORDINATION
2:02 PM  Dr Kelly Joy able to reach and speak with daughter. Daughter Bre Guerrier is talking to her brother to discuss poss placement   CM following.     Electronically signed by Mandy Brady RN, CM on 11/17/2023 at 2:03 PM.  Phone: [phone]  Fax: [phone]

## 2023-11-18 LAB
ALBUMIN SERPL-MCNC: 3.1 G/DL (ref 3.4–5)
ALBUMIN/GLOB SERPL: 0.9 {RATIO} (ref 1.1–2.2)
ALP SERPL-CCNC: 65 U/L (ref 40–129)
ALT SERPL-CCNC: 10 U/L (ref 10–40)
ANION GAP SERPL CALCULATED.3IONS-SCNC: 10 MMOL/L (ref 3–16)
AST SERPL-CCNC: 19 U/L (ref 15–37)
BASOPHILS # BLD: 0 K/UL (ref 0–0.2)
BASOPHILS NFR BLD: 0.4 %
BILIRUB SERPL-MCNC: <0.2 MG/DL (ref 0–1)
BUN SERPL-MCNC: 13 MG/DL (ref 7–20)
CALCIUM SERPL-MCNC: 8.7 MG/DL (ref 8.3–10.6)
CHLORIDE SERPL-SCNC: 108 MMOL/L (ref 99–110)
CO2 SERPL-SCNC: 24 MMOL/L (ref 21–32)
CREAT SERPL-MCNC: 1.7 MG/DL (ref 0.6–1.2)
DEPRECATED RDW RBC AUTO: 14.7 % (ref 12.4–15.4)
EOSINOPHIL # BLD: 0.6 K/UL (ref 0–0.6)
EOSINOPHIL NFR BLD: 9.1 %
GFR SERPLBLD CREATININE-BSD FMLA CKD-EPI: 29 ML/MIN/{1.73_M2}
GLUCOSE SERPL-MCNC: 102 MG/DL (ref 70–99)
HCT VFR BLD AUTO: 29.8 % (ref 36–48)
HGB BLD-MCNC: 9.7 G/DL (ref 12–16)
LYMPHOCYTES # BLD: 2.4 K/UL (ref 1–5.1)
LYMPHOCYTES NFR BLD: 38 %
MAGNESIUM SERPL-MCNC: 1.9 MG/DL (ref 1.8–2.4)
MCH RBC QN AUTO: 28.3 PG (ref 26–34)
MCHC RBC AUTO-ENTMCNC: 32.7 G/DL (ref 31–36)
MCV RBC AUTO: 86.6 FL (ref 80–100)
MONOCYTES # BLD: 0.5 K/UL (ref 0–1.3)
MONOCYTES NFR BLD: 8.3 %
NEUTROPHILS # BLD: 2.8 K/UL (ref 1.7–7.7)
NEUTROPHILS NFR BLD: 44.2 %
PLATELET # BLD AUTO: 213 K/UL (ref 135–450)
PMV BLD AUTO: 7.4 FL (ref 5–10.5)
POTASSIUM SERPL-SCNC: 3.3 MMOL/L (ref 3.5–5.1)
PROT SERPL-MCNC: 6.4 G/DL (ref 6.4–8.2)
RBC # BLD AUTO: 3.44 M/UL (ref 4–5.2)
SODIUM SERPL-SCNC: 142 MMOL/L (ref 136–145)
WBC # BLD AUTO: 6.4 K/UL (ref 4–11)

## 2023-11-18 PROCEDURE — 83735 ASSAY OF MAGNESIUM: CPT

## 2023-11-18 PROCEDURE — 85025 COMPLETE CBC W/AUTO DIFF WBC: CPT

## 2023-11-18 PROCEDURE — 2580000003 HC RX 258: Performed by: STUDENT IN AN ORGANIZED HEALTH CARE EDUCATION/TRAINING PROGRAM

## 2023-11-18 PROCEDURE — 2580000003 HC RX 258: Performed by: INTERNAL MEDICINE

## 2023-11-18 PROCEDURE — 6360000002 HC RX W HCPCS: Performed by: INTERNAL MEDICINE

## 2023-11-18 PROCEDURE — 6360000002 HC RX W HCPCS: Performed by: STUDENT IN AN ORGANIZED HEALTH CARE EDUCATION/TRAINING PROGRAM

## 2023-11-18 PROCEDURE — 1200000000 HC SEMI PRIVATE

## 2023-11-18 PROCEDURE — 99233 SBSQ HOSP IP/OBS HIGH 50: CPT | Performed by: INTERNAL MEDICINE

## 2023-11-18 PROCEDURE — 80053 COMPREHEN METABOLIC PANEL: CPT

## 2023-11-18 PROCEDURE — 6370000000 HC RX 637 (ALT 250 FOR IP): Performed by: INTERNAL MEDICINE

## 2023-11-18 PROCEDURE — 36415 COLL VENOUS BLD VENIPUNCTURE: CPT

## 2023-11-18 RX ORDER — HALOPERIDOL 5 MG/ML
1 INJECTION INTRAMUSCULAR ONCE
Status: COMPLETED | OUTPATIENT
Start: 2023-11-18 | End: 2023-11-19

## 2023-11-18 RX ADMIN — SODIUM CHLORIDE, PRESERVATIVE FREE 10 ML: 5 INJECTION INTRAVENOUS at 20:54

## 2023-11-18 RX ADMIN — PIPERACILLIN AND TAZOBACTAM 3375 MG: 3; .375 INJECTION, POWDER, LYOPHILIZED, FOR SOLUTION INTRAVENOUS at 21:50

## 2023-11-18 RX ADMIN — HEPARIN SODIUM 5000 UNITS: 5000 INJECTION INTRAVENOUS; SUBCUTANEOUS at 10:58

## 2023-11-18 RX ADMIN — LABETALOL HYDROCHLORIDE 10 MG: 5 INJECTION, SOLUTION INTRAVENOUS at 20:49

## 2023-11-18 RX ADMIN — POTASSIUM BICARBONATE 20 MEQ: 782 TABLET, EFFERVESCENT ORAL at 06:48

## 2023-11-18 RX ADMIN — SODIUM CHLORIDE, PRESERVATIVE FREE 10 ML: 5 INJECTION INTRAVENOUS at 11:59

## 2023-11-18 RX ADMIN — HEPARIN SODIUM 5000 UNITS: 5000 INJECTION INTRAVENOUS; SUBCUTANEOUS at 20:46

## 2023-11-18 RX ADMIN — PIPERACILLIN AND TAZOBACTAM 3375 MG: 3; .375 INJECTION, POWDER, LYOPHILIZED, FOR SOLUTION INTRAVENOUS at 10:30

## 2023-11-18 ASSESSMENT — PAIN SCALES - GENERAL
PAINLEVEL_OUTOF10: 0

## 2023-11-18 NOTE — CARE COORDINATION
CM spoke with patient's daughter Indio Virk, over the phone. Indio Virk feels that her mom sounds lucid and would want to return home. Indio Virk is deferring to her brother Adilene Campbell. CM called Adilene Campbell who states he feels like his mother was not at baseline yesterday, but will come to the hospital to see her today. Adilene Campbell doesn't think his mother will agree to a nursing home placement. They are active with [facility] [address]. PT/OT pending at this time.     Lauren Hallman RN, BSN,    Ortho/Neuro   [phone]

## 2023-11-19 PROBLEM — N17.9 AKI (ACUTE KIDNEY INJURY) (HCC): Status: ACTIVE | Noted: 2023-11-19

## 2023-11-19 PROBLEM — I10 HYPERTENSION: Status: ACTIVE | Noted: 2023-11-19

## 2023-11-19 PROBLEM — E87.8 ELECTROLYTE IMBALANCE: Status: ACTIVE | Noted: 2023-11-19

## 2023-11-19 LAB
CREAT UR-MCNC: 107 MG/DL (ref 28–259)
PROT UR-MCNC: 41.7 MG/DL
PROT/CREAT UR-RTO: 0.4 MG/DL

## 2023-11-19 PROCEDURE — 6360000002 HC RX W HCPCS: Performed by: INTERNAL MEDICINE

## 2023-11-19 PROCEDURE — 2580000003 HC RX 258: Performed by: STUDENT IN AN ORGANIZED HEALTH CARE EDUCATION/TRAINING PROGRAM

## 2023-11-19 PROCEDURE — 6360000002 HC RX W HCPCS: Performed by: STUDENT IN AN ORGANIZED HEALTH CARE EDUCATION/TRAINING PROGRAM

## 2023-11-19 PROCEDURE — 2580000003 HC RX 258: Performed by: INTERNAL MEDICINE

## 2023-11-19 PROCEDURE — 6370000000 HC RX 637 (ALT 250 FOR IP): Performed by: INTERNAL MEDICINE

## 2023-11-19 PROCEDURE — 99233 SBSQ HOSP IP/OBS HIGH 50: CPT | Performed by: INTERNAL MEDICINE

## 2023-11-19 PROCEDURE — 1200000000 HC SEMI PRIVATE

## 2023-11-19 RX ADMIN — HALOPERIDOL LACTATE 1 MG: 5 INJECTION, SOLUTION INTRAMUSCULAR at 01:20

## 2023-11-19 RX ADMIN — HEPARIN SODIUM 5000 UNITS: 5000 INJECTION INTRAVENOUS; SUBCUTANEOUS at 23:19

## 2023-11-19 RX ADMIN — PIPERACILLIN AND TAZOBACTAM 3375 MG: 3; .375 INJECTION, POWDER, LYOPHILIZED, FOR SOLUTION INTRAVENOUS at 23:16

## 2023-11-19 RX ADMIN — SODIUM CHLORIDE, PRESERVATIVE FREE 10 ML: 5 INJECTION INTRAVENOUS at 08:36

## 2023-11-19 RX ADMIN — ACETAMINOPHEN 650 MG: 325 TABLET ORAL at 12:07

## 2023-11-19 RX ADMIN — PIPERACILLIN AND TAZOBACTAM 3375 MG: 3; .375 INJECTION, POWDER, LYOPHILIZED, FOR SOLUTION INTRAVENOUS at 08:27

## 2023-11-19 ASSESSMENT — PAIN DESCRIPTION - ONSET
ONSET: GRADUAL
ONSET: GRADUAL

## 2023-11-19 ASSESSMENT — PAIN DESCRIPTION - ORIENTATION
ORIENTATION: RIGHT
ORIENTATION: RIGHT

## 2023-11-19 ASSESSMENT — PAIN DESCRIPTION - DESCRIPTORS
DESCRIPTORS: DISCOMFORT
DESCRIPTORS: DISCOMFORT

## 2023-11-19 ASSESSMENT — PAIN DESCRIPTION - LOCATION
LOCATION: BACK
LOCATION: BACK

## 2023-11-19 ASSESSMENT — PAIN DESCRIPTION - PAIN TYPE
TYPE: ACUTE PAIN
TYPE: ACUTE PAIN

## 2023-11-19 ASSESSMENT — PAIN SCALES - GENERAL
PAINLEVEL_OUTOF10: 5
PAINLEVEL_OUTOF10: 3

## 2023-11-19 ASSESSMENT — PAIN - FUNCTIONAL ASSESSMENT
PAIN_FUNCTIONAL_ASSESSMENT: ACTIVITIES ARE NOT PREVENTED
PAIN_FUNCTIONAL_ASSESSMENT: ACTIVITIES ARE NOT PREVENTED

## 2023-11-19 ASSESSMENT — PAIN DESCRIPTION - FREQUENCY
FREQUENCY: INTERMITTENT
FREQUENCY: INTERMITTENT

## 2023-11-19 NOTE — CARE COORDINATION
Patient is from home with son, PT/OT pending at this time. Family is unsure that they will be able to care for her at home.     Paige Motta, RN, BSN,    Ortho/Neuro   [phone]

## 2023-11-20 ENCOUNTER — HOSPITAL ENCOUNTER (EMERGENCY)
Age: 83
Discharge: LEFT BEFORE DISCHARGE | End: 2023-11-20

## 2023-11-20 VITALS
DIASTOLIC BLOOD PRESSURE: 64 MMHG | WEIGHT: 106.6 LBS | OXYGEN SATURATION: 100 % | SYSTOLIC BLOOD PRESSURE: 118 MMHG | HEIGHT: 60 IN | HEART RATE: 74 BPM | TEMPERATURE: 97.9 F | BODY MASS INDEX: 20.93 KG/M2 | RESPIRATION RATE: 18 BRPM

## 2023-11-20 LAB
ALBUMIN SERPL-MCNC: 3.2 G/DL (ref 3.4–5)
ALBUMIN/GLOB SERPL: 1 {RATIO} (ref 1.1–2.2)
ALP SERPL-CCNC: 63 U/L (ref 40–129)
ALT SERPL-CCNC: 9 U/L (ref 10–40)
ANION GAP SERPL CALCULATED.3IONS-SCNC: 11 MMOL/L (ref 3–16)
AST SERPL-CCNC: 18 U/L (ref 15–37)
BASOPHILS # BLD: 0 K/UL (ref 0–0.2)
BASOPHILS NFR BLD: 0.7 %
BILIRUB SERPL-MCNC: <0.2 MG/DL (ref 0–1)
BUN SERPL-MCNC: 9 MG/DL (ref 7–20)
CALCIUM SERPL-MCNC: 8.4 MG/DL (ref 8.3–10.6)
CHLORIDE SERPL-SCNC: 111 MMOL/L (ref 99–110)
CO2 SERPL-SCNC: 23 MMOL/L (ref 21–32)
CREAT SERPL-MCNC: 1.6 MG/DL (ref 0.6–1.2)
DEPRECATED RDW RBC AUTO: 14.4 % (ref 12.4–15.4)
EOSINOPHIL # BLD: 0.7 K/UL (ref 0–0.6)
EOSINOPHIL NFR BLD: 10.6 %
GFR SERPLBLD CREATININE-BSD FMLA CKD-EPI: 31 ML/MIN/{1.73_M2}
GLUCOSE SERPL-MCNC: 93 MG/DL (ref 70–99)
HCT VFR BLD AUTO: 29.7 % (ref 36–48)
HGB BLD-MCNC: 9.6 G/DL (ref 12–16)
LYMPHOCYTES # BLD: 2.4 K/UL (ref 1–5.1)
LYMPHOCYTES NFR BLD: 36.4 %
MAGNESIUM SERPL-MCNC: 1.8 MG/DL (ref 1.8–2.4)
MCH RBC QN AUTO: 27.8 PG (ref 26–34)
MCHC RBC AUTO-ENTMCNC: 32.3 G/DL (ref 31–36)
MCV RBC AUTO: 86.2 FL (ref 80–100)
MONOCYTES # BLD: 0.5 K/UL (ref 0–1.3)
MONOCYTES NFR BLD: 6.8 %
NEUTROPHILS # BLD: 3 K/UL (ref 1.7–7.7)
NEUTROPHILS NFR BLD: 45.5 %
PLATELET # BLD AUTO: 263 K/UL (ref 135–450)
PMV BLD AUTO: 7.2 FL (ref 5–10.5)
POTASSIUM SERPL-SCNC: 3.3 MMOL/L (ref 3.5–5.1)
PROT SERPL-MCNC: 6.5 G/DL (ref 6.4–8.2)
RBC # BLD AUTO: 3.45 M/UL (ref 4–5.2)
SODIUM SERPL-SCNC: 145 MMOL/L (ref 136–145)
WBC # BLD AUTO: 6.6 K/UL (ref 4–11)

## 2023-11-20 PROCEDURE — 97530 THERAPEUTIC ACTIVITIES: CPT

## 2023-11-20 PROCEDURE — 83735 ASSAY OF MAGNESIUM: CPT

## 2023-11-20 PROCEDURE — 97116 GAIT TRAINING THERAPY: CPT

## 2023-11-20 PROCEDURE — 97162 PT EVAL MOD COMPLEX 30 MIN: CPT

## 2023-11-20 PROCEDURE — 80053 COMPREHEN METABOLIC PANEL: CPT

## 2023-11-20 PROCEDURE — 99232 SBSQ HOSP IP/OBS MODERATE 35: CPT | Performed by: INTERNAL MEDICINE

## 2023-11-20 PROCEDURE — 2580000003 HC RX 258: Performed by: STUDENT IN AN ORGANIZED HEALTH CARE EDUCATION/TRAINING PROGRAM

## 2023-11-20 PROCEDURE — 6360000002 HC RX W HCPCS: Performed by: STUDENT IN AN ORGANIZED HEALTH CARE EDUCATION/TRAINING PROGRAM

## 2023-11-20 PROCEDURE — 36415 COLL VENOUS BLD VENIPUNCTURE: CPT

## 2023-11-20 PROCEDURE — 6370000000 HC RX 637 (ALT 250 FOR IP): Performed by: INTERNAL MEDICINE

## 2023-11-20 PROCEDURE — 85025 COMPLETE CBC W/AUTO DIFF WBC: CPT

## 2023-11-20 PROCEDURE — 97535 SELF CARE MNGMENT TRAINING: CPT

## 2023-11-20 PROCEDURE — 6360000002 HC RX W HCPCS: Performed by: INTERNAL MEDICINE

## 2023-11-20 PROCEDURE — 97166 OT EVAL MOD COMPLEX 45 MIN: CPT

## 2023-11-20 PROCEDURE — 6370000000 HC RX 637 (ALT 250 FOR IP): Performed by: HOSPITALIST

## 2023-11-20 RX ORDER — LEVOFLOXACIN 500 MG/1
500 TABLET, FILM COATED ORAL EVERY OTHER DAY
Status: DISCONTINUED | OUTPATIENT
Start: 2023-11-20 | End: 2023-11-20 | Stop reason: HOSPADM

## 2023-11-20 RX ORDER — POTASSIUM CHLORIDE 20 MEQ/1
20 TABLET, EXTENDED RELEASE ORAL ONCE
Status: DISCONTINUED | OUTPATIENT
Start: 2023-11-20 | End: 2023-11-20 | Stop reason: HOSPADM

## 2023-11-20 RX ORDER — LEVOFLOXACIN 500 MG/1
500 TABLET, FILM COATED ORAL EVERY OTHER DAY
Qty: 2 TABLET | Refills: 0 | Status: SHIPPED | OUTPATIENT
Start: 2023-11-22 | End: 2023-11-25

## 2023-11-20 RX ADMIN — LEVOFLOXACIN 500 MG: 500 TABLET, FILM COATED ORAL at 11:54

## 2023-11-20 RX ADMIN — ACETAMINOPHEN 650 MG: 325 TABLET ORAL at 08:11

## 2023-11-20 RX ADMIN — PIPERACILLIN AND TAZOBACTAM 3375 MG: 3; .375 INJECTION, POWDER, LYOPHILIZED, FOR SOLUTION INTRAVENOUS at 08:15

## 2023-11-20 RX ADMIN — HEPARIN SODIUM 5000 UNITS: 5000 INJECTION INTRAVENOUS; SUBCUTANEOUS at 08:12

## 2023-11-20 ASSESSMENT — PAIN SCALES - GENERAL
PAINLEVEL_OUTOF10: 3
PAINLEVEL_OUTOF10: 0

## 2023-11-20 ASSESSMENT — PAIN DESCRIPTION - ORIENTATION: ORIENTATION: RIGHT;LEFT;MID;LOWER

## 2023-11-20 ASSESSMENT — PAIN DESCRIPTION - LOCATION: LOCATION: BACK

## 2023-11-20 ASSESSMENT — PAIN - FUNCTIONAL ASSESSMENT: PAIN_FUNCTIONAL_ASSESSMENT: ACTIVITIES ARE NOT PREVENTED

## 2023-11-20 ASSESSMENT — PAIN DESCRIPTION - DESCRIPTORS: DESCRIPTORS: DISCOMFORT

## 2023-11-20 NOTE — PLAN OF CARE
Problem: Discharge Planning  Goal: Discharge to home or other facility with appropriate resources  11/16/2023 2031 by Adama Flowers RN  Flowsheets (Taken 11/16/2023 2031)  Discharge to home or other facility with appropriate resources:   Identify barriers to discharge with patient and caregiver   Arrange for needed discharge resources and transportation as appropriate   Identify discharge learning needs (meds, wound care, etc)   Arrange for interpreters to assist at discharge as needed   Refer to discharge planning if patient needs post-hospital services based on physician order or complex needs related to functional status, cognitive ability or social support system  11/16/2023 0641 by Jaylene Lopez RN  Flowsheets (Taken 11/15/2023 1951)  Discharge to home or other facility with appropriate resources: Identify barriers to discharge with patient and caregiver     Problem: Pain  Goal: Verbalizes/displays adequate comfort level or baseline comfort level  Recent Flowsheet Documentation  Taken 11/16/2023 1925 by Jaylene Lopez RN  Verbalizes/displays adequate comfort level or baseline comfort level:   Encourage patient to monitor pain and request assistance   Assess pain using appropriate pain scale  11/16/2023 0641 by Jaylene Lopez RN  Flowsheets  Taken 11/16/2023 0563  Verbalizes/displays adequate comfort level or baseline comfort level:   Assess pain using appropriate pain scale   Encourage patient to monitor pain and request assistance   Administer analgesics based on type and severity of pain and evaluate response   Implement non-pharmacological measures as appropriate and evaluate response  Taken 11/15/2023 1919  Verbalizes/displays adequate comfort level or baseline comfort level:   Encourage patient to monitor pain and request assistance   Assess pain using appropriate pain scale     Problem: Safety - Adult  Goal: Free from fall injury  11/16/2023 0641 by Jaylene Lopez RN  Flowsheets (Taken 11/15/2023 1711 by Jocelyn Smith
Problem: Discharge Planning  Goal: Discharge to home or other facility with appropriate resources  11/20/2023 1003 by Jose Guadalupe Salvador RN  Outcome: Progressing  11/20/2023 0257 by Lucia Medina RN  Outcome: Progressing     Problem: Pain  Goal: Verbalizes/displays adequate comfort level or baseline comfort level  11/20/2023 1003 by Jose Guadalupe Salvador RN  Outcome: Progressing  11/20/2023 0257 by Lucia Medina RN  Outcome: Progressing     Problem: Safety - Adult  Goal: Free from fall injury  11/20/2023 1003 by Jose Guadalupe Salvador RN  Outcome: Progressing  11/20/2023 0257 by Lucia Medina RN  Outcome: Progressing     Problem: ABCDS Injury Assessment  Goal: Absence of physical injury  11/20/2023 1003 by Jose Guadalupe Salvador RN  Outcome: Progressing  11/20/2023 0257 by Lucia Medina RN  Outcome: Progressing     Problem: Safety - Medical Restraint  Goal: Remains free of injury from restraints (Restraint for Interference with Medical Device)  Description: INTERVENTIONS:  1. Determine that other, less restrictive measures have been tried or would not be effective before applying the restraint  2. Evaluate the patient's condition at the time of restraint application  3. Inform patient/family regarding the reason for restraint  4. Q2H: Monitor safety, psychosocial status, comfort, nutrition and hydration  11/20/2023 1003 by Jose Guadalupe Salvador RN  Outcome: Progressing  11/20/2023 0257 by Lucia Medina RN  Outcome: Progressing     Problem: Skin/Tissue Integrity  Goal: Absence of new skin breakdown  Description: 1. Monitor for areas of redness and/or skin breakdown  2. Assess vascular access sites hourly  3. Every 4-6 hours minimum:  Change oxygen saturation probe site  4. Every 4-6 hours:  If on nasal continuous positive airway pressure, respiratory therapy assess nares and determine need for appliance change or resting period.   11/20/2023 1003 by Jose Guadalupe Salvador RN  Outcome: Progressing  11/20/2023 0257 by Lucia Medina
Problem: Discharge Planning  Goal: Discharge to home or other facility with appropriate resources  11/20/2023 1243 by Leigh Ann Hagen RN  Outcome: Adequate for Discharge  11/20/2023 1003 by Leigh Ann Hagen RN  Outcome: Progressing  11/20/2023 0257 by Sierra Helton RN  Outcome: Progressing     Problem: Pain  Goal: Verbalizes/displays adequate comfort level or baseline comfort level  11/20/2023 1243 by Leigh Ann Hagen RN  Outcome: Adequate for Discharge  11/20/2023 1003 by Leigh Ann Hagen RN  Outcome: Progressing  11/20/2023 0257 by Sierra Helton RN  Outcome: Progressing     Problem: Safety - Adult  Goal: Free from fall injury  11/20/2023 1243 by Leigh Ann Hagen RN  Outcome: Adequate for Discharge  11/20/2023 1003 by Leigh Ann Hagen RN  Outcome: Progressing  11/20/2023 0257 by Sierra Helton RN  Outcome: Progressing     Problem: ABCDS Injury Assessment  Goal: Absence of physical injury  11/20/2023 1243 by Leigh Ann Hagen RN  Outcome: Adequate for Discharge  11/20/2023 1003 by Leigh Ann Hagen RN  Outcome: Progressing  11/20/2023 0257 by Sierra Helton RN  Outcome: Progressing     Problem: Safety - Medical Restraint  Goal: Remains free of injury from restraints (Restraint for Interference with Medical Device)  Description: INTERVENTIONS:  1. Determine that other, less restrictive measures have been tried or would not be effective before applying the restraint  2. Evaluate the patient's condition at the time of restraint application  3. Inform patient/family regarding the reason for restraint  4. Q2H: Monitor safety, psychosocial status, comfort, nutrition and hydration  11/20/2023 1243 by Leigh Ann Hagen RN  Outcome: Adequate for Discharge  11/20/2023 1003 by Leigh Ann Hagen RN  Outcome: Progressing  11/20/2023 0257 by Sierra Helton RN  Outcome: Progressing     Problem: Skin/Tissue Integrity  Goal: Absence of new skin breakdown  Description: 1. Monitor for areas of redness and/or skin breakdown  2.
Problem: Discharge Planning  Goal: Discharge to home or other facility with appropriate resources  Outcome: Progressing     Problem: Pain  Goal: Verbalizes/displays adequate comfort level or baseline comfort level  11/14/2023 1028 by Rocio Campos RN  Outcome: Progressing  11/14/2023 0109 by Ellyn Eid RN  Outcome: Progressing     Problem: Safety - Adult  Goal: Free from fall injury  Outcome: Progressing     Problem: ABCDS Injury Assessment  Goal: Absence of physical injury  Outcome: Progressing
Problem: Discharge Planning  Goal: Discharge to home or other facility with appropriate resources  Outcome: Progressing  Flowsheets (Taken 11/17/2023 1941 by Johnathan Arteaga RN)  Discharge to home or other facility with appropriate resources: Identify barriers to discharge with patient and caregiver     Problem: Pain  Goal: Verbalizes/displays adequate comfort level or baseline comfort level  Outcome: Progressing  Flowsheets (Taken 11/17/2023 1919 by Johnathan Arteaga RN)  Verbalizes/displays adequate comfort level or baseline comfort level:   Encourage patient to monitor pain and request assistance   Assess pain using appropriate pain scale   Administer analgesics based on type and severity of pain and evaluate response   Implement non-pharmacological measures as appropriate and evaluate response   Notify Licensed Independent Practitioner if interventions unsuccessful or patient reports new pain     Problem: Safety - Adult  Goal: Free from fall injury  Outcome: Progressing     Problem: ABCDS Injury Assessment  Goal: Absence of physical injury  Outcome: Progressing     Problem: Safety - Medical Restraint  Goal: Remains free of injury from restraints (Restraint for Interference with Medical Device)  Description: INTERVENTIONS:  1. Determine that other, less restrictive measures have been tried or would not be effective before applying the restraint  2. Evaluate the patient's condition at the time of restraint application  3. Inform patient/family regarding the reason for restraint  4. Q2H: Monitor safety, psychosocial status, comfort, nutrition and hydration  Outcome: Progressing     Problem: Skin/Tissue Integrity  Goal: Absence of new skin breakdown  Description: 1. Monitor for areas of redness and/or skin breakdown  2. Assess vascular access sites hourly  3. Every 4-6 hours minimum:  Change oxygen saturation probe site  4.   Every 4-6 hours:  If on nasal continuous positive airway pressure, respiratory therapy assess nares
Problem: Pain  Goal: Verbalizes/displays adequate comfort level or baseline comfort level  Outcome: Progressing
Problem: Safety - Adult  Goal: Free from fall injury  Outcome: Progressing     Problem: ABCDS Injury Assessment  Goal: Absence of physical injury  Outcome: Progressing     Problem: Skin/Tissue Integrity  Goal: Absence of new skin breakdown  Description: 1. Monitor for areas of redness and/or skin breakdown  2. Assess vascular access sites hourly  3. Every 4-6 hours minimum:  Change oxygen saturation probe site  4. Every 4-6 hours:  If on nasal continuous positive airway pressure, respiratory therapy assess nares and determine need for appliance change or resting period.   Outcome: Progressing
Problem: Safety - Adult  Goal: Free from fall injury  Outcome: Progressing     Problem: Skin/Tissue Integrity  Goal: Absence of new skin breakdown  Description: 1. Monitor for areas of redness and/or skin breakdown  2. Assess vascular access sites hourly  3. Every 4-6 hours minimum:  Change oxygen saturation probe site  4. Every 4-6 hours:  If on nasal continuous positive airway pressure, respiratory therapy assess nares and determine need for appliance change or resting period.   Outcome: Progressing     Problem: ABCDS Injury Assessment  Goal: Absence of physical injury  Outcome: Progressing
Problem: Safety - Medical Restraint  Goal: Remains free of injury from restraints (Restraint for Interference with Medical Device)  Description: INTERVENTIONS:  1. Determine that other, less restrictive measures have been tried or would not be effective before applying the restraint  2. Evaluate the patient's condition at the time of restraint application  3. Inform patient/family regarding the reason for restraint  4.  Q2H: Monitor safety, psychosocial status, comfort, nutrition and hydration  Outcome: Progressing  Flowsheets (Taken 11/14/2023 4391 by Jeremías Uribe RN)  Remains free of injury from restraints (restraint for interference with medical device):   Determine that other, less restrictive measures have been tried or would not be effective before applying the restraint   Evaluate the patient's condition at the time of restraint application   Inform patient/family regarding the reason for restraint   Every 2 hours: Monitor safety, psychosocial status, comfort, nutrition and hydration     Problem: Safety - Adult  Goal: Free from fall injury  Outcome: Progressing
RN  Flowsheets (Taken 11/15/2023 1711)  Free From Fall Injury:   Instruct family/caregiver on patient safety   Based on caregiver fall risk screen, instruct family/caregiver to ask for assistance with transferring infant if caregiver noted to have fall risk factors     Problem: ABCDS Injury Assessment  Goal: Absence of physical injury  11/16/2023 0641 by Corry Jade RN  Flowsheets (Taken 11/15/2023 1711 by Selene Ruiz RN)  Absence of Physical Injury: Implement safety measures based on patient assessment  11/15/2023 1711 by Selene Ruiz RN  Flowsheets (Taken 11/15/2023 1711)  Absence of Physical Injury: Implement safety measures based on patient assessment
hydration     Patient alert and oriented to herself. VSS. Denies any pain. ATX, IV administered as ordered. LR 75 ml/hr is running into R anterior forearm. Neph tube is intact, small output, total 135 ml for the shift, brown, hazy appearance. Suprapubic cath intact, brown, hazy urine is draining, low output 150 ml total for the shift. Bladder scanned with 0 urine retention. Perfect serve sent. Soft restraint discontinued at 1428 today. Patient able to walk 185 feet in the peres with walker. This nurse talked with caregiver/daughter, gave update. Bed in lowest position. Bed alarm/brakes on. Call light within reach. All patient's needs met at this time.

## 2023-11-20 NOTE — CARE COORDINATION
Case Management Assessment            Discharge Note                    Date / Time of Note: 11/20/2023 12:01 PM                  Discharge Note Completed by: Aaron Rangel RN    Patient Name: Miguel Angel Smith   YOB: 1940  Diagnosis: Hydronephrosis [N13.30]  Hydronephrosis, unspecified hydronephrosis type [N13.30]  Urinary tract infection associated with cystostomy catheter, sequela [T83.510S, N39.0]   Date / Time: 11/13/2023 10:04 AM    Current PCP: Lowell Tarango MD  Clinic patient: No    Hospitalization in the last 30 days: No    Advance Directives:  Code Status: Full Code  [place] DNR form completed and on chart: Not Indicated    Financial:  Payor: Noe Sneed / Plan: Avelino Cross / Product Type: *No Product type* /      Pharmacy:    [facility] [address] [place] [address]  Phone: [phone] Fax: [phone]      Assistance purchasing medications?:    Assistance provided by Case Management: None at this time    Does patient want to participate in local refill/ meds to beds program?:      Meds To Beds General Rules:  1. Can ONLY be done Monday- Friday between 8:30am-5pm  2. Prescription(s) must be in pharmacy by 3pm to be filled same day  3. Copy of patient's insurance/ prescription drug card and patient face sheet must be sent along with the prescription(s)  4. Cost of Rx cannot be added to hospital bill. If financial assistance is needed, please contact unit  or ;  or  CANNOT provide pharmacy voucher for patients co-pays  5.  Patients can then  the prescription on their way out of the hospital at discharge, or pharmacy can deliver to the bedside if staff is available. (payment due at time of pick-up or delivery - cash, check, or card accepted)     Able to afford home medications/ co-pay costs: Yes    ADLS:  Current PT

## 2023-11-20 NOTE — CARE COORDINATION
CTN contacted [address] [place] [address] [phone]. They have accepted this patient and will pull referral from [facility].  They will contact patient and make arrangements for [facility] by 11/22  Electronically signed by Desmond Cuevas LPN on 13/02/5810 at 10:46 AM

## 2023-11-20 NOTE — CARE COORDINATION
11:55 AM  Spoke with son Yony Sheppard. He is agreeable to take patient  home today. States that he will pick patient up after work (before 6 pm.)  [facility] to follow for [facility]. See final dc note  Electronically signed by Flory Lopez RN, CM on 11/20/2023 at 12:01 PM.  Phone: [phone]  Fax: [phone]      11:48 AM  Sent to  when calling Son, Yony Sheppard. Left HIPAA compliant . LVM for daughter Kimi Ch.      Electronically signed by Flory Lopez RN, CM on 11/20/2023 at 11:50 AM.  Phone: [phone]  Fax: [phone]

## 2023-11-23 ENCOUNTER — APPOINTMENT (OUTPATIENT)
Dept: GENERAL RADIOLOGY | Age: 83
End: 2023-11-23
Payer: MEDICARE

## 2023-11-23 ENCOUNTER — HOSPITAL ENCOUNTER (EMERGENCY)
Age: 83
Discharge: HOME | End: 2023-11-23
Attending: EMERGENCY MEDICINE
Payer: MEDICARE

## 2023-11-23 VITALS
WEIGHT: 110.5 LBS | DIASTOLIC BLOOD PRESSURE: 96 MMHG | OXYGEN SATURATION: 100 % | RESPIRATION RATE: 16 BRPM | HEART RATE: 66 BPM | BODY MASS INDEX: 21.58 KG/M2 | SYSTOLIC BLOOD PRESSURE: 163 MMHG | TEMPERATURE: 97.5 F

## 2023-11-23 DIAGNOSIS — R41.82 ALTERED MENTAL STATUS, UNSPECIFIED ALTERED MENTAL STATUS TYPE: Primary | ICD-10-CM

## 2023-11-23 LAB
ALBUMIN SERPL-MCNC: 3.9 G/DL (ref 3.4–5)
ALBUMIN/GLOB SERPL: 1 {RATIO} (ref 1.1–2.2)
ALP SERPL-CCNC: 79 U/L (ref 40–129)
ALT SERPL-CCNC: 12 U/L (ref 10–40)
ANION GAP SERPL CALCULATED.3IONS-SCNC: 11 MMOL/L (ref 3–16)
AST SERPL-CCNC: 21 U/L (ref 15–37)
BASOPHILS # BLD: 0 K/UL (ref 0–0.2)
BASOPHILS NFR BLD: 0.6 %
BILIRUB SERPL-MCNC: <0.2 MG/DL (ref 0–1)
BILIRUB UR QL STRIP.AUTO: NEGATIVE
BILIRUB UR QL STRIP.AUTO: NEGATIVE
BUN SERPL-MCNC: 10 MG/DL (ref 7–20)
CALCIUM SERPL-MCNC: 9.1 MG/DL (ref 8.3–10.6)
CHLORIDE SERPL-SCNC: 104 MMOL/L (ref 99–110)
CLARITY UR: ABNORMAL
CLARITY UR: CLEAR
CO2 SERPL-SCNC: 21 MMOL/L (ref 21–32)
COLOR UR: YELLOW
COLOR UR: YELLOW
CREAT SERPL-MCNC: 1.4 MG/DL (ref 0.6–1.2)
DEPRECATED RDW RBC AUTO: 14.9 % (ref 12.4–15.4)
EKG ATRIAL RATE: 105 BPM
EKG DIAGNOSIS: NORMAL
EKG P AXIS: 63 DEGREES
EKG P-R INTERVAL: 150 MS
EKG Q-T INTERVAL: 322 MS
EKG QRS DURATION: 68 MS
EKG QTC CALCULATION (BAZETT): 425 MS
EKG R AXIS: 72 DEGREES
EKG T AXIS: 45 DEGREES
EKG VENTRICULAR RATE: 105 BPM
EOSINOPHIL # BLD: 0.6 K/UL (ref 0–0.6)
EOSINOPHIL NFR BLD: 7.7 %
GFR SERPLBLD CREATININE-BSD FMLA CKD-EPI: 37 ML/MIN/{1.73_M2}
GLUCOSE SERPL-MCNC: 176 MG/DL (ref 70–99)
GLUCOSE UR STRIP.AUTO-MCNC: NEGATIVE MG/DL
GLUCOSE UR STRIP.AUTO-MCNC: NEGATIVE MG/DL
HCT VFR BLD AUTO: 29.5 % (ref 36–48)
HGB BLD-MCNC: 9.6 G/DL (ref 12–16)
HGB UR QL STRIP.AUTO: ABNORMAL
HGB UR QL STRIP.AUTO: ABNORMAL
KETONES UR STRIP.AUTO-MCNC: NEGATIVE MG/DL
KETONES UR STRIP.AUTO-MCNC: NEGATIVE MG/DL
LEUKOCYTE ESTERASE UR QL STRIP.AUTO: ABNORMAL
LEUKOCYTE ESTERASE UR QL STRIP.AUTO: ABNORMAL
LYMPHOCYTES # BLD: 2.2 K/UL (ref 1–5.1)
LYMPHOCYTES NFR BLD: 30.1 %
MAGNESIUM SERPL-MCNC: 1.8 MG/DL (ref 1.8–2.4)
MCH RBC QN AUTO: 28.6 PG (ref 26–34)
MCHC RBC AUTO-ENTMCNC: 32.6 G/DL (ref 31–36)
MCV RBC AUTO: 87.8 FL (ref 80–100)
MONOCYTES # BLD: 0.5 K/UL (ref 0–1.3)
MONOCYTES NFR BLD: 6.3 %
NEUTROPHILS # BLD: 4.1 K/UL (ref 1.7–7.7)
NEUTROPHILS NFR BLD: 55.3 %
NITRITE UR QL STRIP.AUTO: NEGATIVE
NITRITE UR QL STRIP.AUTO: NEGATIVE
PH UR STRIP.AUTO: 6 [PH] (ref 5–8)
PH UR STRIP.AUTO: 6.5 [PH] (ref 5–8)
PLATELET # BLD AUTO: 354 K/UL (ref 135–450)
PMV BLD AUTO: 6.8 FL (ref 5–10.5)
POTASSIUM SERPL-SCNC: 3.4 MMOL/L (ref 3.5–5.1)
PROT SERPL-MCNC: 8 G/DL (ref 6.4–8.2)
PROT UR STRIP.AUTO-MCNC: 100 MG/DL
PROT UR STRIP.AUTO-MCNC: 100 MG/DL
RBC # BLD AUTO: 3.36 M/UL (ref 4–5.2)
RBC #/AREA URNS HPF: ABNORMAL /HPF (ref 0–4)
RBC #/AREA URNS HPF: ABNORMAL /HPF (ref 0–4)
SODIUM SERPL-SCNC: 136 MMOL/L (ref 136–145)
SP GR UR STRIP.AUTO: 1.02 (ref 1–1.03)
SP GR UR STRIP.AUTO: >=1.03 (ref 1–1.03)
UA COMPLETE W REFLEX CULTURE PNL UR: YES
UA DIPSTICK W REFLEX MICRO PNL UR: YES
UA DIPSTICK W REFLEX MICRO PNL UR: YES
URN SPEC COLLECT METH UR: ABNORMAL
URN SPEC COLLECT METH UR: ABNORMAL
UROBILINOGEN UR STRIP-ACNC: 0.2 E.U./DL
UROBILINOGEN UR STRIP-ACNC: 0.2 E.U./DL
WBC # BLD AUTO: 7.4 K/UL (ref 4–11)
WBC #/AREA URNS HPF: ABNORMAL /HPF (ref 0–5)
WBC #/AREA URNS HPF: ABNORMAL /HPF (ref 0–5)
YEAST URNS QL MICRO: PRESENT /HPF

## 2023-11-23 PROCEDURE — 81001 URINALYSIS AUTO W/SCOPE: CPT

## 2023-11-23 PROCEDURE — 71045 X-RAY EXAM CHEST 1 VIEW: CPT

## 2023-11-23 PROCEDURE — 93005 ELECTROCARDIOGRAM TRACING: CPT | Performed by: STUDENT IN AN ORGANIZED HEALTH CARE EDUCATION/TRAINING PROGRAM

## 2023-11-23 PROCEDURE — 99285 EMERGENCY DEPT VISIT HI MDM: CPT

## 2023-11-23 PROCEDURE — 84443 ASSAY THYROID STIM HORMONE: CPT

## 2023-11-23 PROCEDURE — 87086 URINE CULTURE/COLONY COUNT: CPT

## 2023-11-23 PROCEDURE — 83735 ASSAY OF MAGNESIUM: CPT

## 2023-11-23 PROCEDURE — 36415 COLL VENOUS BLD VENIPUNCTURE: CPT

## 2023-11-23 PROCEDURE — 85025 COMPLETE CBC W/AUTO DIFF WBC: CPT

## 2023-11-23 PROCEDURE — 80053 COMPREHEN METABOLIC PANEL: CPT

## 2023-11-23 PROCEDURE — 6370000000 HC RX 637 (ALT 250 FOR IP): Performed by: STUDENT IN AN ORGANIZED HEALTH CARE EDUCATION/TRAINING PROGRAM

## 2023-11-23 RX ORDER — MECOBALAMIN 5000 MCG
5 TABLET,DISINTEGRATING ORAL ONCE
Status: COMPLETED | OUTPATIENT
Start: 2023-11-23 | End: 2023-11-23

## 2023-11-23 RX ORDER — ACETAMINOPHEN 325 MG/1
650 TABLET ORAL ONCE
Status: COMPLETED | OUTPATIENT
Start: 2023-11-23 | End: 2023-11-23

## 2023-11-23 RX ORDER — LANOLIN ALCOHOL/MO/W.PET/CERES
3 CREAM (GRAM) TOPICAL DAILY
Qty: 30 TABLET | Refills: 0 | Status: SHIPPED | OUTPATIENT
Start: 2023-11-23

## 2023-11-23 RX ORDER — SODIUM CHLORIDE, SODIUM LACTATE, POTASSIUM CHLORIDE, AND CALCIUM CHLORIDE .6; .31; .03; .02 G/100ML; G/100ML; G/100ML; G/100ML
1000 INJECTION, SOLUTION INTRAVENOUS ONCE
Status: DISCONTINUED | OUTPATIENT
Start: 2023-11-23 | End: 2023-11-24 | Stop reason: HOSPADM

## 2023-11-23 RX ADMIN — ACETAMINOPHEN 650 MG: 325 TABLET ORAL at 21:44

## 2023-11-23 RX ADMIN — Medication 5 MG: at 21:49

## 2023-11-24 LAB — TSH SERPL DL<=0.005 MIU/L-ACNC: 1.55 UIU/ML (ref 0.27–4.2)
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
## 2023-11-24 NOTE — DISCHARGE INSTRUCTIONS
You were seen in the hospital for:  1. Altered mental status, unspecified altered mental status type      Your evaluation found:  -Your urine test from both sources showed that you are effectively treating urinary tract infection  -Screening labs did not show any other concerning abnormality or laboratory issue which could account for the episode today    You were treated with:  -Tylenol  -Melatonin    Specific instructions for discharge:  -None    Results to be followed up on:  -None    Follow up with:  -Your primary care physician as scheduled for recheck of symptoms    You should return to the emergency department if your symptoms worsen or do not resolve.  In addition, return if:  -You have a fever (greater than 101 degrees)  -You have chest pain, shortness of breath, abdominal pain, or uncontrollable vomiting  -You are unable to eat or drink  -You pass out  -You have difficulty moving your arms or legs   -You have difficulty speaking or slurred speech  -Or you have any concern that you feel needs acute physician evaluation

## 2023-11-25 LAB — BACTERIA UR CULT: NORMAL

## 2023-12-01 NOTE — PROGRESS NOTES
Called patient about procedure. Spoke with patient's son. Told to be here at 0730 for procedure at 0900. Must be NPO after midnight but can take morning medication with sips of water. Patient's son stated she does not take a blood thinner. Told to have a responsible adult with them to take them home and stay with them afterwards, if they do not get admitted to hospital. Also, to bring a current list of medications. No other questions or concerns.

## 2023-12-04 ENCOUNTER — HOSPITAL ENCOUNTER (OUTPATIENT)
Dept: INTERVENTIONAL RADIOLOGY/VASCULAR | Age: 83
Discharge: HOME | End: 2023-12-04
Payer: MEDICARE

## 2023-12-04 VITALS — HEART RATE: 70 BPM | SYSTOLIC BLOOD PRESSURE: 152 MMHG | RESPIRATION RATE: 14 BRPM | DIASTOLIC BLOOD PRESSURE: 74 MMHG

## 2023-12-04 DIAGNOSIS — N13.39 OTHER HYDRONEPHROSIS: ICD-10-CM

## 2023-12-04 PROCEDURE — 2500000003 HC RX 250 WO HCPCS

## 2023-12-04 PROCEDURE — 99153 MOD SED SAME PHYS/QHP EA: CPT

## 2023-12-04 PROCEDURE — C1894 INTRO/SHEATH, NON-LASER: HCPCS

## 2023-12-04 PROCEDURE — 6360000004 HC RX CONTRAST MEDICATION: Performed by: RADIOLOGY

## 2023-12-04 PROCEDURE — 99152 MOD SED SAME PHYS/QHP 5/>YRS: CPT

## 2023-12-04 PROCEDURE — 6360000002 HC RX W HCPCS

## 2023-12-04 PROCEDURE — C1769 GUIDE WIRE: HCPCS

## 2023-12-04 PROCEDURE — 50435 EXCHANGE NEPHROSTOMY CATH: CPT

## 2023-12-04 PROCEDURE — 2580000003 HC RX 258

## 2023-12-04 RX ADMIN — IOHEXOL 50 ML: 350 INJECTION, SOLUTION INTRAVENOUS at 10:02

## 2023-12-04 NOTE — H&P
tablet Take 1 tablet by mouth daily      Calcium Carbonate-Vitamin D (CALCIUM + D PO) Take by mouth      Ascorbic Acid (VITAMIN C) 500 MG tablet Take 1 tablet by mouth daily      Multiple Vitamins-Minerals (THERAPEUTIC MULTIVITAMIN-MINERALS) tablet Take 1 tablet by mouth daily       No current facility-administered medications on file prior to encounter. Current Meds  No current facility-administered medications for this encounter.         ASA 2 - Patient with mild systemic disease with no functional limitations    II (soft palate, uvula, fauces visible)    Activity:  2 - Able to move 4 extremities voluntarily on command  Respiration:  2 - Able to breathe deeply and cough freely  Circulation:  2 - BP+/- 20mmHg of normal  Consciousness:  2 - Fully awake  Oxygen Saturation (color):  2 - Able to maintain oxygen saturation >92% on room air    Sedation : Moderate sedation planned

## 2023-12-04 NOTE — DISCHARGE INSTRUCTIONS
beyond 12-24 hours, notify your doctor. _x___ Resume your previous diet. ACTIVITY INSTRUCTIONS:    ____ No Special instructions  ____ Rest for 24 hours    ____ Up as tolerated  ____ Increase activity as tolerated        Wound/Dressing Instructions:    ____ May shower, tomorrow. ____ Remove bandage within 24 hours. Call: _________________________________     FOLLOW-UP APPOINTMENT    Follow up with MD as directed. Belongings returned to patient and/or family: Yes. The Discharge Instructions have been explained to me. I understand and can verbalize these instructions.

## 2023-12-04 NOTE — PROCEDURES
IR Brief Postoperative Note    Ileene Alto  YOB: 1940  [id number]    Pre-operative Diagnosis: urinary obstruction    Post-operative Diagnosis: Same    Procedure: L pcn exchange, unable to cross stricture    Anesthesia: mod    Surgeons/Assistants: shellie    Estimated Blood Loss: Minimal    Complications: none    Specimens: were not obtained    See full procedure dictation to follow      Ari Castro MD MD  12/4/2023

## 2023-12-10 ENCOUNTER — HOSPITAL ENCOUNTER (EMERGENCY)
Age: 83
Discharge: HOME | End: 2023-12-10
Attending: EMERGENCY MEDICINE
Payer: MEDICARE

## 2023-12-10 ENCOUNTER — APPOINTMENT (OUTPATIENT)
Dept: GENERAL RADIOLOGY | Age: 83
End: 2023-12-10
Payer: MEDICARE

## 2023-12-10 VITALS
RESPIRATION RATE: 18 BRPM | TEMPERATURE: 97.5 F | OXYGEN SATURATION: 97 % | HEART RATE: 105 BPM | BODY MASS INDEX: 21.69 KG/M2 | WEIGHT: 110.5 LBS | HEIGHT: 60 IN | SYSTOLIC BLOOD PRESSURE: 164 MMHG | DIASTOLIC BLOOD PRESSURE: 96 MMHG

## 2023-12-10 DIAGNOSIS — R45.1 AGITATION: Primary | ICD-10-CM

## 2023-12-10 LAB
ALBUMIN SERPL-MCNC: 4 G/DL (ref 3.4–5)
ALBUMIN/GLOB SERPL: 1.3 {RATIO} (ref 1.1–2.2)
ALP SERPL-CCNC: 82 U/L (ref 40–129)
ALT SERPL-CCNC: 9 U/L (ref 10–40)
ANION GAP SERPL CALCULATED.3IONS-SCNC: 12 MMOL/L (ref 3–16)
AST SERPL-CCNC: 17 U/L (ref 15–37)
BACTERIA URNS QL MICRO: ABNORMAL /HPF
BASOPHILS # BLD: 0 K/UL (ref 0–0.2)
BASOPHILS NFR BLD: 0.8 %
BILIRUB SERPL-MCNC: <0.2 MG/DL (ref 0–1)
BILIRUB UR QL STRIP.AUTO: NEGATIVE
BUN SERPL-MCNC: 21 MG/DL (ref 7–20)
CALCIUM SERPL-MCNC: 9 MG/DL (ref 8.3–10.6)
CHLORIDE SERPL-SCNC: 107 MMOL/L (ref 99–110)
CLARITY UR: CLEAR
CO2 SERPL-SCNC: 23 MMOL/L (ref 21–32)
COLOR UR: YELLOW
CREAT SERPL-MCNC: 1.4 MG/DL (ref 0.6–1.2)
DEPRECATED RDW RBC AUTO: 15.3 % (ref 12.4–15.4)
EKG ATRIAL RATE: 94 BPM
EKG DIAGNOSIS: NORMAL
EKG P AXIS: 66 DEGREES
EKG P-R INTERVAL: 148 MS
EKG Q-T INTERVAL: 360 MS
EKG QRS DURATION: 72 MS
EKG QTC CALCULATION (BAZETT): 450 MS
EKG R AXIS: 70 DEGREES
EKG T AXIS: 51 DEGREES
EKG VENTRICULAR RATE: 94 BPM
EOSINOPHIL # BLD: 0.4 K/UL (ref 0–0.6)
EOSINOPHIL NFR BLD: 6.6 %
EPI CELLS #/AREA URNS HPF: ABNORMAL /HPF (ref 0–5)
FLUAV RNA RESP QL NAA+PROBE: NOT DETECTED
FLUBV RNA RESP QL NAA+PROBE: NOT DETECTED
GFR SERPLBLD CREATININE-BSD FMLA CKD-EPI: 37 ML/MIN/{1.73_M2}
GLUCOSE SERPL-MCNC: 157 MG/DL (ref 70–99)
GLUCOSE UR STRIP.AUTO-MCNC: NEGATIVE MG/DL
HCT VFR BLD AUTO: 29.3 % (ref 36–48)
HGB BLD-MCNC: 9.7 G/DL (ref 12–16)
HGB UR QL STRIP.AUTO: ABNORMAL
KETONES UR STRIP.AUTO-MCNC: NEGATIVE MG/DL
LEUKOCYTE ESTERASE UR QL STRIP.AUTO: ABNORMAL
LYMPHOCYTES # BLD: 2.2 K/UL (ref 1–5.1)
LYMPHOCYTES NFR BLD: 34.3 %
MAGNESIUM SERPL-MCNC: 2.1 MG/DL (ref 1.8–2.4)
MCH RBC QN AUTO: 29.3 PG (ref 26–34)
MCHC RBC AUTO-ENTMCNC: 33.2 G/DL (ref 31–36)
MCV RBC AUTO: 88.3 FL (ref 80–100)
MONOCYTES # BLD: 0.5 K/UL (ref 0–1.3)
MONOCYTES NFR BLD: 8 %
NEUTROPHILS # BLD: 3.2 K/UL (ref 1.7–7.7)
NEUTROPHILS NFR BLD: 50.3 %
NITRITE UR QL STRIP.AUTO: NEGATIVE
PH UR STRIP.AUTO: 6.5 [PH] (ref 5–8)
PLATELET # BLD AUTO: 220 K/UL (ref 135–450)
PMV BLD AUTO: 7 FL (ref 5–10.5)
POTASSIUM SERPL-SCNC: 3.5 MMOL/L (ref 3.5–5.1)
PROT SERPL-MCNC: 7.2 G/DL (ref 6.4–8.2)
PROT UR STRIP.AUTO-MCNC: ABNORMAL MG/DL
RBC # BLD AUTO: 3.32 M/UL (ref 4–5.2)
RBC #/AREA URNS HPF: ABNORMAL /HPF (ref 0–4)
SARS-COV-2 RNA RESP QL NAA+PROBE: NOT DETECTED
SODIUM SERPL-SCNC: 142 MMOL/L (ref 136–145)
SP GR UR STRIP.AUTO: 1.02 (ref 1–1.03)
UA COMPLETE W REFLEX CULTURE PNL UR: YES
UA DIPSTICK W REFLEX MICRO PNL UR: YES
URN SPEC COLLECT METH UR: ABNORMAL
UROBILINOGEN UR STRIP-ACNC: 0.2 E.U./DL
WBC # BLD AUTO: 6.5 K/UL (ref 4–11)
WBC #/AREA URNS HPF: ABNORMAL /HPF (ref 0–5)
YEAST URNS QL MICRO: PRESENT /HPF

## 2023-12-10 PROCEDURE — 93005 ELECTROCARDIOGRAM TRACING: CPT

## 2023-12-10 PROCEDURE — 71045 X-RAY EXAM CHEST 1 VIEW: CPT

## 2023-12-10 PROCEDURE — 80053 COMPREHEN METABOLIC PANEL: CPT

## 2023-12-10 PROCEDURE — 81001 URINALYSIS AUTO W/SCOPE: CPT

## 2023-12-10 PROCEDURE — 36415 COLL VENOUS BLD VENIPUNCTURE: CPT

## 2023-12-10 PROCEDURE — 87086 URINE CULTURE/COLONY COUNT: CPT

## 2023-12-10 PROCEDURE — 6370000000 HC RX 637 (ALT 250 FOR IP): Performed by: EMERGENCY MEDICINE

## 2023-12-10 PROCEDURE — 99285 EMERGENCY DEPT VISIT HI MDM: CPT

## 2023-12-10 PROCEDURE — 87636 SARSCOV2 & INF A&B AMP PRB: CPT

## 2023-12-10 PROCEDURE — 85025 COMPLETE CBC W/AUTO DIFF WBC: CPT

## 2023-12-10 PROCEDURE — 83735 ASSAY OF MAGNESIUM: CPT

## 2023-12-10 PROCEDURE — 87106 FUNGI IDENTIFICATION YEAST: CPT

## 2023-12-10 RX ORDER — OLANZAPINE 5 MG/1
5 TABLET, ORALLY DISINTEGRATING ORAL ONCE
Status: COMPLETED | OUTPATIENT
Start: 2023-12-10 | End: 2023-12-10

## 2023-12-10 RX ADMIN — OLANZAPINE 5 MG: 5 TABLET, ORALLY DISINTEGRATING ORAL at 04:15

## 2023-12-10 ASSESSMENT — PAIN - FUNCTIONAL ASSESSMENT: PAIN_FUNCTIONAL_ASSESSMENT: NONE - DENIES PAIN

## 2023-12-10 ASSESSMENT — ENCOUNTER SYMPTOMS
ABDOMINAL PAIN: 0
COUGH: 0
VOMITING: 0
SHORTNESS OF BREATH: 0
NAUSEA: 0

## 2023-12-10 NOTE — DISCHARGE INSTRUCTIONS
You are seen in the emergency department today for confusion and agitation. Your exam and labs are reassuring. Your urine does not show obvious signs of infection. We have sent the urine sample for culture and we will notify you if any bacteria grow out that need treatment with antibiotics. Return to the ER for worsening confusion, pain, fevers, inability to eat/drink, or any other concerns.

## 2023-12-10 NOTE — ED PROVIDER NOTES
ED Attending Attestation Note     Date of evaluation: 12/10/2023    This patient was seen by the resident. I have seen and examined the patient, agree with the workup, evaluation, management and diagnosis. The care plan has been discussed. I was present for any procedures performed in the resident's  note and have made edits to the note where appropriate. My assessment reveals 78 y.o. female with history of nephrolithiasis, hypertension, CKD, ureteral stenting, suspected dementia/cognitive decline presenting to the emergency department today for altered mental status. Here she is alert, in no distress, borderline tachycardic but nontoxic-appearing. She is pleasant, oriented to person place and time, slightly confused about the situation and as to why she is here. This is quite similar to previous baseline at multiple times I have met her in the emergency department before for various other complaints. Will discuss with her son with whom she lives. Patient signed out to me by the off going resident at 0300 pending results of the patient's laboratory testing. Overall they appear to be at her baseline, she does have some pyuria and yeast, though not significantly different than previous and patient has been shown to be generally colonized previously. Will send for culture. Discussed this extensively with patient's son by phone and all questions answered to his satisfaction. She is slightly anxious here in the ED and desires to return home but has not been agitated or uncooperative. Patient's son able to pick patient up here in the ED and she was discharged in stable condition with written and verbal instructions for which to return to the ED.        Jasbir Perry MD  12/10/23 [id number]

## 2023-12-11 LAB
BACTERIA UR CULT: ABNORMAL
ORGANISM: ABNORMAL

## 2023-12-15 ENCOUNTER — HOSPITAL ENCOUNTER (EMERGENCY)
Age: 83
Discharge: HOME | DRG: 689 | End: 2023-12-15
Attending: EMERGENCY MEDICINE
Payer: MEDICARE

## 2023-12-15 VITALS
HEART RATE: 81 BPM | WEIGHT: 106 LBS | DIASTOLIC BLOOD PRESSURE: 85 MMHG | BODY MASS INDEX: 20.7 KG/M2 | TEMPERATURE: 98.2 F | OXYGEN SATURATION: 100 % | SYSTOLIC BLOOD PRESSURE: 158 MMHG | RESPIRATION RATE: 17 BRPM

## 2023-12-15 DIAGNOSIS — N99.528 NEPHROSTOMY COMPLICATION (HCC): Primary | ICD-10-CM

## 2023-12-15 LAB
BACTERIA URNS QL MICRO: ABNORMAL /HPF
BILIRUB UR QL STRIP.AUTO: NEGATIVE
CLARITY UR: CLEAR
COLOR UR: YELLOW
EPI CELLS #/AREA URNS HPF: ABNORMAL /HPF (ref 0–5)
GLUCOSE UR STRIP.AUTO-MCNC: NEGATIVE MG/DL
HGB UR QL STRIP.AUTO: ABNORMAL
KETONES UR STRIP.AUTO-MCNC: NEGATIVE MG/DL
LEUKOCYTE ESTERASE UR QL STRIP.AUTO: ABNORMAL
NITRITE UR QL STRIP.AUTO: NEGATIVE
PH UR STRIP.AUTO: 6 [PH] (ref 5–8)
PROT UR STRIP.AUTO-MCNC: 30 MG/DL
RBC #/AREA URNS HPF: ABNORMAL /HPF (ref 0–4)
SP GR UR STRIP.AUTO: >=1.03 (ref 1–1.03)
UA COMPLETE W REFLEX CULTURE PNL UR: ABNORMAL
UA DIPSTICK W REFLEX MICRO PNL UR: YES
URN SPEC COLLECT METH UR: ABNORMAL
UROBILINOGEN UR STRIP-ACNC: 0.2 E.U./DL
WBC #/AREA URNS HPF: ABNORMAL /HPF (ref 0–5)
YEAST URNS QL MICRO: PRESENT /HPF

## 2023-12-15 PROCEDURE — 51702 INSERT TEMP BLADDER CATH: CPT

## 2023-12-15 PROCEDURE — 81001 URINALYSIS AUTO W/SCOPE: CPT

## 2023-12-15 PROCEDURE — 99283 EMERGENCY DEPT VISIT LOW MDM: CPT

## 2023-12-15 RX ORDER — CHOLECALCIFEROL (VITAMIN D3) 125 MCG
5 CAPSULE ORAL NIGHTLY PRN
Qty: 30 TABLET | Refills: 1 | Status: SHIPPED | OUTPATIENT
Start: 2023-12-15

## 2023-12-15 ASSESSMENT — PAIN - FUNCTIONAL ASSESSMENT: PAIN_FUNCTIONAL_ASSESSMENT: NONE - DENIES PAIN

## 2023-12-15 NOTE — DISCHARGE INSTRUCTIONS
You are seen in the emergency department for cutting your nephrostomy tube bag. You should avoid doing this in the future. Your urine does not seem to be infected at this time. No antibiotics are needed. A urine culture is in process and if infection does grow you will be called and given a prescription for antibiotics at that time, however there is no evidence of one currently.   Continue take your medications as prescribed

## 2023-12-15 NOTE — ED PROVIDER NOTES
ED Attending Attestation Note     Date of evaluation: 12/15/2023    This patient was seen by the resident. I have seen and examined the patient, agree with the workup, evaluation, management and diagnosis. The care plan has been discussed. Briefly, Shar Menchaca is a 78 y.o. female with a PMH inclusive of bladder Ca with indwelling martinez and nephrostomy tube who presents for evaluation after cutting tube in half. Notable exam findings include cut nephrostomy tube    Assessment/ Medical Decision Making:     Bag replaced. No complaints otherwise.  Will discharge back to home with family       Radha Lacey MD  12/15/23 [id number]

## 2023-12-15 NOTE — ED NOTES
Attempted to d/c and replace martinez catheter, pt currently has a suprapubic catheter, drainage bag emptied, will collect next urine sample. Md aware.       Clarissa Childers RN  12/15/23 [id number]

## 2023-12-15 NOTE — ED NOTES
Pt son nu contacted for discharge and on the way to hospital, discharge instructions reviewed with patient and son. Pt dressed and placed with rn at rn desk due to being confused and elopement risk.       Starr Rodriguez RN  12/15/23 5786       Starr Rodriguez RN  12/15/23 0580

## 2023-12-17 ENCOUNTER — HOSPITAL ENCOUNTER (INPATIENT)
Age: 83
LOS: 6 days | Discharge: HOME | DRG: 689 | End: 2023-12-23
Attending: EMERGENCY MEDICINE | Admitting: INTERNAL MEDICINE
Payer: MEDICARE

## 2023-12-17 DIAGNOSIS — R45.1 AGITATION: ICD-10-CM

## 2023-12-17 DIAGNOSIS — T83.098A MALFUNCTION OF NEPHROSTOMY TUBE (HCC): Primary | ICD-10-CM

## 2023-12-17 PROBLEM — F03.918: Status: RESOLVED | Noted: 2023-12-17 | Resolved: 2023-12-17

## 2023-12-17 PROBLEM — F05: Status: RESOLVED | Noted: 2023-12-17 | Resolved: 2023-12-17

## 2023-12-17 PROBLEM — N18.32 STAGE 3B CHRONIC KIDNEY DISEASE (HCC): Status: ACTIVE | Noted: 2023-12-17

## 2023-12-17 PROBLEM — F03.918: Status: ACTIVE | Noted: 2023-12-17

## 2023-12-17 PROBLEM — F05: Status: ACTIVE | Noted: 2023-12-17

## 2023-12-17 LAB
ANION GAP SERPL CALCULATED.3IONS-SCNC: 15 MMOL/L (ref 3–16)
BASOPHILS # BLD: 0 K/UL (ref 0–0.2)
BASOPHILS NFR BLD: 0.5 %
BUN SERPL-MCNC: 21 MG/DL (ref 7–20)
CALCIUM SERPL-MCNC: 9.2 MG/DL (ref 8.3–10.6)
CHLORIDE SERPL-SCNC: 108 MMOL/L (ref 99–110)
CO2 SERPL-SCNC: 18 MMOL/L (ref 21–32)
CREAT SERPL-MCNC: 1.7 MG/DL (ref 0.6–1.2)
DEPRECATED RDW RBC AUTO: 16.4 % (ref 12.4–15.4)
EOSINOPHIL # BLD: 0.4 K/UL (ref 0–0.6)
EOSINOPHIL NFR BLD: 4.9 %
GFR SERPLBLD CREATININE-BSD FMLA CKD-EPI: 29 ML/MIN/{1.73_M2}
GLUCOSE SERPL-MCNC: 192 MG/DL (ref 70–99)
HCT VFR BLD AUTO: 31.8 % (ref 36–48)
HGB BLD-MCNC: 10.5 G/DL (ref 12–16)
LYMPHOCYTES # BLD: 2.9 K/UL (ref 1–5.1)
LYMPHOCYTES NFR BLD: 39.1 %
MCH RBC QN AUTO: 29.3 PG (ref 26–34)
MCHC RBC AUTO-ENTMCNC: 33 G/DL (ref 31–36)
MCV RBC AUTO: 88.7 FL (ref 80–100)
MONOCYTES # BLD: 0.5 K/UL (ref 0–1.3)
MONOCYTES NFR BLD: 6.7 %
NEUTROPHILS # BLD: 3.6 K/UL (ref 1.7–7.7)
NEUTROPHILS NFR BLD: 48.8 %
PLATELET # BLD AUTO: 275 K/UL (ref 135–450)
PMV BLD AUTO: 7 FL (ref 5–10.5)
POTASSIUM SERPL-SCNC: 3.9 MMOL/L (ref 3.5–5.1)
POTASSIUM SERPL-SCNC: 5.8 MMOL/L (ref 3.5–5.1)
RBC # BLD AUTO: 3.58 M/UL (ref 4–5.2)
SODIUM SERPL-SCNC: 141 MMOL/L (ref 136–145)
WBC # BLD AUTO: 7.4 K/UL (ref 4–11)

## 2023-12-17 PROCEDURE — 2580000003 HC RX 258: Performed by: INTERNAL MEDICINE

## 2023-12-17 PROCEDURE — 0T25X0Z CHANGE DRAINAGE DEVICE IN KIDNEY, EXTERNAL APPROACH: ICD-10-PCS | Performed by: RADIOLOGY

## 2023-12-17 PROCEDURE — 6360000002 HC RX W HCPCS: Performed by: NURSE PRACTITIONER

## 2023-12-17 PROCEDURE — 80048 BASIC METABOLIC PNL TOTAL CA: CPT

## 2023-12-17 PROCEDURE — 84132 ASSAY OF SERUM POTASSIUM: CPT

## 2023-12-17 PROCEDURE — 0T2BX0Z CHANGE DRAINAGE DEVICE IN BLADDER, EXTERNAL APPROACH: ICD-10-PCS | Performed by: RADIOLOGY

## 2023-12-17 PROCEDURE — 6370000000 HC RX 637 (ALT 250 FOR IP): Performed by: EMERGENCY MEDICINE

## 2023-12-17 PROCEDURE — 1200000000 HC SEMI PRIVATE

## 2023-12-17 PROCEDURE — 6360000002 HC RX W HCPCS: Performed by: INTERNAL MEDICINE

## 2023-12-17 PROCEDURE — 2580000003 HC RX 258: Performed by: NURSE PRACTITIONER

## 2023-12-17 PROCEDURE — 99285 EMERGENCY DEPT VISIT HI MDM: CPT

## 2023-12-17 PROCEDURE — 85025 COMPLETE CBC W/AUTO DIFF WBC: CPT

## 2023-12-17 RX ORDER — ACETAMINOPHEN 650 MG/1
650 SUPPOSITORY RECTAL EVERY 6 HOURS PRN
Status: DISCONTINUED | OUTPATIENT
Start: 2023-12-17 | End: 2023-12-23 | Stop reason: HOSPADM

## 2023-12-17 RX ORDER — SODIUM CHLORIDE 9 MG/ML
INJECTION, SOLUTION INTRAVENOUS PRN
Status: DISCONTINUED | OUTPATIENT
Start: 2023-12-17 | End: 2023-12-23 | Stop reason: HOSPADM

## 2023-12-17 RX ORDER — HALOPERIDOL 5 MG/ML
INJECTION INTRAMUSCULAR
Status: DISPENSED
Start: 2023-12-17 | End: 2023-12-17

## 2023-12-17 RX ORDER — SODIUM CHLORIDE 0.9 % (FLUSH) 0.9 %
5-40 SYRINGE (ML) INJECTION EVERY 12 HOURS SCHEDULED
Status: DISCONTINUED | OUTPATIENT
Start: 2023-12-17 | End: 2023-12-23 | Stop reason: HOSPADM

## 2023-12-17 RX ORDER — SODIUM CHLORIDE 0.9 % (FLUSH) 0.9 %
5-40 SYRINGE (ML) INJECTION PRN
Status: DISCONTINUED | OUTPATIENT
Start: 2023-12-17 | End: 2023-12-23 | Stop reason: HOSPADM

## 2023-12-17 RX ORDER — ACETAMINOPHEN 325 MG/1
650 TABLET ORAL EVERY 6 HOURS PRN
Status: DISCONTINUED | OUTPATIENT
Start: 2023-12-17 | End: 2023-12-23 | Stop reason: HOSPADM

## 2023-12-17 RX ORDER — ONDANSETRON 4 MG/1
4 TABLET, ORALLY DISINTEGRATING ORAL EVERY 8 HOURS PRN
Status: DISCONTINUED | OUTPATIENT
Start: 2023-12-17 | End: 2023-12-23 | Stop reason: HOSPADM

## 2023-12-17 RX ORDER — ONDANSETRON 2 MG/ML
4 INJECTION INTRAMUSCULAR; INTRAVENOUS EVERY 6 HOURS PRN
Status: DISCONTINUED | OUTPATIENT
Start: 2023-12-17 | End: 2023-12-23 | Stop reason: HOSPADM

## 2023-12-17 RX ORDER — HEPARIN SODIUM 5000 [USP'U]/ML
5000 INJECTION, SOLUTION INTRAVENOUS; SUBCUTANEOUS 2 TIMES DAILY
Status: DISCONTINUED | OUTPATIENT
Start: 2023-12-17 | End: 2023-12-23 | Stop reason: HOSPADM

## 2023-12-17 RX ORDER — SODIUM CHLORIDE, SODIUM LACTATE, POTASSIUM CHLORIDE, CALCIUM CHLORIDE 600; 310; 30; 20 MG/100ML; MG/100ML; MG/100ML; MG/100ML
INJECTION, SOLUTION INTRAVENOUS CONTINUOUS
Status: ACTIVE | OUTPATIENT
Start: 2023-12-17 | End: 2023-12-19

## 2023-12-17 RX ORDER — OLANZAPINE 5 MG/1
5 TABLET, ORALLY DISINTEGRATING ORAL ONCE
Status: COMPLETED | OUTPATIENT
Start: 2023-12-17 | End: 2023-12-17

## 2023-12-17 RX ADMIN — OLANZAPINE 5 MG: 5 TABLET, ORALLY DISINTEGRATING ORAL at 04:26

## 2023-12-17 RX ADMIN — OLANZAPINE 5 MG: 10 INJECTION, POWDER, FOR SOLUTION INTRAMUSCULAR at 08:20

## 2023-12-17 RX ADMIN — SODIUM CHLORIDE, POTASSIUM CHLORIDE, SODIUM LACTATE AND CALCIUM CHLORIDE: 600; 310; 30; 20 INJECTION, SOLUTION INTRAVENOUS at 15:19

## 2023-12-17 RX ADMIN — SODIUM CHLORIDE, PRESERVATIVE FREE 10 ML: 5 INJECTION INTRAVENOUS at 15:20

## 2023-12-17 RX ADMIN — ZIPRASIDONE MESYLATE 10 MG: 20 INJECTION, POWDER, LYOPHILIZED, FOR SOLUTION INTRAMUSCULAR at 21:30

## 2023-12-17 ASSESSMENT — PAIN - FUNCTIONAL ASSESSMENT: PAIN_FUNCTIONAL_ASSESSMENT: 0-10

## 2023-12-17 ASSESSMENT — PAIN DESCRIPTION - LOCATION: LOCATION: BACK

## 2023-12-17 ASSESSMENT — PAIN SCALES - GENERAL
PAINLEVEL_OUTOF10: 0
PAINLEVEL_OUTOF10: 5
PAINLEVEL_OUTOF10: 0
PAINLEVEL_OUTOF10: 0

## 2023-12-17 NOTE — PLAN OF CARE
[facility] Hospitalist brief note  Consult received. Case reviewed with ER physician  68-year-old female with history of bladder cancer and probable dementia was brought into the emergency room confused with a missing nephrostomy tube. Full note to follow.     Steven Sepulveda MD    Thanks  Carlos Martinez MD

## 2023-12-17 NOTE — ED NOTES
Pt arrived with an open nephrostomy tube due to ripping the bag off.  RN's applied hematstat to the eposed tube and coband-ed it around her waist.     Bianca Fernandez, RN  12/17/23 5728

## 2023-12-17 NOTE — ED TRIAGE NOTES
Pt arrived via EMS agitated and yelling at paramedics and EMTs. Per EMS, pt was found wandering around outside knocking on doors. Pt family called 46 due to a ripped nephrostomy tube and concern of infection.

## 2023-12-17 NOTE — H&P
V2.0  History and Physical      Name:  Portia Waggoner /Age/Sex: 1940  (83 y.o. female)   MRN & CSN:  [id number] & [id number] Encounter Date/Time: 2023 6:28 AM EST   [facility] PCP: Shahriar Hamm MD       Hospital Day: 1    Assessment and Plan:   Portia Waggoner is a 83 y.o. 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    Hospital Problems             Last Modified POA    * (Principal) ABNER (acute kidney injury) (HCC) 2023 Yes    Hypertension 2023 Yes    Nephrolithiasis 2023 Yes    Stage 3b chronic kidney disease (HCC) 2023 Yes   Dementia with behavioral changes versus acute metabolic encephalopathy    Plan:  IV fluids  Trend creatinine and electrolytes  Urology consult plus minus IR for replacement of right nephrostomy tube  Check UA  Resume home regimen for chronic stable conditions    Disposition:   Current Living situation: Home with son  Expected Disposition: Home versus SNF  Estimated D/C: 2 to 3 days    Diet ADULT DIET; Regular; No Added Salt (3-4 gm)   DVT Prophylaxis [x] Lovenox, []  Heparin, [] SCDs, [] Ambulation,  [] Eliquis, [] Xarelto, [] Coumadin   Code Status Full Code   Surrogate Decision Maker/ POA Daughter     Personally reviewed Lab Studies and Imaging     Discussed management of the case with ED provider who recommended admission.    EKG interpreted personally and results n/a.    Imaging that was interpreted personally includes none and results n/a.    Drugs that require monitoring for toxicity include none and the method of monitoring was n/a.        History from:     patient, electronic medical record    History of Present Illness:     Chief Complaint: Damaged right nephrostomy  Portia Waggoner is  declined     Lack of Transportation (Non-Medical): Patient declined   Housing Stability: Unknown (11/14/2023)    Housing Stability Vital Sign     Unable to Pay for Housing in the Last Year: Patient refused     Number of Places Lived in the Last Year: 1     Unstable Housing in the Last Year: Patient refused       Medications:   Medications:    sodium chloride flush  5-40 mL IntraVENous 2 times per day    heparin (porcine)  5,000 Units SubCUTAneous BID      Infusions:    sodium chloride      lactated ringers IV soln       PRN Meds: sodium chloride flush, 5-40 mL, PRN  sodium chloride, , PRN  ondansetron, 4 mg, Q8H PRN   Or  ondansetron, 4 mg, Q6H PRN  acetaminophen, 650 mg, Q6H PRN   Or  acetaminophen, 650 mg, Q6H PRN        Labs      CBC:   Recent Labs     12/17/23 0428   WBC 7.4   HGB 10.5*        BMP:    Recent Labs     12/17/23 0428 12/17/23  0520     --    K 5.8* 3.9     --    CO2 18*  --    BUN 21*  --    CREATININE 1.7*  --    GLUCOSE 192*  --      Hepatic: No results for input(s): \"AST\", \"ALT\", \"ALB\", \"BILITOT\", \"ALKPHOS\" in the last 72 hours. Lipids:   Lab Results   Component Value Date/Time    CHOL 217 09/03/2022 10:21 AM    HDL 44 09/03/2022 10:21 AM    HDL 64 11/08/2011 11:09 AM    TRIG 109 09/03/2022 10:21 AM     Hemoglobin A1C:   Lab Results   Component Value Date/Time    LABA1C 6.7 08/02/2023 09:07 AM     TSH: No results found for: \"TSH\"  Troponin: No results found for: \"TROPONINT\"  Lactic Acid: No results for input(s): \"LACTA\" in the last 72 hours. BNP: No results for input(s): \"PROBNP\" in the last 72 hours.   UA:  Lab Results   Component Value Date/Time    NITRU Negative 12/15/2023 12:42 PM    COLORU Yellow 12/15/2023 12:42 PM    PHUR 6.0 12/15/2023 12:42 PM    WBCUA 6-9 12/15/2023 12:42 PM    RBCUA 0-2 12/15/2023 12:42 PM    MUCUS 2+ 11/13/2023 12:02 PM    YEAST Present 12/15/2023 12:42 PM    BACTERIA Rare 12/15/2023 12:42 PM    CLARITYU Clear 12/15/2023 12:42 PM    SPECGRAV >=1.030 12/15/2023 12:42 PM    LEUKOCYTESUR MODERATE 12/15/2023 12:42 PM    UROBILINOGEN 0.2 12/15/2023 12:42 PM    BILIRUBINUR Negative 12/15/2023 12:42 PM    BLOODU SMALL 12/15/2023 12:42 PM    GLUCOSEU Negative 12/15/2023 12:42 PM    KETUA Negative 12/15/2023 12:42 PM     Urine Cultures:   Lab Results   Component Value Date/Time    LABURIN >100,000 CFU/ml  No further workup   12/10/2023 10:33 AM     Blood Cultures:   Lab Results   Component Value Date/Time    BC No Growth after 4 days of incubation. 08/02/2023 09:07 AM     No results found for: \"BLOODCULT2\"  Organism:   Lab Results   Component Value Date/Time    ORG Candida lusitaniae 12/10/2023 10:33 AM       Imaging/Diagnostics Last 24 Hours   No results found.       Electronically signed by Zoila Bernardo MD on 12/17/2023 at 6:28 AM

## 2023-12-18 LAB
ALBUMIN SERPL-MCNC: 3.5 G/DL (ref 3.4–5)
ALBUMIN/GLOB SERPL: 1 {RATIO} (ref 1.1–2.2)
ALP SERPL-CCNC: 72 U/L (ref 40–129)
ALT SERPL-CCNC: 7 U/L (ref 10–40)
ANION GAP SERPL CALCULATED.3IONS-SCNC: 11 MMOL/L (ref 3–16)
AST SERPL-CCNC: 19 U/L (ref 15–37)
BASOPHILS # BLD: 0 K/UL (ref 0–0.2)
BASOPHILS NFR BLD: 0.6 %
BILIRUB SERPL-MCNC: 0.3 MG/DL (ref 0–1)
BUN SERPL-MCNC: 17 MG/DL (ref 7–20)
CALCIUM SERPL-MCNC: 9.2 MG/DL (ref 8.3–10.6)
CHLORIDE SERPL-SCNC: 109 MMOL/L (ref 99–110)
CO2 SERPL-SCNC: 21 MMOL/L (ref 21–32)
CREAT SERPL-MCNC: 1.5 MG/DL (ref 0.6–1.2)
DEPRECATED RDW RBC AUTO: 15.6 % (ref 12.4–15.4)
EOSINOPHIL # BLD: 0.4 K/UL (ref 0–0.6)
EOSINOPHIL NFR BLD: 5.9 %
GFR SERPLBLD CREATININE-BSD FMLA CKD-EPI: 34 ML/MIN/{1.73_M2}
GLUCOSE SERPL-MCNC: 98 MG/DL (ref 70–99)
HCT VFR BLD AUTO: 31.8 % (ref 36–48)
HGB BLD-MCNC: 10.5 G/DL (ref 12–16)
LYMPHOCYTES # BLD: 2.6 K/UL (ref 1–5.1)
LYMPHOCYTES NFR BLD: 37.7 %
MCH RBC QN AUTO: 29.1 PG (ref 26–34)
MCHC RBC AUTO-ENTMCNC: 33.1 G/DL (ref 31–36)
MCV RBC AUTO: 87.8 FL (ref 80–100)
MONOCYTES # BLD: 0.5 K/UL (ref 0–1.3)
MONOCYTES NFR BLD: 6.8 %
NEUTROPHILS # BLD: 3.4 K/UL (ref 1.7–7.7)
NEUTROPHILS NFR BLD: 49 %
PLATELET # BLD AUTO: 252 K/UL (ref 135–450)
PMV BLD AUTO: 7 FL (ref 5–10.5)
POTASSIUM SERPL-SCNC: 5 MMOL/L (ref 3.5–5.1)
PROT SERPL-MCNC: 6.9 G/DL (ref 6.4–8.2)
RBC # BLD AUTO: 3.63 M/UL (ref 4–5.2)
SODIUM SERPL-SCNC: 141 MMOL/L (ref 136–145)
WBC # BLD AUTO: 7 K/UL (ref 4–11)

## 2023-12-18 PROCEDURE — 2580000003 HC RX 258: Performed by: INTERNAL MEDICINE

## 2023-12-18 PROCEDURE — 36415 COLL VENOUS BLD VENIPUNCTURE: CPT

## 2023-12-18 PROCEDURE — 1200000000 HC SEMI PRIVATE

## 2023-12-18 PROCEDURE — 99222 1ST HOSP IP/OBS MODERATE 55: CPT | Performed by: NURSE PRACTITIONER

## 2023-12-18 PROCEDURE — 6360000002 HC RX W HCPCS: Performed by: INTERNAL MEDICINE

## 2023-12-18 PROCEDURE — 85025 COMPLETE CBC W/AUTO DIFF WBC: CPT

## 2023-12-18 PROCEDURE — 80053 COMPREHEN METABOLIC PANEL: CPT

## 2023-12-18 RX ORDER — ASCORBIC ACID 500 MG
500 TABLET ORAL DAILY
Status: DISCONTINUED | OUTPATIENT
Start: 2023-12-18 | End: 2023-12-23 | Stop reason: HOSPADM

## 2023-12-18 RX ORDER — M-VIT,TX,IRON,MINS/CALC/FOLIC 27MG-0.4MG
1 TABLET ORAL DAILY
Status: DISCONTINUED | OUTPATIENT
Start: 2023-12-18 | End: 2023-12-23 | Stop reason: HOSPADM

## 2023-12-18 RX ORDER — LANOLIN ALCOHOL/MO/W.PET/CERES
5 CREAM (GRAM) TOPICAL NIGHTLY PRN
Status: DISCONTINUED | OUTPATIENT
Start: 2023-12-18 | End: 2023-12-23 | Stop reason: HOSPADM

## 2023-12-18 RX ADMIN — SODIUM CHLORIDE, POTASSIUM CHLORIDE, SODIUM LACTATE AND CALCIUM CHLORIDE: 600; 310; 30; 20 INJECTION, SOLUTION INTRAVENOUS at 04:04

## 2023-12-18 RX ADMIN — WATER 5 MG: 1 INJECTION INTRAMUSCULAR; INTRAVENOUS; SUBCUTANEOUS at 22:18

## 2023-12-18 ASSESSMENT — PAIN SCALES - GENERAL: PAINLEVEL_OUTOF10: 0

## 2023-12-18 NOTE — CARE COORDINATION
Case Management Assessment  Initial Evaluation    Date/Time of Evaluation: 12/18/2023 3:17 PM  Assessment Completed by: Octaviano Gómez RN    If patient is discharged prior to next notation, then this note serves as note for discharge by case management. Patient Name: Gustavo Lea                   YOB: 1940  Diagnosis: Agitation [R45.1]  ABNER (acute kidney injury) ([address]) [N17.9]  Malfunction of nephrostomy tube [facility]) [T83.098A]                   Date / Time: 12/17/2023  4:05 AM    Patient Admission Status: Inpatient   Readmission Risk (Low < 19, Mod (19-27), High > 27): Readmission Risk Score: 21.1    Current PCP: Heidi Terrell MD  PCP verified by CM? Yes    Chart Reviewed: Yes      History Provided by: Child/Family  Patient Orientation: Person    Patient Cognition: Severely Impaired    Hospitalization in the last 30 days (Readmission):  Yes    If yes, Readmission Assessment in CM Navigator will be completed. Advance Directives:      Code Status: Full Code   Patient's Primary Decision Maker is: Legal Next of Kin      Discharge Planning:    Patient lives with: Family Members Type of Home: House  Primary Care Giver: Family  Patient Support Systems include: Children, Family Members   Current Financial resources: Medicare  Current community resources: None  Current services prior to admission: None            Current DME:              Type of Home Care services:  None    ADLS  Prior functional level: Independent in ADLs/IADLs  Current functional level: Assistance with the following:, Bathing, Dressing, Toileting, Cooking, Housework, Shopping, Mobility    PT AM-PAC:   /24  OT AM-PAC:   /24    Family can provide assistance at DC: Yes  Would you like Case Management to discuss the discharge plan with any other family members/significant others, and if so, who?  Yes (spoke with daughter Keeley Berry)  Plans to Return to Present Housing: Yes  Other Identified Issues/Barriers to RETURNING to current housing: Name:       The Patient and/or Patient Representative Agree with the Discharge Plan?  Yes    Smitha Ramachandran RN  Case Management Department  Ph: [phone] Fax: [phone]

## 2023-12-18 NOTE — PROGRESS NOTES
Pt's arrived to the floor from the ED agitated and yelling that she wanted to go back downstairs to ED and was insisted on not wanting to stay. MD messaged and orders for Zyprexa ordered and given with benefit. Right flank nephrostomy tube site remains clamped  and intact with hemastat (from ED) and Coband wrapped around abdomen. Pt on LR 75 ml/hr IVF per orders. Pt has been calm, compliant and resting on and off throughout shift.

## 2023-12-18 NOTE — PROGRESS NOTES
Request made to provider for recommendations to help soothe patient. Pt has been agitated since start of shift. Staff has been having difficulty redirecting patient. Pt pulling at neph tubes, IV, and has been attempting to leave floor.

## 2023-12-18 NOTE — PROGRESS NOTES
Occupational/Physical Therapy    Attempted OT/PT evals. Pt in bed on arrival, responsive but unwilling to sit up or participate in any therapeutic activity despite multiple attempts. \"I'm just tired. I need to get some sleep. I work hard. \" Will re-attempt another time.      Edward Homans, OTR/L, [address] [facility] [place], PT, DPT  [id number]

## 2023-12-18 NOTE — PROGRESS NOTES
One time dose of geodon administered as ordered by provider for patient agitation. Pt has been agitated since start of shift. Observed pulling at neph tube sites. And getting dressed to make attempts at leaving floor. PCA staying with pt for safety.

## 2023-12-18 NOTE — PLAN OF CARE
Problem: Discharge Planning  Goal: Discharge to home or other facility with appropriate resources  Outcome: Progressing     Problem: Pain  Goal: Verbalizes/displays adequate comfort level or baseline comfort level  Outcome: Progressing     Problem: Risk for Elopement  Goal: Patient will not exit the unit/facility without proper excort  Outcome: Progressing     Problem: Safety - Adult  Goal: Free from fall injury  Outcome: Progressing     Problem: ABCDS Injury Assessment  Goal: Absence of physical injury  Outcome: Progressing     Problem: Confusion  Goal: Confusion, delirium, dementia, or psychosis is improved or at baseline  Description: INTERVENTIONS:  1. Assess for possible contributors to thought disturbance, including medications, impaired vision or hearing, underlying metabolic abnormalities, dehydration, psychiatric diagnoses, and notify attending LIP  2. Badin high risk fall precautions, as indicated  3. Provide frequent short contacts to provide reality reorientation, refocusing and direction  4. Decrease environmental stimuli, including noise as appropriate  5. Monitor and intervene to maintain adequate nutrition, hydration, elimination, sleep and activity  6. If unable to ensure safety without constant attention obtain sitter and review sitter guidelines with assigned personnel  7.  Initiate Psychosocial CNS and Spiritual Care consult, as indicated  Outcome: Progressing

## 2023-12-18 NOTE — CONSULTS
[facility]  Palliative Medicine Consultation Note      Date Of Admission:12/17/2023  Date of consult: 12/18/23  Seen by [facility] in the past:  No    Recommendations:        Saw pt at the bedside. She has been confused and agitated. Called pt's son Jacques Cardenas, who pt lives with and introduced palliative care. He reports pt has not had issues of confusion or agitation until she had her nephrostomy tube placed. He reports that this is the third time she has cut it. He believes that she becomes confused because of infection. He reports that she has no history of dementia, and behaves normally the majority of the time. He believes pt's quality of life would be better without the nephrostomy tube, but would not want her kidneys to fail and need dialysis. He did endorse that his sister Rebekah Kothari, who is a nurse in [place], is pt's HCPOA. 1. Goals of Care/Advanced Care planning/Code status: Full code, continue with current management, pt's son hopeful she can return to baseline. He reports episodes of confusion only started with nephrostomy tube placement, he believes this is due to infection. He believes her quality of life would be better without the nephrostomy tube, but worries about her chronic kidney disease and the potential need for dialysis if one kidney dies. 2. Pain: pt denies  3. SOB: pt denies  4. Nephrostomy tube issue: pt reportedly cut her nephrostomy tube at home. This is the third time she has done this per son during an episode of confusion. He believes this is due to infection, not a psychiatric issue.    5. Disposition: Likely return home with son vs SNF when medically ready for discharge    Reason for Consult:         [x]  Goals of Care  []  Code Status Discussion/Advanced Care Planning   []  Psychosocial/Family Support  []  Symptom Management  []  Other (Specify)    Requesting Physician: Dr. Doyle Dumont:  Damaged right nephrostomy     History Obtained From:  family tablet, Take 1 tablet by mouth daily  Calcium Carbonate-Vitamin D (CALCIUM + D PO), Take by mouth  Ascorbic Acid (VITAMIN C) 500 MG tablet, Take 1 tablet by mouth daily  Multiple Vitamins-Minerals (THERAPEUTIC MULTIVITAMIN-MINERALS) tablet, Take 1 tablet by mouth daily    Allergies:  Patient has no known allergies. Social History:    TOBACCO: reports that she has quit smoking. Her smoking use included cigarettes. She has never used smokeless tobacco.  ETOH:   reports current alcohol use of about 1.0 standard drink of alcohol per week. Patient currently lives with family son    Review of Systems -   Review of Systems   Unable to perform ROS: Mental status change       Objective:          Physical Exam  Constitutional:       General: She is not in acute distress. Appearance: She is ill-appearing. Cardiovascular:      Rate and Rhythm: Normal rate and regular rhythm. Heart sounds: Normal heart sounds. Pulmonary:      Breath sounds: Normal breath sounds. Abdominal:      General: Bowel sounds are normal.      Palpations: Abdomen is soft. Musculoskeletal:      Right lower leg: No edema. Left lower leg: No edema. Skin:     General: Skin is warm and dry. Neurological:      Mental Status: She is disoriented. Palliative Performance Scale:  [] 60% Ambulation reduced; Significant disease; Can't do hobbies/housework; intake normal or reduced; occasional assist; LOC full/confusion  [] 50% Mainly sit/lie; Extensive disease; Can't do any work; Considerable assist; intake normal  Or reduced; LOC full/confusion  [] 40% Mainly in bed; Extensive disease; Mainly assist; intake normal or reduced; occasional assist; LOC full/confusion  [] 30% Bed Bound; Extensive disease; Total care; intake reduced; LOC full/confusion  [] 20% Bed Bound; Extensive disease; Total care; intake minimal; Drowsy/coma  [] 10% Bed Bound; Extensive disease;  Total care; Mouth care only; Drowsy/coma  [] 0% Death    PPS: greater

## 2023-12-19 ENCOUNTER — APPOINTMENT (OUTPATIENT)
Dept: INTERVENTIONAL RADIOLOGY/VASCULAR | Age: 83
DRG: 689 | End: 2023-12-19
Payer: MEDICARE

## 2023-12-19 LAB
ALBUMIN SERPL-MCNC: 3.6 G/DL (ref 3.4–5)
ANION GAP SERPL CALCULATED.3IONS-SCNC: 8 MMOL/L (ref 3–16)
BUN SERPL-MCNC: 14 MG/DL (ref 7–20)
CALCIUM SERPL-MCNC: 9.3 MG/DL (ref 8.3–10.6)
CHLORIDE SERPL-SCNC: 106 MMOL/L (ref 99–110)
CO2 SERPL-SCNC: 26 MMOL/L (ref 21–32)
CREAT SERPL-MCNC: 1.4 MG/DL (ref 0.6–1.2)
DEPRECATED RDW RBC AUTO: 15.4 % (ref 12.4–15.4)
GFR SERPLBLD CREATININE-BSD FMLA CKD-EPI: 37 ML/MIN/{1.73_M2}
GLUCOSE SERPL-MCNC: 90 MG/DL (ref 70–99)
HCT VFR BLD AUTO: 30.4 % (ref 36–48)
HGB BLD-MCNC: 10.1 G/DL (ref 12–16)
MCH RBC QN AUTO: 28.8 PG (ref 26–34)
MCHC RBC AUTO-ENTMCNC: 33.4 G/DL (ref 31–36)
MCV RBC AUTO: 86.2 FL (ref 80–100)
PHOSPHATE SERPL-MCNC: 3.3 MG/DL (ref 2.5–4.9)
PLATELET # BLD AUTO: 251 K/UL (ref 135–450)
PMV BLD AUTO: 7 FL (ref 5–10.5)
POTASSIUM SERPL-SCNC: 4.2 MMOL/L (ref 3.5–5.1)
RBC # BLD AUTO: 3.52 M/UL (ref 4–5.2)
SODIUM SERPL-SCNC: 140 MMOL/L (ref 136–145)
WBC # BLD AUTO: 6.8 K/UL (ref 4–11)

## 2023-12-19 PROCEDURE — 2500000003 HC RX 250 WO HCPCS

## 2023-12-19 PROCEDURE — 80069 RENAL FUNCTION PANEL: CPT

## 2023-12-19 PROCEDURE — 6360000004 HC RX CONTRAST MEDICATION: Performed by: RADIOLOGY

## 2023-12-19 PROCEDURE — 2580000003 HC RX 258: Performed by: INTERNAL MEDICINE

## 2023-12-19 PROCEDURE — 36415 COLL VENOUS BLD VENIPUNCTURE: CPT

## 2023-12-19 PROCEDURE — 85027 COMPLETE CBC AUTOMATED: CPT

## 2023-12-19 PROCEDURE — 50435 EXCHANGE NEPHROSTOMY CATH: CPT

## 2023-12-19 PROCEDURE — 6360000002 HC RX W HCPCS: Performed by: INTERNAL MEDICINE

## 2023-12-19 PROCEDURE — 99152 MOD SED SAME PHYS/QHP 5/>YRS: CPT

## 2023-12-19 PROCEDURE — C1769 GUIDE WIRE: HCPCS

## 2023-12-19 PROCEDURE — 51705 CHANGE OF BLADDER TUBE: CPT

## 2023-12-19 PROCEDURE — C1894 INTRO/SHEATH, NON-LASER: HCPCS

## 2023-12-19 PROCEDURE — 99153 MOD SED SAME PHYS/QHP EA: CPT

## 2023-12-19 PROCEDURE — C1729 CATH, DRAINAGE: HCPCS

## 2023-12-19 PROCEDURE — 75984 XRAY CONTROL CATHETER CHANGE: CPT

## 2023-12-19 PROCEDURE — 1200000000 HC SEMI PRIVATE

## 2023-12-19 PROCEDURE — 6360000002 HC RX W HCPCS

## 2023-12-19 RX ADMIN — WATER 5 MG: 1 INJECTION INTRAMUSCULAR; INTRAVENOUS; SUBCUTANEOUS at 18:41

## 2023-12-19 RX ADMIN — SODIUM CHLORIDE, PRESERVATIVE FREE 10 ML: 5 INJECTION INTRAVENOUS at 21:23

## 2023-12-19 RX ADMIN — IOHEXOL 50 ML: 350 INJECTION, SOLUTION INTRAVENOUS at 11:20

## 2023-12-19 RX ADMIN — HEPARIN SODIUM 5000 UNITS: 5000 INJECTION INTRAVENOUS; SUBCUTANEOUS at 21:23

## 2023-12-19 ASSESSMENT — PAIN SCALES - PAIN ASSESSMENT IN ADVANCED DEMENTIA (PAINAD)
FACIALEXPRESSION: 0
BREATHING: 0
TOTALSCORE: 0
NEGVOCALIZATION: 0
FACIALEXPRESSION: 0
BODYLANGUAGE: 0
NEGVOCALIZATION: 0
CONSOLABILITY: 0
BODYLANGUAGE: 0
BREATHING: 0
TOTALSCORE: 0
CONSOLABILITY: 0

## 2023-12-19 ASSESSMENT — PAIN SCALES - GENERAL: PAINLEVEL_OUTOF10: 0

## 2023-12-19 NOTE — PROGRESS NOTES
Physician Progress Note      PATIENT:               Kamryn Napier  CSN #:                  [id number]  :                       1940  ADMIT DATE:       2023 4:05 AM  [facility] DATE:  RESPONDING  PROVIDER #:        Leora Fofana MD          QUERY TEXT:    Patient admitted  with AMS and ABNER. No documentation of baseline   creatinine. Last creatinine prior to admission was from 12/10/23 at 1.4. Presenting creatinine 1.7. In order to support the diagnosis of ABNER, please   include additional KDIGO clinical indicators in your documentation. ? Or please   document if the diagnosis of ABNER has been ruled out after further study. The medical record reflects the following:  Risk Factors: 78 y.o. female with PMH bladder cancer, vesicular fistula, HTN,   CKD 3B, nephrolithiasis, hydronephrosis, s/p nephrostomy tube placement  Clinical Indicators: No documentation of baseline creatinine. Last creatinine   prior to admission was from 12/10/23 at 1.4. Presenting creatinine 1.7  Treatment:  Urology consult, monitor daily labs    Defined by Kidney Disease Improving Global Outcomes (KDIGO) clinical practice   guideline for acute kidney injury:  -Increase in SCr by greater than or equal to 0.3 mg/dl within 48 hours; or  -Increase or decrease in SCr to greater than or equal to 1.5 times baseline,   which is known or presumed to have occurred within the prior 7 days; or  -Urine volume < 0.5ml/kg/h for 6 hours. Options provided:  -- Acute kidney injury ruled out after study  -- Acute kidney injury evidenced by, Please document KDIGO evidence as well as   a numerical baseline creatinine, if known. -- Other - I will add my own diagnosis  -- Disagree - Not applicable / Not valid  -- Disagree - Clinically unable to determine / Unknown  -- Refer to Clinical Documentation Reviewer    PROVIDER RESPONSE TEXT:    Acute kidney injury was ruled out after study.     Query created by: Jeannie Adorno on 2023 8:24

## 2023-12-19 NOTE — CARE COORDINATION
Patient is from home with her son Kishor Pink and family would like for her to return to home at d/c. Currently she remains confused and restrained due to agitation. Urology unable to replace PCN today. Psych consulted.      Electronically signed by Gege Frank RN on 12/19/2023 at 2:58 PM  [phone]

## 2023-12-19 NOTE — H&P
Patient:  Stephanie Walters   :   1940      Relevant clinical history, particularly as it involves the pending procedure, was reviewed and discussed. The procedure including risks and benefits was discussed at length with the patient (or designated family member) and all questions were answered. Informed consent to proceed with the procedure was given. Vital signs were monitored and documented by the Radiology nurse. Targeted physical examination  Heart : regular rate and rhythm  Lungs : clear, breathing easily  Condition : stable    [facility] nurses notes reviewed and agreed. Past Medical History:        Diagnosis Date    Arthritis     Bladder cancer ([address])     Chronic renal failure, stage 3 (moderate), unspecified whether stage 3a or 3b CKD ([address])     Kidney stone     Osteoporosis     Senile dementia with behavioral disturbance ([address])     Wears dentures     WEARS UPPER ONES CURRENTLY    Wears glasses     Wears hearing aid     BILATERAL       Past Surgical History:           Procedure Laterality Date    BLADDER SURGERY N/A     r/t bladder cancer    IR NEPHROSTOMY PERCUTANEOUS RIGHT  2023    IR NEPHROSTOMY PERCUTANEOUS RIGHT 2023 [facility] SPECIAL PROCEDURES    IR URETERAL STENT PLACEMENT THRU EXIST TRACT  8/15/2023    IR URETERAL STENT PLACEMENT THRU EXIST TRACT [facility]    KIDNEY STONE REMOVAL Right 2023    RIGHT PERCUTANEOUS NEPHROLITHOTOMY, INSERTION OF RIGHT PERCUTANEOUS NEPHROSTOMY TUBE, INSERTION OF GUIDEWIRE FOR PROCEDURE, POSSIBLE HOLMIUM LASER, CYSTOSCOPY performed by Angela Morin MD at [address] [place] [address] Left 2023    EXCISION OF CHRONIC WOUND OF THE LEFT GROIN performed by Eldred Collet, MD at [address]:  Patient has no known allergies. Medications:   Home Meds  No current facility-administered medications on file prior to encounter.      Current Outpatient Medications on File Prior to Encounter   Medication Sig

## 2023-12-19 NOTE — PROCEDURES
IR Brief Postoperative Note    Amy Ricks  YOB: 1940  [id number]    Pre-operative Diagnosis: ureteral stricture     Post-operative Diagnosis: Same    Procedure: right pcn exchange. Unable to obtain wire access to ureter for stenting. Would recommend reattempt from below given improved vectors and wire support if that is desired.    [place] catheter exchange via vesicocutaneous fistula    Anesthesia: mod    Surgeons/Assistants: shellie    Estimated Blood Loss: Minimal    Complications: none    Specimens: were not obtained    See full procedure dictation to follow      Poli Chao MD   12/19/2023

## 2023-12-19 NOTE — PROGRESS NOTES
Physical Therapy/Occupational therapy  Hold    Chart reviewed. Pt is in restraints, has not been compliant with care per notes. Will hold therapy today and f/u later date as appropriate. Discussed with RN.     Erik Doll, PT, DPT  [id number]  Cristel Smith, OTR/L, [id number]

## 2023-12-19 NOTE — PROGRESS NOTES
PCP note. Regarding baseline mental status. No previous psych problems ever. When last seen on 10/4/23 for pre-anaesthetic assessment pt was oriented in all spheres. She was also quite garrulous and somewhat tangential in her interactions, but this is the way she has always presented, thought it was a little more pronounced than usual.  Did not seem overtly manic. Valentino Quitter MD  [phone].

## 2023-12-19 NOTE — PROGRESS NOTES
Patient was becoming restless and agitated so prn IM zyprexa was given @ approx 2200. Sitter called this RN at approx [phone] because patient had become more restless and combative. Upon entering room, patient was trying to throw IV pole at sitter, hit this RN and pull out IV. Patient was placed in bilateral upper extremity restraints for the safety of both patient and staff. MD contacted via perfect serve for order for restraints - awaiting order.      Electronically signed by Kiki Meza RN on 12/18/2023 at 11:59 PM

## 2023-12-19 NOTE — PROGRESS NOTES
Patient refusing all blood draws this AM.     Electronically signed by Kiki Meza RN on 12/19/2023 at 5:27 AM

## 2023-12-19 NOTE — PROGRESS NOTES
auscultation  Gastrointestinal: Soft, non tender  BS Positive  Genitourinary: no suprapubic tenderness  Musculoskeletal:  edema none  Skin: warm, dry  Neuro: A & O: Self  Psych: She becomes very agitated at  Once to leave does not know where she is oriented only to self        Medications:   Medications:    vitamin C  500 mg Oral Daily    therapeutic multivitamin-minerals  1 tablet Oral Daily    sodium chloride flush  5-40 mL IntraVENous 2 times per day    heparin (porcine)  5,000 Units SubCUTAneous BID      Infusions:    sodium chloride       PRN Meds: OLANZapine (ZyPREXA) 5 mg in sterile water 1 mL injection, 5 mg, Q6H PRN  melatonin, 4.5 mg, Nightly PRN  sodium chloride flush, 5-40 mL, PRN  sodium chloride, , PRN  ondansetron, 4 mg, Q8H PRN   Or  ondansetron, 4 mg, Q6H PRN  acetaminophen, 650 mg, Q6H PRN   Or  acetaminophen, 650 mg, Q6H PRN        Labs and Imaging   No results found.    CBC:   Recent Labs     12/17/23  0428 12/18/23  0956 12/19/23  0905   WBC 7.4 7.0 6.8   HGB 10.5* 10.5* 10.1*    252 251       BMP:    Recent Labs     12/17/23  0428 12/17/23  0520 12/18/23  0956 12/19/23  0905     --  141 140   K 5.8* 3.9 5.0 4.2     --  109 106   CO2 18*  --  21 26   BUN 21*  --  17 14   CREATININE 1.7*  --  1.5* 1.4*   GLUCOSE 192*  --  98 90       Hepatic:   Recent Labs     12/18/23  0956   AST 19   ALT 7*   BILITOT 0.3   ALKPHOS 72     Lipids:   Lab Results   Component Value Date/Time    CHOL 217 09/03/2022 10:21 AM    HDL 44 09/03/2022 10:21 AM    HDL 64 11/08/2011 11:09 AM    TRIG 109 09/03/2022 10:21 AM     Hemoglobin A1C:   Lab Results   Component Value Date/Time    LABA1C 6.7 08/02/2023 09:07 AM     TSH: No results found for: \"TSH\"  Troponin: No results found for: \"TROPONINT\"  Lactic Acid: No results for input(s): \"LACTA\" in the last 72 hours.  BNP: No results for input(s): \"PROBNP\" in the last 72 hours.  UA:  Lab Results   Component Value Date/Time    NITRU Negative 12/15/2023 12:42  and may contain errors related to that system including errors in grammar, punctuation, and spelling, as well as words and phrases that may be inappropriate. If there are any questions or concerns please feel free to contact the dictating provider for clarification.

## 2023-12-20 PROBLEM — T83.098A MALFUNCTION OF NEPHROSTOMY TUBE (HCC): Status: ACTIVE | Noted: 2023-12-20

## 2023-12-20 PROBLEM — N39.0 COMPLICATED UTI (URINARY TRACT INFECTION): Status: ACTIVE | Noted: 2023-12-20

## 2023-12-20 PROCEDURE — 6360000002 HC RX W HCPCS: Performed by: INTERNAL MEDICINE

## 2023-12-20 PROCEDURE — 99222 1ST HOSP IP/OBS MODERATE 55: CPT | Performed by: NURSE PRACTITIONER

## 2023-12-20 PROCEDURE — 2580000003 HC RX 258: Performed by: INTERNAL MEDICINE

## 2023-12-20 PROCEDURE — 6360000002 HC RX W HCPCS: Performed by: SURGERY

## 2023-12-20 PROCEDURE — 97166 OT EVAL MOD COMPLEX 45 MIN: CPT

## 2023-12-20 PROCEDURE — 2580000003 HC RX 258: Performed by: SURGERY

## 2023-12-20 PROCEDURE — 97163 PT EVAL HIGH COMPLEX 45 MIN: CPT

## 2023-12-20 PROCEDURE — 6360000002 HC RX W HCPCS: Performed by: NURSE PRACTITIONER

## 2023-12-20 PROCEDURE — 97535 SELF CARE MNGMENT TRAINING: CPT

## 2023-12-20 PROCEDURE — 6370000000 HC RX 637 (ALT 250 FOR IP): Performed by: INTERNAL MEDICINE

## 2023-12-20 PROCEDURE — 97116 GAIT TRAINING THERAPY: CPT

## 2023-12-20 PROCEDURE — 99222 1ST HOSP IP/OBS MODERATE 55: CPT | Performed by: INTERNAL MEDICINE

## 2023-12-20 PROCEDURE — 1200000000 HC SEMI PRIVATE

## 2023-12-20 PROCEDURE — 97530 THERAPEUTIC ACTIVITIES: CPT

## 2023-12-20 RX ORDER — LEVOFLOXACIN 500 MG/1
500 TABLET, FILM COATED ORAL DAILY
Status: DISCONTINUED | OUTPATIENT
Start: 2023-12-20 | End: 2023-12-20 | Stop reason: DRUGHIGH

## 2023-12-20 RX ORDER — HALOPERIDOL 1 MG/1
0.25 TABLET ORAL 3 TIMES DAILY PRN
Status: DISCONTINUED | OUTPATIENT
Start: 2023-12-20 | End: 2023-12-23 | Stop reason: HOSPADM

## 2023-12-20 RX ORDER — HALOPERIDOL 5 MG/ML
0.5 INJECTION INTRAMUSCULAR 2 TIMES DAILY PRN
Status: DISCONTINUED | OUTPATIENT
Start: 2023-12-20 | End: 2023-12-23 | Stop reason: HOSPADM

## 2023-12-20 RX ORDER — LEVOFLOXACIN 500 MG/1
500 TABLET, FILM COATED ORAL ONCE
Status: COMPLETED | OUTPATIENT
Start: 2023-12-20 | End: 2023-12-20

## 2023-12-20 RX ORDER — LEVOFLOXACIN 500 MG/1
250 TABLET, FILM COATED ORAL DAILY
Status: DISCONTINUED | OUTPATIENT
Start: 2023-12-21 | End: 2023-12-23 | Stop reason: HOSPADM

## 2023-12-20 RX ADMIN — LEVOFLOXACIN 500 MG: 500 TABLET, FILM COATED ORAL at 17:38

## 2023-12-20 RX ADMIN — CEFTRIAXONE 1000 MG: 1 INJECTION, POWDER, FOR SOLUTION INTRAMUSCULAR; INTRAVENOUS at 10:13

## 2023-12-20 RX ADMIN — WATER 5 MG: 1 INJECTION INTRAMUSCULAR; INTRAVENOUS; SUBCUTANEOUS at 02:59

## 2023-12-20 RX ADMIN — SODIUM CHLORIDE, PRESERVATIVE FREE 10 ML: 5 INJECTION INTRAVENOUS at 10:11

## 2023-12-20 RX ADMIN — HALOPERIDOL LACTATE 0.5 MG: 5 INJECTION, SOLUTION INTRAMUSCULAR at 13:02

## 2023-12-20 RX ADMIN — FLUCONAZOLE 100 MG: 200 INJECTION, SOLUTION INTRAVENOUS at 12:01

## 2023-12-20 RX ADMIN — HEPARIN SODIUM 5000 UNITS: 5000 INJECTION INTRAVENOUS; SUBCUTANEOUS at 09:59

## 2023-12-20 RX ADMIN — HALOPERIDOL LACTATE 0.5 MG: 5 INJECTION, SOLUTION INTRAMUSCULAR at 23:13

## 2023-12-20 ASSESSMENT — PAIN SCALES - PAIN ASSESSMENT IN ADVANCED DEMENTIA (PAINAD)
BODYLANGUAGE: 0
CONSOLABILITY: 0
BREATHING: 0
NEGVOCALIZATION: 0
TOTALSCORE: 0
FACIALEXPRESSION: 0
BODYLANGUAGE: 0
BREATHING: 0
FACIALEXPRESSION: 0
FACIALEXPRESSION: 0
CONSOLABILITY: 0
BODYLANGUAGE: 0
NEGVOCALIZATION: 0
TOTALSCORE: 0
BREATHING: 0
NEGVOCALIZATION: 0
TOTALSCORE: 0
CONSOLABILITY: 0

## 2023-12-20 NOTE — PROGRESS NOTES
Occupational Therapy  Facility/Department: [address] [place] [address] Ave Therapy Initial Assessment/Treatment     Name: Shar Menchaca  : 1940  MRN: [id number]  Date of Service: 2023    Discharge Recommendations:Portia Rosa scored a  on the AM-PAC ADL Inpatient form. Current research shows that an AM-PAC score of 17 or less is typically not associated with a discharge to the patient's home setting. Based on the patient's AM-PAC score and their current ADL deficits, it is recommended that the patient have 3-5 sessions per week of Occupational Therapy at d/c to increase the patient's independence. Please see assessment section for further patient specific details. If patient discharges prior to next session this note will serve as a discharge summary. Please see below for the latest assessment towards goals. OT Equipment Recommendations  Other: defer       Patient Diagnosis(es): The primary encounter diagnosis was Malfunction of nephrostomy tube ([address]). A diagnosis of Agitation was also pertinent to this visit. Past Medical History:  has a past medical history of Arthritis, Bladder cancer ([address]), Chronic renal failure, stage 3 (moderate), unspecified whether stage 3a or 3b CKD ([address]), Kidney stone, Osteoporosis, Senile dementia with behavioral disturbance ([address]), Wears dentures, Wears glasses, and Wears hearing aid. Past Surgical History:  has a past surgical history that includes Bladder surgery (N/A); lymph node biopsy (Left, 2023); Kidney stone removal (Right, 2023); IR GUIDED URETERAL STENT PLACE THRU EXIST TRACT (8/15/2023); and IR GUIDED NEPHROSTOMY CATH PLACEMENT RIGHT (2023). Treatment Diagnosis: Decreased ADL status and cognition 2/2 AMS      Assessment   Performance deficits / Impairments: Decreased functional mobility ; Decreased endurance;Decreased ADL status; Decreased balance;Decreased safe awareness;Decreased high-level IADLs;Decreased

## 2023-12-20 NOTE — PROGRESS NOTES
Pharmacy Note - Renal dose adjustment made per P/T protocol    Original order:  Levofloxacin 500 mg daily x 5 days    Estimated Creatinine Clearance: 21 mL/min (A) (based on SCr of 1.4 mg/dL (H)). Recent Labs     12/18/23 0956 12/19/23  0905   BUN 17 14   CREATININE 1.5* 1.4*       Renally adjusted order:  Levofloxacin 500 mg once, followed by 250 mg po daily x 4 days    Please call pharmacy with any questions.     Thank you,  Beulah Jenkins, [facility]  12/20/2023 5:00 PM

## 2023-12-20 NOTE — CARE COORDINATION
Patient is from home with son and he plans on bringing her home at d/c. He did agree to [employer] at d/c. We discussed how her mobility is below baseline at this time but he expects that she will improve once she is less confused. He does not want to place her in a facility at this time.      Electronically signed by Gerard Fowler RN on 12/20/2023 at 4:07 PM  [phone]

## 2023-12-20 NOTE — CONSULTS
Psychiatry Initial Consultation    Patient Name: Portia Waggoner  MRN: [id number]  Admission Date: 12/17/2023    Reason for Consult: Hyperactive delirium, unresolving, pharmacologic recommendations appreciated    HPI:   Portia Waggoner is a 83 y.o. female who presented to the hospital on 12/17/2023 with a chief complaint of damaged nephrostomy tube. Per ED documentation, Patient is unable to provide history at the time but per EMS, the patient was found wandering in the neighborhood knocking on different people's doors.  EMS is familiar with the patient and states that they took her back to her house where her son was in the patient became very agitated.  They also noted that the patient had damaged her nephrostomy tube and the bag was missing.  The patient on arrival was very agitated, screaming obscenities and racist comments.  She was able to be somewhat verbally de-escalated by nursing staff but was unable to say what happened to her nephrostomy tube.  I did speak with the patient's son over the phone who states that tonight he found part of her nephrostomy tube and the bag in the trash and does not know how it became damaged.  He states that the patient has intermittent episodes of agitation, especially when she has to go to the hospital.  He states that other times that she is very kind and reasonable but he states he is having increased difficulty caring for her and relates it to her having the nephrostomy tube.     Chart review indicates son reporting no history of dementia and patient behaves normally the majority of the time. Son reported to palliative care that he believes confusion is due to infection, not a psychiatric issue. Chart review indicates first mention of dementia was November 2023 (... I am concerned that there may be some dementia setting in or this could be confusion from a urinary tract infection.)     Met with Portia, who was a poor historian. She presents as confused and hyperverbal,  although she was pleasant during my interaction with her. She was unsure where she currently is when asked but was able to identify a hospital when given options. She could not tell me the month but was able to identify it as 2023. She could not tell me why she is in the hospital. She stated that she knows me from somewhere (we've never met) and that I' here because I have chores to do. She then told me that last night, a group of them went to a park and built shoes. Call placed to [facility], patient's daughter/ANDREW. No answer. Duration: 3-4 days  Severity: Moderate-Severe  Context: Stress, medical issues, UTI  Associated Symptoms: As above    Past Psychiatric History:    Past psychiatric history is widely unknown due to patient's current presentation. Chart review indicates a previous diagnosis of dementia but it appears that this has only been noted in the context of when she has presented to the hospital with infections so it is unclear if this is an accurate diagnosis. Son denied history of dementia. No previous psychiatric hospitalizations or outpatient psychiatric treatment found on chart review. No known previous suicide attempts. She has been agitated and aggressive towards staff at times during this admission. No known previous psychiatric medication trials. She is not on any current psychiatric medications. Past Medical History:  Past Medical History:   Diagnosis Date    Arthritis     Bladder cancer ([address])     Chronic renal failure, stage 3 (moderate), unspecified whether stage 3a or 3b CKD ([address])     Kidney stone     Osteoporosis     Senile dementia with behavioral disturbance ([address])     Wears dentures     WEARS UPPER ONES CURRENTLY    Wears glasses     Wears hearing aid     BILATERAL     No Known Allergies    Home Medications:  Prior to Admission medications    Medication Sig Start Date End Date Taking?  Authorizing Provider   melatonin 5 MG TABS tablet Take 1 tablet by mouth nightly as needed further evaluation of possible dementia. Referrals placed in discharge instructions. Will order Haldol 0.25 mg PO TID PRN or 0.5 mg IM BID PRN to help with delirium. Can increase oral dose to 0.5 mg if 0.25 mg is ineffective. I called Portia's daughter/HCPOA to discuss the possibility of scheduling this (unclear if it will need to be a long term medication) but she did not answer. Please continue to try to get ahold of patient's HCPOA to discuss; Dr. Duc Acosta made aware of need. Monitor QTc if Haldol is given regularly. If there is continued concern with her management of health issues at home, may benefit from higher level of care such as a SNF post-discharge. Spent >60 minutes face to face with patient of which >50% was spent counseling and providing education regarding diagnosis, treatment options, and prognosis. Thank you for consult. Please do not hesitate to contact provider if there are additional questions regarding patient.     Genevieve Painting, MPH, PMHNP-BC  12/20/2023

## 2023-12-20 NOTE — PROGRESS NOTES
Physical Therapy  Facility/Department: [address]  Physical Therapy Initial Assessment / Treatment    Name: Mina Urena  : 1940  MRN: [id number]  Date of Service: 2023    Discharge Recommendations:  Subacute/Skilled Nursing Facility   PT Equipment Recommendations  Equipment Needed: No  Other: defer to next level of care      Patient Diagnosis(es): The primary encounter diagnosis was Malfunction of nephrostomy tube ([address] A diagnosis of Agitation was also pertinent to this visit. Past Medical History:  has a past medical history of Arthritis, Bladder cancer ([address]), Chronic renal failure, stage 3 (moderate), unspecified whether stage 3a or 3b CKD ([address]), Kidney stone, Osteoporosis, Senile dementia with behavioral disturbance ([address]), Wears dentures, Wears glasses, and Wears hearing aid. Past Surgical History:  has a past surgical history that includes Bladder surgery (N/A); lymph node biopsy (Left, 2023); Kidney stone removal (Right, 2023); IR GUIDED URETERAL STENT PLACE THRU EXIST TRACT (8/15/2023); and IR GUIDED NEPHROSTOMY CATH PLACEMENT RIGHT (2023). Assessment   Body Structures, Functions, Activity Limitations Requiring Skilled Therapeutic Intervention: Decreased functional mobility ; Decreased strength;Decreased balance  Assessment: Pt is 78 y.o. female admit with ABNER after recently cutting her nephrostomy drains. Pt is from home with son and is alone during the day, ambulates independently, drives, and is \"ok about 90% of the time\" per son. Today, pt demos significant cognitive impairments and requires repeated cues for all tasks, is easily distracted, and demos tangential conversation. Pt pleasantly confused throughout session. Pt requires 2 person assist for ambulation. Assist of 2nd person required throughout all bed mobility and transfer tasks due to multiple lines and pt confusion. Pt is below her functional baseline. Not safe to return home.   Rec SNF at Demonstration;Verbal  Barriers to Learning: Cognition  Education Outcome: Unable to demonstrate understanding      Therapy Time   Individual Concurrent Group Co-treatment   Time In 1105         Time Out 1145         Minutes 40                 Timed Code Treatment Minutes:  25    Total Treatment Minutes:  40    If patient is discharged prior to next treatment, this note will serve as the discharge summary.   Trixie Tarango, PT, DPT  [phone]

## 2023-12-20 NOTE — CONSULTS
Infectious Diseases Inpatient Consult Note    Medical Student note - reviewed and modified, see Attending addendum at bottom    Reason for Consult:   AMS and UTI  Requesting Physician: Dr. Collette Rivera  Primary Care Physician:  Rebecca Pimentel MD  History Obtained From:   Pt, EPIC    Admit Date: 12/17/2023  Hospital Day: 4    CHIEF COMPLAINT:       Chief Complaint   Patient presents with    Ripped nephrostomy tube       HISTORY OF PRESENT ILLNESS:      Kacie Fenton is an 78 y.o. female with pmh of bladder cancer, vesicular cutaneous fistula with indwelling martinez, HTN, CKD 3B, nephrolithiasis, hydronephrosis s/p nephrostomy tube placement who presented 12/17 with ABNER and damaged right nephrostomy tube. Patient seen in ED 12/10 and 12/15 for AMS and damaged nephrostomy tube. UA showed WBCs and yeast, thought to be contamination. Today, patient was unable to give history due to AMS/delirium. Patient agitated and in restraints.     Past Medical History:    Past Medical History:   Diagnosis Date    Arthritis     Bladder cancer ([address])     Chronic renal failure, stage 3 (moderate), unspecified whether stage 3a or 3b CKD ([address])     Kidney stone     Osteoporosis     Senile dementia with behavioral disturbance ([address])     Wears dentures     WEARS UPPER ONES CURRENTLY    Wears glasses     Wears hearing aid     BILATERAL       Past Surgical History:    Past Surgical History:   Procedure Laterality Date    BLADDER SURGERY N/A     r/t bladder cancer    IR NEPHROSTOMY PERCUTANEOUS RIGHT  11/14/2023    IR NEPHROSTOMY PERCUTANEOUS RIGHT 11/14/2023 [facility] SPECIAL PROCEDURES    IR URETERAL STENT PLACEMENT THRU EXIST TRACT  8/15/2023    IR URETERAL STENT PLACEMENT THRU EXIST TRACT [facility]15/2023 [facility]    KIDNEY STONE REMOVAL Right 8/1/2023    RIGHT PERCUTANEOUS NEPHROLITHOTOMY, INSERTION OF RIGHT PERCUTANEOUS NEPHROSTOMY TUBE, INSERTION OF GUIDEWIRE FOR PROCEDURE, POSSIBLE HOLMIUM LASER, CYSTOSCOPY performed by Chayo Ugarte Mei Antonio MD at [address] [place] [address] Left 2/28/2023    EXCISION OF CHRONIC WOUND OF THE LEFT GROIN performed by Molly Hudson MD at [facility]       Current Medications:     cefTRIAXone (ROCEPHIN) IV  1,000 mg IntraVENous Q24H    fluconazole  100 mg IntraVENous Q24H    vitamin C  500 mg Oral Daily    therapeutic multivitamin-minerals  1 tablet Oral Daily    sodium chloride flush  5-40 mL IntraVENous 2 times per day    heparin (porcine)  5,000 Units SubCUTAneous BID       Allergies:  Patient has no known allergies. Social History:    TOBACCO:    Former  ETOH:    1 glass of wine per week  DRUGS:   Never  MARITAL STATUS:     OCCUPATION:   N/A    Patient lives with son. Family History:   No immunodeficiency    ROS: unable to obtain due to AMS       Physical Exam  Cardiovascular:      Rate and Rhythm: Normal rate and regular rhythm. Heart sounds: Normal heart sounds. Pulmonary:      Effort: Pulmonary effort is normal.      Breath sounds: Normal breath sounds. Abdominal:      Palpations: Abdomen is soft. Tenderness: There is no abdominal tenderness. Neurological:      Mental Status: She is disoriented. Psychiatric:      Comments: Agitated and acutely delirious.        DATA:    Lab Results   Component Value Date    WBC 6.8 12/19/2023    HGB 10.1 (L) 12/19/2023    HCT 30.4 (L) 12/19/2023    MCV 86.2 12/19/2023     12/19/2023     Lab Results   Component Value Date    CREATININE 1.4 (H) 12/19/2023    BUN 14 12/19/2023     12/19/2023    K 4.2 12/19/2023     12/19/2023    CO2 26 12/19/2023       Hepatic Function Panel:   Lab Results   Component Value Date/Time    ALKPHOS 72 12/18/2023 09:56 AM    ALT 7 12/18/2023 09:56 AM    AST 19 12/18/2023 09:56 AM    PROT 6.9 12/18/2023 09:56 AM    BILITOT 0.3 12/18/2023 09:56 AM    BILIDIR <0.2 08/05/2023 10:26 AM    IBILI see below 08/05/2023 10:26 AM    LABALBU 3.6 12/19/2023 09:05 AM       Micro:  Urine culture 12/10 -

## 2023-12-20 NOTE — DISCHARGE INSTRUCTIONS
Outpatient [facility] Referrals  [facility] of [address]  [facility], [address] [facility], [address]  [phone]   [facility] of [address] [facility]  [address] [place] [address], [address] [place] [address], [address] Neuroscience  Multiple offices  [phone]     [phone].

## 2023-12-20 NOTE — PLAN OF CARE
Problem: Discharge Planning  Goal: Discharge to home or other facility with appropriate resources  Outcome: Progressing     Problem: Risk for Elopement  Goal: Patient will not exit the unit/facility without proper excort  Outcome: Progressing  Flowsheets  Taken 12/20/2023 1120 by Abiodun Swan RN  Nursing Interventions for Elopement Risk:   Assist with personal care needs such as toileting, eating, dressing, as needed to reduce the risk of wandering   Collaborate with family members/caregivers to mitigate the elopement risk    Problem: Safety - Adult  Goal: Free from fall injury  Outcome: Progressing  Flowsheets (Taken 12/20/2023 1120)  Free From Fall Injury: Instruct family/caregiver on patient safety     Problem: Confusion  Goal: Confusion, delirium, dementia, or psychosis is improved or at baseline  Description: INTERVENTIONS:  1. Assess for possible contributors to thought disturbance, including medications, impaired vision or hearing, underlying metabolic abnormalities, dehydration, psychiatric diagnoses, and notify attending LIP  2. [place] high risk fall precautions, as indicated  3. Provide frequent short contacts to provide reality reorientation, refocusing and direction  4. Decrease environmental stimuli, including noise as appropriate     Problem: Safety - Medical Restraint  Goal: Remains free of injury from restraints (Restraint for Interference with Medical Device)  Description: INTERVENTIONS:  1. Determine that other, less restrictive measures have been tried or would not be effective before applying the restraint  2. Evaluate the patient's condition at the time of restraint application  3. Inform patient/family regarding the reason for restraint  4.  Q2H: Monitor safety, psychosocial status, comfort, nutrition and hydration  Outcome: Progressing

## 2023-12-21 PROCEDURE — 2580000003 HC RX 258: Performed by: INTERNAL MEDICINE

## 2023-12-21 PROCEDURE — 6360000002 HC RX W HCPCS: Performed by: NURSE PRACTITIONER

## 2023-12-21 PROCEDURE — 99232 SBSQ HOSP IP/OBS MODERATE 35: CPT | Performed by: INTERNAL MEDICINE

## 2023-12-21 PROCEDURE — 1200000000 HC SEMI PRIVATE

## 2023-12-21 PROCEDURE — 6370000000 HC RX 637 (ALT 250 FOR IP): Performed by: INTERNAL MEDICINE

## 2023-12-21 PROCEDURE — 6370000000 HC RX 637 (ALT 250 FOR IP): Performed by: SURGERY

## 2023-12-21 PROCEDURE — 6360000002 HC RX W HCPCS: Performed by: INTERNAL MEDICINE

## 2023-12-21 RX ORDER — FLUCONAZOLE 200 MG/1
100 TABLET ORAL DAILY
Status: DISCONTINUED | OUTPATIENT
Start: 2023-12-21 | End: 2023-12-23 | Stop reason: HOSPADM

## 2023-12-21 RX ORDER — LEVOFLOXACIN 250 MG/1
250 TABLET, FILM COATED ORAL DAILY
Qty: 4 TABLET | Refills: 0 | Status: SHIPPED | OUTPATIENT
Start: 2023-12-21 | End: 2023-12-25

## 2023-12-21 RX ORDER — FLUCONAZOLE 100 MG/1
100 TABLET ORAL DAILY
Qty: 4 TABLET | Refills: 0 | Status: SHIPPED | OUTPATIENT
Start: 2023-12-21 | End: 2023-12-25

## 2023-12-21 RX ADMIN — FLUCONAZOLE 100 MG: 200 TABLET ORAL at 08:50

## 2023-12-21 RX ADMIN — SODIUM CHLORIDE, PRESERVATIVE FREE 10 ML: 5 INJECTION INTRAVENOUS at 08:47

## 2023-12-21 RX ADMIN — Medication 1 TABLET: at 08:47

## 2023-12-21 RX ADMIN — Medication 4.5 MG: at 21:53

## 2023-12-21 RX ADMIN — HEPARIN SODIUM 5000 UNITS: 5000 INJECTION INTRAVENOUS; SUBCUTANEOUS at 08:47

## 2023-12-21 RX ADMIN — OXYCODONE HYDROCHLORIDE AND ACETAMINOPHEN 500 MG: 500 TABLET ORAL at 08:47

## 2023-12-21 RX ADMIN — HALOPERIDOL LACTATE 0.5 MG: 5 INJECTION, SOLUTION INTRAMUSCULAR at 13:04

## 2023-12-21 RX ADMIN — ACETAMINOPHEN 650 MG: 325 TABLET ORAL at 21:54

## 2023-12-21 RX ADMIN — LEVOFLOXACIN 250 MG: 500 TABLET, FILM COATED ORAL at 08:47

## 2023-12-21 ASSESSMENT — PAIN SCALES - PAIN ASSESSMENT IN ADVANCED DEMENTIA (PAINAD)
NEGVOCALIZATION: 0
BODYLANGUAGE: 0
TOTALSCORE: 0
BREATHING: 0
BREATHING: 0
NEGVOCALIZATION: 0
BODYLANGUAGE: 0
BREATHING: 0
FACIALEXPRESSION: 0
CONSOLABILITY: 0
TOTALSCORE: 0
CONSOLABILITY: 0
FACIALEXPRESSION: 0
NEGVOCALIZATION: 0
FACIALEXPRESSION: 0
BODYLANGUAGE: 0
CONSOLABILITY: 0
TOTALSCORE: 0

## 2023-12-21 NOTE — CARE COORDINATION
Patient is from home with son and he wants to bring her back home at d/c. He has agreed to [facility] and called them upon pt's admission. She was still in restraints this AM but they have since been removed and a sitter is present.      Electronically signed by Smitha Ramachandran RN on 12/21/2023 at 12:14 PM  [phone]

## 2023-12-21 NOTE — PROGRESS NOTES
12/21/23 1221   Encounter Summary   Encounter Overview/Reason  Spiritual/Emotional Needs   Service Provided For: Patient   Referral/Consult From: [address] Children;Family members   Last Encounter  12/21/23  (visit w/pt, support, conversation, blessing, ke 12/21/23)   Complexity of Encounter Moderate   Begin Time 1215   End Time  1230   Total Time Calculated 15 min   Spiritual/Emotional needs   Type Spiritual Support   Rituals, Rites and Sacraments   Type Blessings   Assessment/Intervention/Outcome   Assessment Coping; Hopeful   Intervention Active listening;Explored/Affirmed feelings, thoughts, concerns;Sustaining Presence/Ministry of presence   Outcome Acceptance; Connection/Belonging;Encouraged;Expressed Gratitude     Rev Corry Myers, [address] [employer] [place]

## 2023-12-21 NOTE — PROGRESS NOTES
Occupational/Physical Therapy    Attempted PT/OT tx session. Pt currently agitated, yelling out with nursing care taking place in room. Therapy not appropriate at this time. Will continue to attempt per POC.      Arabella Whipple, OTR/L, 60[address]  Jaime Singh, PT

## 2023-12-21 NOTE — PLAN OF CARE
Problem: Confusion  Goal: Confusion, delirium, dementia, or psychosis is improved or at baseline  Outcome: Not Progressing  Note: Patient still very confused. Alert to self only. Patient became increasingly confused and combative this afternoon requiring dose of prn haldol. Problem: Safety - Adult  Goal: Free from fall injury  Outcome: Progressing  Note: All safety precautions in place. Sitter at bedside. Problem: ABCDS Injury Assessment  Goal: Absence of physical injury  Outcome: Progressing  Note: Pt is a High fall risk. See Gonsalves Portia Fall Score and ABCDS Injury Risk assessments.   + Screening for Orthostasis and/or + High Fall Risk per MEADOWS/ABCDS: Explained fall risk precautions to pt and family and rationale behind their use to keep the patient safe. Pt bed is in low position, side rails up, call light and belongings are in reach. Fall wristband applied and present on pts wrist.  Bed alarm on. Pt encouraged to call for assistance. Will continue with hourly rounds for PO intake, pain needs, toileting and repositioning as needed.

## 2023-12-22 PROCEDURE — 1200000000 HC SEMI PRIVATE

## 2023-12-22 PROCEDURE — 6360000002 HC RX W HCPCS: Performed by: NURSE PRACTITIONER

## 2023-12-22 PROCEDURE — 99232 SBSQ HOSP IP/OBS MODERATE 35: CPT | Performed by: INTERNAL MEDICINE

## 2023-12-22 PROCEDURE — 2580000003 HC RX 258: Performed by: INTERNAL MEDICINE

## 2023-12-22 PROCEDURE — 6370000000 HC RX 637 (ALT 250 FOR IP): Performed by: INTERNAL MEDICINE

## 2023-12-22 PROCEDURE — 6360000002 HC RX W HCPCS: Performed by: INTERNAL MEDICINE

## 2023-12-22 PROCEDURE — 6370000000 HC RX 637 (ALT 250 FOR IP): Performed by: SURGERY

## 2023-12-22 RX ADMIN — FLUCONAZOLE 100 MG: 200 TABLET ORAL at 09:54

## 2023-12-22 RX ADMIN — HALOPERIDOL LACTATE 0.5 MG: 5 INJECTION, SOLUTION INTRAMUSCULAR at 15:58

## 2023-12-22 RX ADMIN — Medication 1 TABLET: at 09:54

## 2023-12-22 RX ADMIN — HEPARIN SODIUM 5000 UNITS: 5000 INJECTION INTRAVENOUS; SUBCUTANEOUS at 09:54

## 2023-12-22 RX ADMIN — Medication 4.5 MG: at 21:27

## 2023-12-22 RX ADMIN — OXYCODONE HYDROCHLORIDE AND ACETAMINOPHEN 500 MG: 500 TABLET ORAL at 09:54

## 2023-12-22 RX ADMIN — SODIUM CHLORIDE, PRESERVATIVE FREE 5 ML: 5 INJECTION INTRAVENOUS at 10:01

## 2023-12-22 RX ADMIN — LEVOFLOXACIN 250 MG: 500 TABLET, FILM COATED ORAL at 09:54

## 2023-12-22 ASSESSMENT — PAIN SCALES - PAIN ASSESSMENT IN ADVANCED DEMENTIA (PAINAD)
BODYLANGUAGE: 0
TOTALSCORE: 0
CONSOLABILITY: 0
NEGVOCALIZATION: 0
FACIALEXPRESSION: 0
BREATHING: 0

## 2023-12-22 NOTE — PROGRESS NOTES
ID Follow-up NOTE    CC:   Complicated UTI - displaced nephrostomy tube   Antibiotics: Levofloxacin, Fluconazole     Admit Date: 12/17/2023  Hospital Day: 6    Subjective:     Patient is lethargic this am  No specific complaint     Objective:     Patient Vitals for the past 8 hrs:   BP Temp Temp src Pulse Resp SpO2   12/22/23 0741 107/61 -- -- 74 16 --   12/22/23 0346 114/60 97.2 °F (36.2 °C) Axillary 59 16 97 %       I/O last 3 completed shifts:  In: 600 [P.O.:600]  Out: 550 [Urine:550]  No intake/output data recorded.    EXAM:  GENERAL: No apparent distress.    HEENT: Membranes moist, no oral lesion  NECK:  Supple, no lymphadenopathy  LUNGS: Clear b/l, no rales, no dullness  CARDIAC: RRR, no murmur appreciated  ABD:  + BS, soft / NT - R nephrotomy tube in place   EXT:  No rash, no edema, no lesions  NEURO: No focal neurologic findings  PSYCH: Orientation, sensorium, mood normal  LINES:  Peripheral iv       Data Review:  Lab Results   Component Value Date    WBC 6.8 12/19/2023    HGB 10.1 (L) 12/19/2023    HCT 30.4 (L) 12/19/2023    MCV 86.2 12/19/2023     12/19/2023     Lab Results   Component Value Date    CREATININE 1.4 (H) 12/19/2023    BUN 14 12/19/2023     12/19/2023    K 4.2 12/19/2023     12/19/2023    CO2 26 12/19/2023       Hepatic Function Panel:   Lab Results   Component Value Date/Time    ALKPHOS 72 12/18/2023 09:56 AM    ALT 7 12/18/2023 09:56 AM    AST 19 12/18/2023 09:56 AM    PROT 6.9 12/18/2023 09:56 AM    BILITOT 0.3 12/18/2023 09:56 AM    BILIDIR <0.2 08/05/2023 10:26 AM    IBILI see below 08/05/2023 10:26 AM    LABALBU 3.6 12/19/2023 09:05 AM       Micro:  Urine culture 12/10 - Candida lusitaniae     Imaging:   None      Scheduled Meds:   fluconazole  100 mg Oral Daily    levoFLOXacin  250 mg Oral Daily    vitamin C  500 mg Oral Daily    therapeutic multivitamin-minerals  1 tablet Oral Daily    sodium chloride flush  5-40 mL IntraVENous 2 times per day    heparin (porcine)   5,000 Units SubCUTAneous BID       Continuous Infusions:   sodium chloride         PRN Meds:  haloperidol **OR** haloperidol lactate, melatonin, sodium chloride flush, sodium chloride, ondansetron **OR** ondansetron, acetaminophen **OR** acetaminophen      Assessment:     PMH - bladder ca, , HTN, CKD, nephrolithiasis  PSurgHx -  procedures      Hx R hydronephrosis, s/p R NT placement 8/1/23, unable to place antegrade stent 12/4  Hx 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       Presents on 12/17 with change in status / confused   In ED, afeb, WBC 7.4, Cr 1.7  No UA (12/15 - mod LE, micro 6-9 WBC)  12/19 IR replaced R NT tube      Today 12/20 -  unable to provide hx  On Ceftriaxone and Fluconazole   Seen by Psych NP     IMP/  Encephalopathy / Psych    - R UPJ stenosis, requiring PCN  R PCN contaminated - bag off / catheter dislodged, replaced PCN     NKDA      Plan:     Complete course of empiric antibiotics   - Levofloxacin + Fluconazole x 3 days further   R PCN / poss pyeloplasty per       f/u    Medical Decision Making:   The following items were considered in medical decision making:  Discussion of patient care with other providers  Reviewed clinical lab tests  Reviewed radiology tests  Reviewed other diagnostic tests/interventions  Microbiology cultures and other micro tests reviewed      Discussed with pt, RN  Will sign off, call with ID issues  Shelbie Luis MD

## 2023-12-22 NOTE — PROGRESS NOTES
Pt restless, agitated at beginning of the shift  Given melatonin and Tylenol for headache  Resting eyes closed during remainder of shift  Minimal output to drains  Sitter bedside

## 2023-12-22 NOTE — PROGRESS NOTES
Pt starting to become more confused and agitated with caregivers. Stating \"I need to get out of here\". Attempted to get OOB without assistance. Haldol IM given as ordered. Sitter remains at the bedside. Will cont to monitor for safety.

## 2023-12-22 NOTE — PLAN OF CARE
Problem: Pain  Goal: Verbalizes/displays adequate comfort level or baseline comfort level  Outcome: Progressing     Problem: Safety - Adult  Goal: Free from fall injury  12/22/2023 0253 by Nohemy Powers RN  Outcome: Progressing

## 2023-12-22 NOTE — PROGRESS NOTES
Pt's son Vasquez Dunbar called.  Stated he was still out of town for work and would need to pick pt up Saturday at [address] [facility].

## 2023-12-22 NOTE — CARE COORDINATION
Case Management Assessment            Discharge Note                    Date / Time of Note: 12/22/2023 3:36 PM                  Discharge Note Completed by: Brandt Piedra RN    Patient Name: Sonya Felix   YOB: 1940  Diagnosis: Agitation [R45.1]  ABNER (acute kidney injury) ([address]) [N17.9]  Malfunction of nephrostomy tube [facility]) [T83.098A]   Date / Time: 12/17/2023  4:05 AM    Current PCP: Willis Willis MD  Clinic patient: No    Hospitalization in the last 30 days: No  Readmission Assessment  Number of Days since last admission?: 8-30 days  Previous Disposition: Home with Home Health  Who is being Interviewed: Caregiver  What was the patient's/caregiver's perception as to why they think they needed to return back to the hospital?: Other (Comment) (change in condition)  Did you visit your Primary Care Physician after you left the hospital, before you returned this time?: No  Why weren't you able to visit your PCP?: Did not have an appointment  Did you see a specialist, such as Cardiac, Pulmonary, Orthopedic Physician, etc. after you left the hospital?: No  Who advised the patient to return to the hospital?: Self-referral  Does the patient report anything that got in the way of taking their medications?: No  In our efforts to provide the best possible care to you and others like you, can you think of anything that we could have done to help you after you left the hospital the first time, so that you might not have needed to return so soon?: Arrange for more help when leaving the hospital, Discharge instructions that are concise, clear, and non contradictory, Education on how to continue taking medications upon discharge    Advance Directives:  Code Status: Full Code  [place] DNR form completed and on chart: No    Financial:  Payor: [employer]les[employer] Polk / Plan: Anibal Canela / Product Type: *No Product type* /      Pharmacy:    [facility] [address] [place] [address] [facility] into epic for IMM letter #2 and original paper copy has been added to the paper chart at the nurses station. IMM Letter given to Patient/Family/Significant other/Guardian/POA/by[de-identified] Kailyn Choi  IMM Letter date given[de-identified] 12/21/23  IMM Letter time given[de-identified] 8479    Transportation:  Transportation PLAN for discharge: family   Mode of Transport: Private Car  Reason for medical transport: Not Applicable  Name of [address] [place] [address] Road: Not Applicable  Time of Transport: TBD    Additional CM Notes: Patient has been cleared for d/c to home. Her medications are paid for and in the room. I called her son Clearance Reeks and he plans on coming over around 7-8 tonight. COVID Result:    Lab Results   Component Value Date/Time    COVID19 NOT DETECTED 12/10/2023 02:30 AM       The Plan for Transition of Care is related to the following treatment goals of Agitation [R45.1]  ABNER (acute kidney injury) ([address]) [N17.9]  Malfunction of nephrostomy tube ([address]) [W03.199J]    The Patient and/or patient representative Portia and her family were provided with a choice of provider and agrees with the discharge plan Yes    Freedom of choice list was provided with basic dialogue that supports the patient's individualized plan of care/goals and shares the quality data associated with the providers.  Yes    Care Transitions patient: No    Kailyn Choi RN  The [address] [place] [address]  Case Management Department  Ph: [phone]  Fax: [phone]

## 2023-12-22 NOTE — PROGRESS NOTES
V2.0    [facility] [place]      Name:  Stephanie Walters /Age/Sex: 1940  (78 y.o. female)   MRN & CSN:  [id number] & [id number] Encounter Date/Time: 2023 1:57 PM EST   Location:  [facility]- PCP: Romi Wyman MD     Attending:Gonzalo Gibbs, [facility] Day: 6    Assessment and Recommendations   Stephanie Walters is a 78 y.o. female with history of bladder cancer, vesicular cutaneous fistula, hypertension, CKD stage IIIb, nephrolithiasis, hydronephrosis status post right nephrostomy tube placement admitted for altered mental status. Assessment and plan:   Acute metabolic encephalopathy due to complicated UTI  Complicated UTI. Antibiotics per ID. On Levaquin, fluconazole. X 3 more days. Acute delirium versus dementia with agitation. Sitter at bedside, frequent cueing. Currently on Zyprexa. Palliative consult input noted. Acute on chronic kidney disease stage IIIb. Obstructive uropathy status post right nephrostomy tube. Urology input noted. Diet ADULT DIET; Regular   DVT Prophylaxis [] Lovenox, []  Heparin, [] SCDs, [] Ambulation,  [] Eliquis, [] Xarelto  [] Coumadin   Code Status Full Code   Disposition From: Home. Expected Disposition: home  Estimated Date of Discharge: 1 to 2 days  Patient requires continued admission due to altered mental status, placement   Surrogate Decision Maker/ POA Family     Personally reviewed Lab Studies and Imaging         Subjective:     Chief Complaint: Altered mental status    Stephanie Walters is a 78 y.o. female who presents with altered mental status    Examined today. No acute distress. Nephrostomy tube draining clear urine. Review of Systems:      Pertinent positives and negatives discussed in HPI    Objective:      Intake/Output Summary (Last 24 hours) at 2023 1357  Last data filed at 2023 1000  Gross per 24 hour   Intake 660 ml   Output 125 ml   Net 535 ml      Vitals:   Vitals:    23 2321 23 0346 23 0741 12/22/23 1126   BP: 102/60 114/60 107/61 113/74   Pulse: 96 59 74 75   Resp: 16 16 16 16   Temp: 97.8 °F (36.6 °C) 97.2 °F (36.2 °C)  97.9 °F (36.6 °C)   TempSrc: Axillary Axillary  Oral   SpO2: 97% 97%  98%   Weight:       Height:             Physical Exam:      General: NAD  Eyes: EOMI  ENT: neck supple  Cardiovascular: Regular rate.  Respiratory: Clear to auscultation  Gastrointestinal: Soft, non tender  Genitourinary: no suprapubic tenderness  Musculoskeletal: No edema  Skin: warm, dry  Neuro: Alert.  Psych: Mood appropriate.         Medications:   Medications:    fluconazole  100 mg Oral Daily    levoFLOXacin  250 mg Oral Daily    vitamin C  500 mg Oral Daily    therapeutic multivitamin-minerals  1 tablet Oral Daily    sodium chloride flush  5-40 mL IntraVENous 2 times per day    heparin (porcine)  5,000 Units SubCUTAneous BID      Infusions:    sodium chloride       PRN Meds: haloperidol, 0.25 mg, TID PRN   Or  haloperidol lactate, 0.5 mg, BID PRN  melatonin, 4.5 mg, Nightly PRN  sodium chloride flush, 5-40 mL, PRN  sodium chloride, , PRN  ondansetron, 4 mg, Q8H PRN   Or  ondansetron, 4 mg, Q6H PRN  acetaminophen, 650 mg, Q6H PRN   Or  acetaminophen, 650 mg, Q6H PRN        Labs and Imaging   IR GUIDED NEPHROSTOMY CATH EXCHANGE    Result Date: 12/19/2023  IR GUIDED NEPHROSTOMY CATH EXCHANGE Indication: Ureteral stricture. Patient cut existing nephrostomy tube with scissors. : Dr. Kristian Sanchez Technique: Individualized dose optimization technique was used in order to meet ALARA standards for radiation dose reduction.  In addition to vendor specific dose reduction algorithms, the dose reduction techniques vary based on the specific scanner utilized but frequently include automated exposure control, adjustment of the mA and/or kV according to patient size, and use of iterative reconstruction technique. Procedure: The patient was advised of the benefits, risks, and alternatives of the procedure and  12/15/2023 12:42 PM    BLOODU SMALL 12/15/2023 12:42 PM    GLUCOSEU Negative 12/15/2023 12:42 PM    KETUA Negative 12/15/2023 12:42 PM     Urine Cultures:   Lab Results   Component Value Date/Time    LABURIN >100,000 CFU/ml  No further workup   12/10/2023 10:33 AM     Blood Cultures:   Lab Results   Component Value Date/Time    BC No Growth after 4 days of incubation.  08/02/2023 09:07 AM     No results found for: \"BLOODCULT2\"  Organism:   Lab Results   Component Value Date/Time    ORG Candida lusitaniae 12/10/2023 10:33 AM         Electronically signed by Matt Vilchis MD on 12/22/2023 at 1:57 PM

## 2023-12-23 VITALS
RESPIRATION RATE: 16 BRPM | WEIGHT: 106.04 LBS | OXYGEN SATURATION: 94 % | SYSTOLIC BLOOD PRESSURE: 126 MMHG | DIASTOLIC BLOOD PRESSURE: 69 MMHG | BODY MASS INDEX: 20.82 KG/M2 | HEIGHT: 60 IN | HEART RATE: 74 BPM | TEMPERATURE: 97.8 F

## 2023-12-23 PROCEDURE — 6360000002 HC RX W HCPCS: Performed by: INTERNAL MEDICINE

## 2023-12-23 PROCEDURE — 6370000000 HC RX 637 (ALT 250 FOR IP): Performed by: INTERNAL MEDICINE

## 2023-12-23 PROCEDURE — 2580000003 HC RX 258: Performed by: INTERNAL MEDICINE

## 2023-12-23 PROCEDURE — 6370000000 HC RX 637 (ALT 250 FOR IP): Performed by: SURGERY

## 2023-12-23 RX ADMIN — LEVOFLOXACIN 250 MG: 500 TABLET, FILM COATED ORAL at 08:59

## 2023-12-23 RX ADMIN — Medication 1 TABLET: at 09:00

## 2023-12-23 RX ADMIN — SODIUM CHLORIDE, PRESERVATIVE FREE 10 ML: 5 INJECTION INTRAVENOUS at 09:12

## 2023-12-23 RX ADMIN — HEPARIN SODIUM 5000 UNITS: 5000 INJECTION INTRAVENOUS; SUBCUTANEOUS at 09:10

## 2023-12-23 RX ADMIN — OXYCODONE HYDROCHLORIDE AND ACETAMINOPHEN 500 MG: 500 TABLET ORAL at 08:58

## 2023-12-23 RX ADMIN — FLUCONAZOLE 100 MG: 200 TABLET ORAL at 08:57

## 2023-12-23 NOTE — DISCHARGE SUMMARY
V2.0  Discharge Summary    Name:  Phoebe Hernandez /Age/Sex: 1940 (78 y.o. female)   Admit Date: 2023  Discharge Date: 23    MRN & CSN:  [id number] & [id number] Encounter Date and Time 23 3:40 PM EST    Attending:  No att. providers found Discharging Provider: Ron Zhu MD       Hospital Course:     Brief HPI: Phoebe Hernandez is a 78 y.o. female who presented with   Phoebe Hernandez is a 78 y.o. female with history of bladder cancer, vesicular cutaneous fistula, hypertension, CKD stage IIIb, nephrolithiasis, hydronephrosis status post right nephrostomy tube placement admitted for altered mental status. She underwent treatment with IV antibiotics for complicated UTI. Seen by infectious disease consult. Recommended Levaquin on discharge to complete 10 days course. Seen by urology. Right nephrostomy tube exchanged. Follow-up with outpatient urology. Her mental status improved back to baseline. Family declined placement. Son wants to take him home. Brief Problem Based Course:   Acute metabolic encephalopathy due to complicated UTI  Complicated UTI. Antibiotics per ID. On Levaquin, fluconazole. X 3 more days. Acute delirium versus dementia with agitation. Sitter at bedside, frequent cueing. Currently on Zyprexa. Palliative consult input noted. Acute on chronic kidney disease stage IIIb. Obstructive uropathy status post right nephrostomy tube. Urology input noted. The patient expressed appropriate understanding of, and agreement with the discharge recommendations, medications, and plan.      Consults this admission:  IP CONSULT TO UROLOGY  IP CONSULT TO PALLIATIVE CARE  IP CONSULT TO PSYCHIATRY  IP CONSULT TO INFECTIOUS DISEASES    Discharge Diagnosis:   ABNER (acute kidney injury) [facility])        Discharge Instruction:   Follow up appointments:   Primary care physician: Rebecca Pimentel MD within 2 weeks  Diet: regular diet   Activity: activity as 08/02/2023 09:07 AM     TSH: No results found for: \"TSH\"  Troponin: No results found for: \"TROPONINT\"  Lactic Acid: No results for input(s): \"LACTA\" in the last 72 hours. BNP: No results for input(s): \"PROBNP\" in the last 72 hours. UA:  Lab Results   Component Value Date/Time    NITRU Negative 12/15/2023 12:42 PM    COLORU Yellow 12/15/2023 12:42 PM    PHUR 6.0 12/15/2023 12:42 PM    WBCUA 6-9 12/15/2023 12:42 PM    RBCUA 0-2 12/15/2023 12:42 PM    MUCUS 2+ 11/13/2023 12:02 PM    YEAST Present 12/15/2023 12:42 PM    BACTERIA Rare 12/15/2023 12:42 PM    CLARITYU Clear 12/15/2023 12:42 PM    SPECGRAV >=1.030 12/15/2023 12:42 PM    LEUKOCYTESUR MODERATE 12/15/2023 12:42 PM    UROBILINOGEN 0.2 12/15/2023 12:42 PM    BILIRUBINUR Negative 12/15/2023 12:42 PM    BLOODU SMALL 12/15/2023 12:42 PM    GLUCOSEU Negative 12/15/2023 12:42 PM    KETUA Negative 12/15/2023 12:42 PM     Urine Cultures:   Lab Results   Component Value Date/Time    LABURIN >100,000 CFU/ml  No further workup   12/10/2023 10:33 AM     Blood Cultures:   Lab Results   Component Value Date/Time    BC No Growth after 4 days of incubation.  08/02/2023 09:07 AM     No results found for: \"BLOODCULT2\"  Organism:   Lab Results   Component Value Date/Time    ORG Candida lusitaniae 12/10/2023 10:33 AM       Time Spent Discharging patient 32 minutes    Electronically signed by Reyna Ricci MD on 12/23/2023 at 3:40 PM

## 2023-12-23 NOTE — PROGRESS NOTES
Sitter in place  Pt refused most vitals, stating \"No\" when approaching with vitals machine  Otherwise cooperative  Resting eyes closed during shift

## 2023-12-23 NOTE — PROGRESS NOTES
Pt discharged home with son. Secured nephrostomy & suprapubic to leg. IV removed. All belongings including prescriptions from meds to beds with son. Educated patient & son, all questions answered. Pt to lobby via wheelchair with PCA.     Electronically signed by Perez Blake RN on 12/23/2023 at 10:24 AM

## 2024-01-09 NOTE — PROGRESS NOTES
Physician Progress Note      PATIENT:               AGATA DAO  [facility] #:                  [id number]  :                       1940  ADMIT DATE:       2023 4:05 AM  DISCH DATE:        2023 10:21 AM  RESPONDING  PROVIDER #:        Jamal Gibbs MD          QUERY TEXT:    Pt admitted with Metabolic encephalopathy 2/2 complicated UTI.  Noted   documentation of displaced nephrostomy tube.  If possible, please document in   progress notes and discharge summary the relationship if any between the UTI   and the nephrostomy tube:    The medical record reflects the following:  Risk Factors: 83 y.o. female with hx of bladder cancer, vesicular cutaneous   fistula, HTN, CKD 3b, nephrolithiasis, hydronephrosis  Clinical Indicators: Presented with AMS.  Per ED: presents to the emergency   department for a damage nephrostomy tube. Pt was confused and had damaged her   nephrostomy tube and the bag was missing.  Treatment: Urology consult, Rt nephrostomy tube exchanged, IV Abx.  Options provided:  -- UTI is likely due to the nephrostomy tube  -- UTI is not likely due to the nephrostomy tube  -- Other - I will add my own diagnosis  -- Disagree - Not applicable / Not valid  -- Disagree - Clinically unable to determine / Unknown  -- Refer to Clinical Documentation Reviewer    PROVIDER RESPONSE TEXT:    Patient has UTI likely due to the nephrostomy tube.    Query created by: Devon Johnson on 2023 8:08 PM      QUERY TEXT:    Patient admitted with Metabolic encephalopathy 2/2 complicated UTI.  Per    Query answer by attending ABNER was ruled out.  Per DC Summary, Discharge dx   was ABNER (ABNER on CKD IIIb.  If possible, please document in progress notes and discharge summary if ABNER is   ruled in or out:    The medical record reflects the following:  Risk Factors: 83 y.o. female with PMH bladder cancer, vesicular fistula, HTN,   CKD 3B, nephrolithiasis, hydronephrosis, s/p nephrostomy tube

## 2024-01-17 ENCOUNTER — HOSPITAL ENCOUNTER (EMERGENCY)
Age: 84
Discharge: HOME | End: 2024-01-17
Payer: MEDICARE

## 2024-01-17 VITALS
DIASTOLIC BLOOD PRESSURE: 72 MMHG | RESPIRATION RATE: 16 BRPM | HEART RATE: 108 BPM | SYSTOLIC BLOOD PRESSURE: 162 MMHG | OXYGEN SATURATION: 96 % | TEMPERATURE: 97.9 F

## 2024-01-17 DIAGNOSIS — T83.9XXA PROBLEM WITH FOLEY CATHETER, INITIAL ENCOUNTER (HCC): Primary | ICD-10-CM

## 2024-01-17 PROCEDURE — 99282 EMERGENCY DEPT VISIT SF MDM: CPT

## 2024-01-17 ASSESSMENT — PAIN - FUNCTIONAL ASSESSMENT: PAIN_FUNCTIONAL_ASSESSMENT: NONE - DENIES PAIN

## 2024-01-17 NOTE — DISCHARGE INSTRUCTIONS
Please do not wear tight fitting pants because that may pull out your tubes and you will have to come back to the hospital.     Return back with any other concerns.

## 2024-01-17 NOTE — ED PROVIDER NOTES
nephrostomy bag at son's request.  Both are draining appropriately.  No indication for further workup today from the emergency department.  Son did endorse frustrations with ongoing care with his mother at home.  During last admission, patient was evaluated by social work and discussion of placement was had.  Son states that he is not ready for that and he does have information to consult for home health care.  States that his sister is coming into town next week and she will hopefully be able to help convince the patient that home health care is necessary and they will begin the process at that time.  No other needs identified at this time.  Discharged in stable condition with return precautions    Is this patient to be included in the SEP-1 core measure? No Exclusion criteria - the patient is NOT to be included for SEP-1 Core Measure due to: Infection is not suspected    Medical Decision Making  Amount and/or Complexity of Data Reviewed  Independent Historian: caregiver  External Data Reviewed: notes.        This patient was also evaluated independently by the advanced practice    Clinical Impression     1. Problem with Moreno catheter, initial encounter (HCC)        Disposition     PATIENT REFERRED TO:  No follow-up provider specified.    DISCHARGE MEDICATIONS:  New Prescriptions    No medications on file       DISPOSITION Decision To Discharge 01/17/2024 09:42:35 AM        Diagnostic Results and Other Data     RADIOLOGY:  No orders to display       LABS:   No results found for this visit on 01/17/24.  EKG   none    ED BEDSIDE ULTRASOUND:  No results found.    RECENT VITALS:  BP: (!) 162/72, Temp: 97.9 °F (36.6 °C), Pulse: (!) 108, Respirations: 16, SpO2: 96 %     Procedures     none    ED Course     Nursing Notes, Past Medical Hx,Past Surgical Hx, Social Hx, Allergies, and Family Hx were reviewed.         The patient was given the following medications:  No orders of the defined types were placed in this

## 2024-01-17 NOTE — ED NOTES
Nephrostomy bag replaced. Caregiver given instructions on use. Verbalized understanding and d/c instructions.      Ruchi Pereira, RN  01/17/24 6717

## 2024-02-24 ENCOUNTER — HOSPITAL ENCOUNTER (EMERGENCY)
Age: 84
Discharge: HOME | End: 2024-02-24
Attending: STUDENT IN AN ORGANIZED HEALTH CARE EDUCATION/TRAINING PROGRAM
Payer: MEDICARE

## 2024-02-24 VITALS
RESPIRATION RATE: 18 BRPM | BODY MASS INDEX: 20.9 KG/M2 | WEIGHT: 107 LBS | TEMPERATURE: 97.8 F | DIASTOLIC BLOOD PRESSURE: 82 MMHG | OXYGEN SATURATION: 100 % | SYSTOLIC BLOOD PRESSURE: 151 MMHG | HEART RATE: 98 BPM

## 2024-02-24 DIAGNOSIS — T83.011A MALFUNCTION OF FOLEY CATHETER, INITIAL ENCOUNTER (HCC): Primary | ICD-10-CM

## 2024-02-24 PROCEDURE — 99283 EMERGENCY DEPT VISIT LOW MDM: CPT

## 2024-02-24 PROCEDURE — 51702 INSERT TEMP BLADDER CATH: CPT

## 2024-02-24 ASSESSMENT — PAIN - FUNCTIONAL ASSESSMENT: PAIN_FUNCTIONAL_ASSESSMENT: NONE - DENIES PAIN

## 2024-02-25 NOTE — ED PROVIDER NOTES
THE [facility]  EMERGENCY DEPARTMENT ENCOUNTER          ATTENDING PHYSICIAN NOTE       Date of evaluation: 2/24/2024    Chief Complaint     Urinary Catheter (Urinary catheter fell out )      History of Present Illness     Portia Waggoner is a 84 y.o. female who presents with displaced Moreno catheter.  Patient has a right-sided nephrostomy tube as well as a Moreno catheter.  At some point tonight her Moreno catheter got got displaced.  She presents today for placement.  Patient presents with her son who helps provide this history.  Neither the patient nor the son have any further complaints at this time.      Physical Exam     INITIAL VITALS: BP: (!) 151/82, Temp: 97.8 °F (36.6 °C), Pulse: 98, Respirations: 18, SpO2: 100 %   Physical Exam  Vitals and nursing note reviewed.   Constitutional:       General: She is not in acute distress.     Appearance: Normal appearance. She is normal weight.   HENT:      Head: Normocephalic and atraumatic.   Cardiovascular:      Rate and Rhythm: Normal rate.   Abdominal:      General: Abdomen is flat.      Tenderness: There is no abdominal tenderness. There is no guarding or rebound.      Comments: Right-sided nephrostomy site clean dry and intact.   Skin:     General: Skin is warm and dry.   Neurological:      General: No focal deficit present.      Mental Status: She is alert. Mental status is at baseline.   Psychiatric:         Mood and Affect: Mood normal.         Behavior: Behavior normal.         ASSESSMENT / PLAN  (MEDICAL DECISION MAKING)     INITIAL VITALS: BP: (!) 151/82, Temp: 97.8 °F (36.6 °C), Pulse: 98, Respirations: 18, SpO2: 100 %      Portia Waggoner is a 84 y.o. female who presents for Moreno catheter replacement.  Moreno catheter was reinserted without difficulty.  Both Moreno catheter and nephrostomy tube are freely draining.  Nephrostomy site appears clean dry and intact.  Patient and her son deny any further complaints.  Stable for outpatient management..

## 2024-02-26 NOTE — PRE-PROCEDURE INSTRUCTIONS
Attempted to call patient about procedure. No answer. Voicemail left. Told to be here at 1130 for procedure at 1300. NPO after midnight, but can take morning medication with sips of water, office should have told them when and if they should stop any blood thinners. To have a responsible adult be with patient take them home and stay with them afterwards, if they are not admitted to hospital afterwards. And if available bring current list of medications.

## 2024-02-27 ENCOUNTER — HOSPITAL ENCOUNTER (OUTPATIENT)
Dept: INTERVENTIONAL RADIOLOGY/VASCULAR | Age: 84
Discharge: HOME | End: 2024-02-27

## 2024-03-20 NOTE — PROGRESS NOTES
Called patient about procedure. Spoke with patient's son. Told to be here at 1130 for procedure at 1300. Must be NPO after midnight but can take morning medication with sips of water. Patient stated they do not take a blood thinner. Told to have a responsible adult with them to take them home and stay with them afterwards, if they do not get admitted to hospital. Also, to bring a current list of medications. No other questions or concerns.

## 2024-03-21 ENCOUNTER — HOSPITAL ENCOUNTER (INPATIENT)
Age: 84
LOS: 3 days | Discharge: HOME | DRG: 698 | End: 2024-03-24
Attending: EMERGENCY MEDICINE | Admitting: INTERNAL MEDICINE
Payer: MEDICARE

## 2024-03-21 ENCOUNTER — HOSPITAL ENCOUNTER (OUTPATIENT)
Dept: INTERVENTIONAL RADIOLOGY/VASCULAR | Age: 84
Discharge: HOME | End: 2024-03-21
Payer: MEDICARE

## 2024-03-21 ENCOUNTER — APPOINTMENT (OUTPATIENT)
Dept: GENERAL RADIOLOGY | Age: 84
DRG: 698 | End: 2024-03-21
Payer: MEDICARE

## 2024-03-21 VITALS
OXYGEN SATURATION: 97 % | HEART RATE: 71 BPM | BODY MASS INDEX: 20.62 KG/M2 | DIASTOLIC BLOOD PRESSURE: 47 MMHG | HEIGHT: 60 IN | TEMPERATURE: 97.9 F | WEIGHT: 105 LBS | RESPIRATION RATE: 16 BRPM | SYSTOLIC BLOOD PRESSURE: 121 MMHG

## 2024-03-21 DIAGNOSIS — N39.0 UTI (URINARY TRACT INFECTION), BACTERIAL: ICD-10-CM

## 2024-03-21 DIAGNOSIS — N13.39 OTHER HYDRONEPHROSIS: ICD-10-CM

## 2024-03-21 DIAGNOSIS — A41.9 SEPTICEMIA (HCC): Primary | ICD-10-CM

## 2024-03-21 DIAGNOSIS — A49.9 UTI (URINARY TRACT INFECTION), BACTERIAL: ICD-10-CM

## 2024-03-21 LAB
ALBUMIN SERPL-MCNC: 3.9 G/DL (ref 3.4–5)
ALP SERPL-CCNC: 71 U/L (ref 40–129)
ALT SERPL-CCNC: 7 U/L (ref 10–40)
ANION GAP SERPL CALCULATED.3IONS-SCNC: 12 MMOL/L (ref 3–16)
AST SERPL-CCNC: 15 U/L (ref 15–37)
BACTERIA URNS QL MICRO: ABNORMAL /HPF
BASE EXCESS BLDV CALC-SCNC: -1.2 MMOL/L (ref -2–3)
BASOPHILS # BLD: 0 K/UL (ref 0–0.2)
BASOPHILS NFR BLD: 0.2 %
BILIRUB DIRECT SERPL-MCNC: <0.2 MG/DL (ref 0–0.3)
BILIRUB INDIRECT SERPL-MCNC: ABNORMAL MG/DL (ref 0–1)
BILIRUB SERPL-MCNC: <0.2 MG/DL (ref 0–1)
BILIRUB UR QL STRIP.AUTO: NEGATIVE
BUN SERPL-MCNC: 28 MG/DL (ref 7–20)
CALCIUM SERPL-MCNC: 8.8 MG/DL (ref 8.3–10.6)
CHLORIDE SERPL-SCNC: 107 MMOL/L (ref 99–110)
CLARITY UR: CLEAR
CO2 BLDV-SCNC: 24 MMOL/L
CO2 SERPL-SCNC: 21 MMOL/L (ref 21–32)
COHGB MFR BLDV: 1.3 % (ref 0–1.5)
COLOR UR: YELLOW
CREAT SERPL-MCNC: 1.7 MG/DL (ref 0.6–1.2)
DEPRECATED RDW RBC AUTO: 15.8 % (ref 12.4–15.4)
DO-HGB MFR BLDV: 11.2 %
EOSINOPHIL # BLD: 0.1 K/UL (ref 0–0.6)
EOSINOPHIL NFR BLD: 0.6 %
EPI CELLS #/AREA URNS HPF: ABNORMAL /HPF (ref 0–5)
FLUAV RNA RESP QL NAA+PROBE: NOT DETECTED
FLUBV RNA RESP QL NAA+PROBE: NOT DETECTED
GFR SERPLBLD CREATININE-BSD FMLA CKD-EPI: 29 ML/MIN/{1.73_M2}
GLUCOSE SERPL-MCNC: 120 MG/DL (ref 70–99)
GLUCOSE UR STRIP.AUTO-MCNC: NEGATIVE MG/DL
HCO3 BLDV-SCNC: 23 MMOL/L (ref 24–28)
HCT VFR BLD AUTO: 33.1 % (ref 36–48)
HGB BLD-MCNC: 10.9 G/DL (ref 12–16)
HGB UR QL STRIP.AUTO: ABNORMAL
KETONES UR STRIP.AUTO-MCNC: ABNORMAL MG/DL
LACTATE BLDV-SCNC: 1.7 MMOL/L (ref 0.4–1.9)
LACTATE BLDV-SCNC: 2.5 MMOL/L (ref 0.4–1.9)
LEUKOCYTE ESTERASE UR QL STRIP.AUTO: ABNORMAL
LIPASE SERPL-CCNC: 46 U/L (ref 13–60)
LYMPHOCYTES # BLD: 0.5 K/UL (ref 1–5.1)
LYMPHOCYTES NFR BLD: 3.8 %
MCH RBC QN AUTO: 29.2 PG (ref 26–34)
MCHC RBC AUTO-ENTMCNC: 32.9 G/DL (ref 31–36)
MCV RBC AUTO: 88.8 FL (ref 80–100)
METHGB MFR BLDV: <0 % (ref 0–1.5)
MONOCYTES # BLD: 0.5 K/UL (ref 0–1.3)
MONOCYTES NFR BLD: 3.9 %
NEUTROPHILS # BLD: 11.5 K/UL (ref 1.7–7.7)
NEUTROPHILS NFR BLD: 91.5 %
NITRITE UR QL STRIP.AUTO: POSITIVE
PCO2 BLDV: 35.9 MMHG (ref 41–51)
PH BLDV: 7.42 [PH] (ref 7.35–7.45)
PH UR STRIP.AUTO: 6.5 [PH] (ref 5–8)
PLATELET # BLD AUTO: 206 K/UL (ref 135–450)
PMV BLD AUTO: 7.1 FL (ref 5–10.5)
PO2 BLDV: 53.7 MMHG (ref 25–40)
POTASSIUM SERPL-SCNC: 4.4 MMOL/L (ref 3.5–5.1)
PROT SERPL-MCNC: 7.1 G/DL (ref 6.4–8.2)
PROT UR STRIP.AUTO-MCNC: 30 MG/DL
RBC # BLD AUTO: 3.73 M/UL (ref 4–5.2)
RBC #/AREA URNS HPF: ABNORMAL /HPF (ref 0–4)
REASON FOR REJECTION: NORMAL
REJECTED TEST: NORMAL
SAO2 % BLDV: 89 %
SARS-COV-2 RNA RESP QL NAA+PROBE: NOT DETECTED
SODIUM SERPL-SCNC: 140 MMOL/L (ref 136–145)
SP GR UR STRIP.AUTO: 1.02 (ref 1–1.03)
UA COMPLETE W REFLEX CULTURE PNL UR: YES
UA DIPSTICK W REFLEX MICRO PNL UR: YES
URN SPEC COLLECT METH UR: ABNORMAL
UROBILINOGEN UR STRIP-ACNC: 0.2 E.U./DL
WBC # BLD AUTO: 12.6 K/UL (ref 4–11)
WBC #/AREA URNS HPF: >100 /HPF (ref 0–5)

## 2024-03-21 PROCEDURE — 85025 COMPLETE CBC W/AUTO DIFF WBC: CPT

## 2024-03-21 PROCEDURE — 87636 SARSCOV2 & INF A&B AMP PRB: CPT

## 2024-03-21 PROCEDURE — 6360000002 HC RX W HCPCS: Performed by: INTERNAL MEDICINE

## 2024-03-21 PROCEDURE — 96365 THER/PROPH/DIAG IV INF INIT: CPT

## 2024-03-21 PROCEDURE — 1200000000 HC SEMI PRIVATE

## 2024-03-21 PROCEDURE — 81001 URINALYSIS AUTO W/SCOPE: CPT

## 2024-03-21 PROCEDURE — 2500000003 HC RX 250 WO HCPCS

## 2024-03-21 PROCEDURE — 6360000004 HC RX CONTRAST MEDICATION: Performed by: STUDENT IN AN ORGANIZED HEALTH CARE EDUCATION/TRAINING PROGRAM

## 2024-03-21 PROCEDURE — 83690 ASSAY OF LIPASE: CPT

## 2024-03-21 PROCEDURE — 83605 ASSAY OF LACTIC ACID: CPT

## 2024-03-21 PROCEDURE — 50435 EXCHANGE NEPHROSTOMY CATH: CPT

## 2024-03-21 PROCEDURE — 96361 HYDRATE IV INFUSION ADD-ON: CPT

## 2024-03-21 PROCEDURE — 99285 EMERGENCY DEPT VISIT HI MDM: CPT

## 2024-03-21 PROCEDURE — 87040 BLOOD CULTURE FOR BACTERIA: CPT

## 2024-03-21 PROCEDURE — 6360000002 HC RX W HCPCS

## 2024-03-21 PROCEDURE — 36415 COLL VENOUS BLD VENIPUNCTURE: CPT

## 2024-03-21 PROCEDURE — 6360000002 HC RX W HCPCS: Performed by: EMERGENCY MEDICINE

## 2024-03-21 PROCEDURE — 87186 SC STD MICRODIL/AGAR DIL: CPT

## 2024-03-21 PROCEDURE — 2580000003 HC RX 258: Performed by: INTERNAL MEDICINE

## 2024-03-21 PROCEDURE — 80048 BASIC METABOLIC PNL TOTAL CA: CPT

## 2024-03-21 PROCEDURE — 0T25X0Z CHANGE DRAINAGE DEVICE IN KIDNEY, EXTERNAL APPROACH: ICD-10-PCS | Performed by: UROLOGY

## 2024-03-21 PROCEDURE — C1729 CATH, DRAINAGE: HCPCS

## 2024-03-21 PROCEDURE — 87086 URINE CULTURE/COLONY COUNT: CPT

## 2024-03-21 PROCEDURE — 2580000003 HC RX 258: Performed by: EMERGENCY MEDICINE

## 2024-03-21 PROCEDURE — 82803 BLOOD GASES ANY COMBINATION: CPT

## 2024-03-21 PROCEDURE — 99152 MOD SED SAME PHYS/QHP 5/>YRS: CPT

## 2024-03-21 PROCEDURE — 87077 CULTURE AEROBIC IDENTIFY: CPT

## 2024-03-21 PROCEDURE — 71045 X-RAY EXAM CHEST 1 VIEW: CPT

## 2024-03-21 PROCEDURE — 80076 HEPATIC FUNCTION PANEL: CPT

## 2024-03-21 RX ORDER — ONDANSETRON 2 MG/ML
4 INJECTION INTRAMUSCULAR; INTRAVENOUS EVERY 6 HOURS PRN
Status: DISCONTINUED | OUTPATIENT
Start: 2024-03-21 | End: 2024-03-24 | Stop reason: HOSPADM

## 2024-03-21 RX ORDER — LANOLIN ALCOHOL/MO/W.PET/CERES
3 CREAM (GRAM) TOPICAL DAILY
Status: DISCONTINUED | OUTPATIENT
Start: 2024-03-22 | End: 2024-03-21

## 2024-03-21 RX ORDER — QUETIAPINE FUMARATE 25 MG/1
25 TABLET, FILM COATED ORAL NIGHTLY
Status: DISCONTINUED | OUTPATIENT
Start: 2024-03-22 | End: 2024-03-22

## 2024-03-21 RX ORDER — QUETIAPINE FUMARATE 25 MG/1
25 TABLET, FILM COATED ORAL
COMMUNITY
End: 2024-03-24

## 2024-03-21 RX ORDER — SODIUM CHLORIDE 0.9 % (FLUSH) 0.9 %
5-40 SYRINGE (ML) INJECTION PRN
Status: DISCONTINUED | OUTPATIENT
Start: 2024-03-21 | End: 2024-03-24 | Stop reason: HOSPADM

## 2024-03-21 RX ORDER — ACETAMINOPHEN 500 MG
500 TABLET ORAL EVERY 6 HOURS PRN
Status: DISCONTINUED | OUTPATIENT
Start: 2024-03-21 | End: 2024-03-22 | Stop reason: SDUPTHER

## 2024-03-21 RX ORDER — ACETAMINOPHEN 650 MG/1
650 SUPPOSITORY RECTAL EVERY 6 HOURS PRN
Status: DISCONTINUED | OUTPATIENT
Start: 2024-03-21 | End: 2024-03-24 | Stop reason: HOSPADM

## 2024-03-21 RX ORDER — ACETAMINOPHEN 325 MG/1
650 TABLET ORAL EVERY 6 HOURS PRN
Status: DISCONTINUED | OUTPATIENT
Start: 2024-03-21 | End: 2024-03-24 | Stop reason: HOSPADM

## 2024-03-21 RX ORDER — CHOLECALCIFEROL (VITAMIN D3) 125 MCG
5 CAPSULE ORAL NIGHTLY PRN
Status: DISCONTINUED | OUTPATIENT
Start: 2024-03-21 | End: 2024-03-21

## 2024-03-21 RX ORDER — SODIUM CHLORIDE 0.9 % (FLUSH) 0.9 %
5-40 SYRINGE (ML) INJECTION EVERY 12 HOURS SCHEDULED
Status: DISCONTINUED | OUTPATIENT
Start: 2024-03-21 | End: 2024-03-24 | Stop reason: HOSPADM

## 2024-03-21 RX ORDER — SODIUM CHLORIDE 9 MG/ML
INJECTION, SOLUTION INTRAVENOUS PRN
Status: DISCONTINUED | OUTPATIENT
Start: 2024-03-21 | End: 2024-03-24 | Stop reason: HOSPADM

## 2024-03-21 RX ORDER — ENOXAPARIN SODIUM 100 MG/ML
30 INJECTION SUBCUTANEOUS DAILY
Status: DISCONTINUED | OUTPATIENT
Start: 2024-03-22 | End: 2024-03-24 | Stop reason: HOSPADM

## 2024-03-21 RX ORDER — 0.9 % SODIUM CHLORIDE 0.9 %
30 INTRAVENOUS SOLUTION INTRAVENOUS ONCE
Status: COMPLETED | OUTPATIENT
Start: 2024-03-21 | End: 2024-03-21

## 2024-03-21 RX ORDER — ACETAMINOPHEN 500 MG
500 TABLET ORAL EVERY 6 HOURS PRN
COMMUNITY

## 2024-03-21 RX ORDER — BISACODYL 5 MG/1
5 TABLET, DELAYED RELEASE ORAL DAILY PRN
Status: DISCONTINUED | OUTPATIENT
Start: 2024-03-21 | End: 2024-03-24 | Stop reason: HOSPADM

## 2024-03-21 RX ORDER — QUETIAPINE FUMARATE 25 MG/1
25 TABLET, FILM COATED ORAL NIGHTLY
Status: DISCONTINUED | OUTPATIENT
Start: 2024-03-22 | End: 2024-03-24 | Stop reason: HOSPADM

## 2024-03-21 RX ORDER — ONDANSETRON 4 MG/1
4 TABLET, ORALLY DISINTEGRATING ORAL EVERY 8 HOURS PRN
Status: DISCONTINUED | OUTPATIENT
Start: 2024-03-21 | End: 2024-03-24 | Stop reason: HOSPADM

## 2024-03-21 RX ORDER — NITROFURANTOIN 25; 75 MG/1; MG/1
100 CAPSULE ORAL
COMMUNITY

## 2024-03-21 RX ORDER — SODIUM CHLORIDE, SODIUM LACTATE, POTASSIUM CHLORIDE, CALCIUM CHLORIDE 600; 310; 30; 20 MG/100ML; MG/100ML; MG/100ML; MG/100ML
INJECTION, SOLUTION INTRAVENOUS CONTINUOUS
Status: DISCONTINUED | OUTPATIENT
Start: 2024-03-21 | End: 2024-03-23

## 2024-03-21 RX ADMIN — SODIUM CHLORIDE, PRESERVATIVE FREE 10 ML: 5 INJECTION INTRAVENOUS at 22:58

## 2024-03-21 RX ADMIN — CEFTRIAXONE 1000 MG: 1 INJECTION, POWDER, FOR SOLUTION INTRAMUSCULAR; INTRAVENOUS at 19:25

## 2024-03-21 RX ADMIN — SODIUM CHLORIDE 1545 ML: 9 INJECTION, SOLUTION INTRAVENOUS at 19:25

## 2024-03-21 RX ADMIN — IOPAMIDOL 100 ML: 755 INJECTION, SOLUTION INTRAVENOUS at 14:39

## 2024-03-21 RX ADMIN — CEFEPIME 2000 MG: 2 INJECTION, POWDER, FOR SOLUTION INTRAVENOUS at 21:55

## 2024-03-21 RX ADMIN — SODIUM CHLORIDE, POTASSIUM CHLORIDE, SODIUM LACTATE AND CALCIUM CHLORIDE: 600; 310; 30; 20 INJECTION, SOLUTION INTRAVENOUS at 22:59

## 2024-03-21 ASSESSMENT — PAIN SCALES - PAIN ASSESSMENT IN ADVANCED DEMENTIA (PAINAD)
TOTALSCORE: 0
CONSOLABILITY: NO NEED TO CONSOLE
BREATHING: NORMAL
FACIALEXPRESSION: SMILING OR INEXPRESSIVE
BODYLANGUAGE: RELAXED

## 2024-03-21 ASSESSMENT — PAIN - FUNCTIONAL ASSESSMENT: PAIN_FUNCTIONAL_ASSESSMENT: PAIN ASSESSMENT IN ADVANCED DEMENTIA (PAINAD)

## 2024-03-21 NOTE — DISCHARGE INSTRUCTIONS
[facility]  Cardiovascular Special Procedures  Percutaneous Nephrostomy Insertion/Exchange  Patient Discharge Instructions                                                      Avoid strenuous activities like sports, lawn mowing, or lifting more than 10lbs.    Wear loose,comfortable clothing that won’t pull or kink the tube(s).     Check dressing often to make sure the tubing is securely taped in place to avoid pulling or kinking it.     You may shower unless otherwise instructed. Do not soak in a bathtub.  Securely cover the tube and insertion site with layers of saran wrap taped into place,then you may shower. Change dressing after you shower. Remove the saran wrap and dressing carefully so you don’t pull on the tube. Discard the dressing in plastic bag.            Gently clean around the insertion site with liquid soap (like Dial) and warm water,rinse thoroughly,and pat dry with a clean towel. Check the insertion site and skin area around it for any signs of increased redness, temperature, swelling, pain tenderness, bleeding, foul drainage,or urine leakage. Do not apply ointment or alchol to the site, just keep it clean and dry. Apply clean dressing.                                                                                                                                                                                                                                        The drainage bag should allow gravity drainage. Always make sure the tube is taped securely and do not let the drainage bag hang freely to pull the tube.  If the tubing has a white plastic stopcock, make sure it is open. Measure and record the amount and color of the urine and notify your doctor if the amount increases or decreases markedly.     Wash your hands before and after emptying the drainage bag. Empty the drainage bag when it is 1/2 to 2/3 full, and before going to bed.     Drink plenty of liquids,especially

## 2024-03-21 NOTE — BRIEF OP NOTE
Interventional Radiology Post Procedure    Date: 3/21/2024    Physician: Trevon Howard MD    Pre-op Diagnosis: right chronic PCN    Post-op Diagnosis: same    Variation from Planned Procedure: None       Findings: right PCN exchange without complication    Patient condition: Stable    Estimated Blood Loss: Minimal    Specimens:  None      Signed,  Trevon Howard MD  1:27 PM  3/21/2024

## 2024-03-21 NOTE — H&P
Present Diagnosis and Illness: 84 y.o. female who presents with chronic right PCN.    No diagnosis found.    No Known Allergies    Current Outpatient Medications   Medication Sig Dispense Refill    melatonin 5 MG TABS tablet Take 1 tablet by mouth nightly as needed (Insomnia) (Patient not taking: Reported on 3/21/2024) 30 tablet 1    melatonin (RA MELATONIN) 3 MG TABS tablet Take 1 tablet by mouth daily (Patient not taking: Reported on 3/21/2024) 30 tablet 0    Calcium Carbonate-Vitamin D (CALCIUM + D PO) Take by mouth (Patient not taking: Reported on 3/21/2024)      Ascorbic Acid (VITAMIN C) 500 MG tablet Take 1 tablet by mouth daily (Patient not taking: Reported on 3/21/2024)      Multiple Vitamins-Minerals (THERAPEUTIC MULTIVITAMIN-MINERALS) tablet Take 1 tablet by mouth daily       Current Facility-Administered Medications   Medication Dose Route Frequency Provider Last Rate Last Admin    ceFAZolin (ANCEF) 1,000 mg in sodium chloride 0.9 % 50 mL IVPB (mini-bag)  1,000 mg IntraVENous Once Trevon Howard MD            Past Medical History:   Diagnosis Date    Arthritis     Bladder cancer (HCC)     Chronic renal failure, stage 3 (moderate), unspecified whether stage 3a or 3b CKD (HCC)     Kidney stone     Osteoporosis     Senile dementia with behavioral disturbance (HCC)     Wears dentures     WEARS UPPER ONES CURRENTLY    Wears glasses     Wears hearing aid     BILATERAL       No family history on file.    Physical Examination:    Pulse 73, temperature 97.9 °F (36.6 °C), temperature source Oral, resp. rate 16, height 1.524 m (5'), weight 47.6 kg (105 lb), SpO2 100 %.    Head and neck: normal atraumatic, no neck masses, normal thyroid, no jvd  Chest: Normal  Heart: Regular rate and rhythm  Abdominal: soft, non-tender. Bowel sounds normal. No masses,  no organomegaly  Neurological: normal    Moderate Sedation Focused Evaluation:    NPO for 4 hours: yes    ASA 2 - Patient with mild systemic disease with no functional

## 2024-03-21 NOTE — PROGRESS NOTES
Cath Lab Pre Procedure Flowsheet    Plan of Care:     Hemodynamics and cardiac rhythm will remain stable.   Comfort level will be maintained.   Respiratory function will remain adequate.   Pt/family will verbalize understanding of the procedure.   Procedure will be tolerated without complications.   Patient will recover from procedure without complications.   ID armband on patient and identification verified.   Informed consent obtained.   Non invasive blood pressure cuff applied, monitoring initiated.   EKG pads and pulse oximeter applied, monitoring initiated.   Instructions given. Patient and / or family verbalize understanding.   H&P will be documented by physician in [facility].     Pre-procedure:    NPO Status: Pt has been NPO since midnight. .    Contrast / IV Dye Allergy:     Pregnancy Test: N/A.    Prep Sites: Wrist(s)  Chlorhexidine Scrub    Teddy's Test:    Pulses:     Anticoagulants: None.     Antiplatelets: None.     Chief Complaint:      Diabetic: No    Pre EKG Rhythm: nsr    Pre SBP:131/75    IV access: left a/c    Pre-procedure blood work collected by:     NIH Scale:

## 2024-03-21 NOTE — FLOWSHEET NOTE
1445- discharge instructions reviewed with patient and daughter , iv d/c'd , patient ambulated , patient discharged via wheelchair out the main entrance

## 2024-03-21 NOTE — ED PROVIDER NOTES
ED Attending Attestation Note     Date of evaluation: 3/21/2024    This patient was seen by the advance practice provider.  I have seen and examined the patient, agree with the workup, evaluation, management and diagnosis. The care plan has been discussed.  My assessment reveals presents to the emerged department having after having a nephrostomy tube removed.  She is also has a fistula in her left groin from a bladder fistula.  This was converted to a Moreno few days ago.  When she left here she was noted to have tremors and some rigor like movements.  Daughter is a nurse and emergency medicine in [place] that was with her.  She then began having nausea and vomiting.  Her temp was 100.1.  Patient really has no complaints.  The fistula site is seen without any active drainage or erythema.  She does have a leg bag in place.  A sepsis workup was obtained she received an order for antibiotics and lactic acid and 30 mL/kg fluids..     Sukhwinder Michaels MD  03/21/24 0272    
unspecified whether stage 3a or 3b CKD (HCC), Kidney stone, Osteoporosis, Senile dementia with behavioral disturbance (HCC), Wears dentures, Wears glasses, and Wears hearing aid.  She has a past surgical history that includes Bladder surgery (N/A); lymph node biopsy (Left, 2/28/2023); Kidney stone removal (Right, 8/1/2023); IR GUIDED URETERAL STENT PLACE THRU EXIST TRACT (8/15/2023); and IR GUIDED NEPHROSTOMY CATH PLACEMENT RIGHT (11/14/2023).  Her family history is not on file.  She reports that she has quit smoking. Her smoking use included cigarettes. She has never used smokeless tobacco. She reports current alcohol use of about 1.0 standard drink of alcohol per week. She reports that she does not use drugs.    Medications     Current Discharge Medication List        CONTINUE these medications which have NOT CHANGED    Details   QUEtiapine (SEROQUEL) 25 MG tablet Take 1 tablet by mouth Once nightly      nitrofurantoin, macrocrystal-monohydrate, (MACROBID) 100 MG capsule Take 1 capsule by mouth Once daily      acetaminophen (TYLENOL) 500 MG tablet Take 1 tablet by mouth every 6 hours as needed for Pain      !! melatonin 5 MG TABS tablet Take 1 tablet by mouth nightly as needed (Insomnia)  Qty: 30 tablet, Refills: 1      !! melatonin (RA MELATONIN) 3 MG TABS tablet Take 1 tablet by mouth daily  Qty: 30 tablet, Refills: 0      Calcium Carbonate-Vitamin D (CALCIUM + D PO) Take by mouth      Ascorbic Acid (VITAMIN C) 500 MG tablet Take 1 tablet by mouth daily      Multiple Vitamins-Minerals (THERAPEUTIC MULTIVITAMIN-MINERALS) tablet Take 1 tablet by mouth daily       !! - Potential duplicate medications found. Please discuss with provider.          Allergies     She has No Known Allergies.    Physical Exam     INITIAL VITALS: BP: 124/67, Temp: 100.1 °F (37.8 °C), Pulse: (!) 105, Respirations: 16, SpO2: 95 %  Physical Exam  Constitutional:       General: She is not in acute distress.     Appearance: She is

## 2024-03-22 LAB
ANION GAP SERPL CALCULATED.3IONS-SCNC: 12 MMOL/L (ref 3–16)
BASOPHILS # BLD: 0 K/UL (ref 0–0.2)
BASOPHILS NFR BLD: 0.2 %
BUN SERPL-MCNC: 22 MG/DL (ref 7–20)
CALCIUM SERPL-MCNC: 8.3 MG/DL (ref 8.3–10.6)
CHLORIDE SERPL-SCNC: 107 MMOL/L (ref 99–110)
CO2 SERPL-SCNC: 19 MMOL/L (ref 21–32)
CREAT SERPL-MCNC: 1.9 MG/DL (ref 0.6–1.2)
DEPRECATED RDW RBC AUTO: 16 % (ref 12.4–15.4)
EOSINOPHIL # BLD: 0.1 K/UL (ref 0–0.6)
EOSINOPHIL NFR BLD: 0.8 %
GFR SERPLBLD CREATININE-BSD FMLA CKD-EPI: 25 ML/MIN/{1.73_M2}
GLUCOSE SERPL-MCNC: 144 MG/DL (ref 70–99)
HCT VFR BLD AUTO: 33.1 % (ref 36–48)
HGB BLD-MCNC: 10.7 G/DL (ref 12–16)
LACTATE BLDV-SCNC: 1.8 MMOL/L (ref 0.4–2)
LACTATE BLDV-SCNC: 2.6 MMOL/L (ref 0.4–2)
LYMPHOCYTES # BLD: 1.8 K/UL (ref 1–5.1)
LYMPHOCYTES NFR BLD: 15.7 %
MCH RBC QN AUTO: 29.3 PG (ref 26–34)
MCHC RBC AUTO-ENTMCNC: 32.2 G/DL (ref 31–36)
MCV RBC AUTO: 91 FL (ref 80–100)
MONOCYTES # BLD: 0.7 K/UL (ref 0–1.3)
MONOCYTES NFR BLD: 6.6 %
NEUTROPHILS # BLD: 8.7 K/UL (ref 1.7–7.7)
NEUTROPHILS NFR BLD: 76.7 %
PLATELET # BLD AUTO: 157 K/UL (ref 135–450)
PMV BLD AUTO: 7.2 FL (ref 5–10.5)
POTASSIUM SERPL-SCNC: 4.2 MMOL/L (ref 3.5–5.1)
RBC # BLD AUTO: 3.64 M/UL (ref 4–5.2)
SODIUM SERPL-SCNC: 138 MMOL/L (ref 136–145)
WBC # BLD AUTO: 11.3 K/UL (ref 4–11)

## 2024-03-22 PROCEDURE — 6370000000 HC RX 637 (ALT 250 FOR IP): Performed by: INTERNAL MEDICINE

## 2024-03-22 PROCEDURE — 6360000002 HC RX W HCPCS: Performed by: INTERNAL MEDICINE

## 2024-03-22 PROCEDURE — 2060000000 HC ICU INTERMEDIATE R&B

## 2024-03-22 PROCEDURE — 94150 VITAL CAPACITY TEST: CPT

## 2024-03-22 PROCEDURE — 6370000000 HC RX 637 (ALT 250 FOR IP): Performed by: NURSE PRACTITIONER

## 2024-03-22 PROCEDURE — 80048 BASIC METABOLIC PNL TOTAL CA: CPT

## 2024-03-22 PROCEDURE — 36415 COLL VENOUS BLD VENIPUNCTURE: CPT

## 2024-03-22 PROCEDURE — 97162 PT EVAL MOD COMPLEX 30 MIN: CPT

## 2024-03-22 PROCEDURE — 97535 SELF CARE MNGMENT TRAINING: CPT

## 2024-03-22 PROCEDURE — 94761 N-INVAS EAR/PLS OXIMETRY MLT: CPT

## 2024-03-22 PROCEDURE — 83605 ASSAY OF LACTIC ACID: CPT

## 2024-03-22 PROCEDURE — 2580000003 HC RX 258: Performed by: INTERNAL MEDICINE

## 2024-03-22 PROCEDURE — 97116 GAIT TRAINING THERAPY: CPT

## 2024-03-22 PROCEDURE — 97530 THERAPEUTIC ACTIVITIES: CPT

## 2024-03-22 PROCEDURE — 97166 OT EVAL MOD COMPLEX 45 MIN: CPT

## 2024-03-22 PROCEDURE — 85025 COMPLETE CBC W/AUTO DIFF WBC: CPT

## 2024-03-22 RX ORDER — THIAMINE HYDROCHLORIDE 100 MG/ML
100 INJECTION, SOLUTION INTRAMUSCULAR; INTRAVENOUS DAILY
Status: DISCONTINUED | OUTPATIENT
Start: 2024-03-22 | End: 2024-03-24 | Stop reason: HOSPADM

## 2024-03-22 RX ORDER — LANOLIN ALCOHOL/MO/W.PET/CERES
3 CREAM (GRAM) TOPICAL NIGHTLY
Status: DISCONTINUED | OUTPATIENT
Start: 2024-03-22 | End: 2024-03-24 | Stop reason: HOSPADM

## 2024-03-22 RX ADMIN — SODIUM CHLORIDE, PRESERVATIVE FREE 10 ML: 5 INJECTION INTRAVENOUS at 20:42

## 2024-03-22 RX ADMIN — THIAMINE HYDROCHLORIDE 100 MG: 100 INJECTION, SOLUTION INTRAMUSCULAR; INTRAVENOUS at 20:42

## 2024-03-22 RX ADMIN — ACETAMINOPHEN 650 MG: 325 TABLET ORAL at 20:41

## 2024-03-22 RX ADMIN — QUETIAPINE FUMARATE 25 MG: 25 TABLET ORAL at 00:13

## 2024-03-22 RX ADMIN — SODIUM CHLORIDE, PRESERVATIVE FREE 10 ML: 5 INJECTION INTRAVENOUS at 10:30

## 2024-03-22 RX ADMIN — CEFEPIME 1000 MG: 1 INJECTION, POWDER, FOR SOLUTION INTRAMUSCULAR; INTRAVENOUS at 22:31

## 2024-03-22 RX ADMIN — QUETIAPINE FUMARATE 25 MG: 25 TABLET ORAL at 20:41

## 2024-03-22 RX ADMIN — ENOXAPARIN SODIUM 30 MG: 100 INJECTION SUBCUTANEOUS at 10:29

## 2024-03-22 RX ADMIN — ACETAMINOPHEN 650 MG: 325 TABLET ORAL at 06:33

## 2024-03-22 RX ADMIN — Medication 3 MG: at 20:41

## 2024-03-22 ASSESSMENT — PAIN SCALES - GENERAL: PAINLEVEL_OUTOF10: 3

## 2024-03-22 ASSESSMENT — PAIN DESCRIPTION - LOCATION: LOCATION: BACK

## 2024-03-22 ASSESSMENT — PAIN DESCRIPTION - PAIN TYPE: TYPE: CHRONIC PAIN

## 2024-03-22 ASSESSMENT — PAIN DESCRIPTION - ORIENTATION: ORIENTATION: LEFT

## 2024-03-22 ASSESSMENT — PAIN DESCRIPTION - DESCRIPTORS: DESCRIPTORS: ACHING

## 2024-03-22 ASSESSMENT — PAIN - FUNCTIONAL ASSESSMENT: PAIN_FUNCTIONAL_ASSESSMENT: ACTIVITIES ARE NOT PREVENTED

## 2024-03-22 ASSESSMENT — PAIN DESCRIPTION - ONSET: ONSET: PROGRESSIVE

## 2024-03-22 ASSESSMENT — PAIN DESCRIPTION - FREQUENCY: FREQUENCY: CONTINUOUS

## 2024-03-22 NOTE — H&P
Hospital Medicine History & Physical      PCP: Shahriar Hamm MD    Date of Admission: 3/21/2024    Date of Service: Pt seen/examined and Admitted with expected LOS greater than two midnights due to medical therapy.     Chief Complaint:      Rigors fever myalgia after having neurology procedure   History Of Present Illness:      This is an 84-year-old female who presented to the emergency room today  Patient underwent urologic procedure with nephrostomy tube removal, right PCN exchange by chronic right PCN.    Patient reported noticing subjective fever chills body aching rigors after being discharged home was after procedure.  Patient noted to have tremors and some regular leg movements.  Patient daughter who is a nurse and emergency medicine [place] was with her mother she began having nausea vomiting temperature 100.1 no other complaints diffuse Trulicity seem by ED physician without any active drainage or erythema.  Patient does have a leg bag in place    Patient denies any loss of cultures with no chest pain or shortness of breath no nausea vomiting abdominal pain no diarrhea no constipation  Patient past medical history significant for a fistula in her left groin from the bladder fistula.  This was converted to a Moreno catheter a few days ago    ED workup notable for:    CBC showed WBC of 12.6 elevated H&H 10.9/33.1  Lactate elevated 2.5  Urinalysis positive for large blood trace ketones positive nitrates and large leukocyte esterase  ABG done in the emergency room creatinine 1.7 BUN 28 glucose 20  Blood culture urine culture obtained and sent to microbiology lab for sensitivity identification  Patient started with sepsis protocol and IV fluid resuscitation after obtaining pancultured patient  Patient started with sepsis workup received to order for antibiotics and lactic acid 30 mill per KG fluids      Past Medical History:          Diagnosis Date    Arthritis     Bladder cancer (HCC)     Chronic renal

## 2024-03-22 NOTE — FLOWSHEET NOTE
03/22/24 0554 03/22/24 0629   Vitals   Temp (!) 102 °F (38.9 °C) (!) 101.7 °F (38.7 °C)   Temp Source Oral Oral     Pt temp elevated this morning, Pt given PO tylenol at 0633.  MD notified.

## 2024-03-22 NOTE — CARE COORDINATION
Case Management Assessment  Initial Evaluation    Date/Time of Evaluation: 3/22/2024 5:05 PM  Assessment Completed by: Samuel Narvaez RN    If patient is discharged prior to next notation, then this note serves as note for discharge by case management.    Patient Name: Portia Waggoner                   YOB: 1940  Diagnosis: Septicemia (HCC) [A41.9]  UTI (urinary tract infection), bacterial [N39.0, A49.9]  Sepsis (HCC) [A41.9]                   Date / Time: 3/21/2024  6:13 PM    Patient Admission Status: Inpatient   Readmission Risk (Low < 19, Mod (19-27), High > 27): Readmission Risk Score: 19    Current PCP: Shahriar Hamm MD  PCP verified by CM? Yes (Shahriar aHmm MD)    Chart Reviewed: Yes      History Provided by: Patient, Child/Family  Patient Orientation: Alert and Oriented, Person, Place, Situation    Patient Cognition: Alert    Hospitalization in the last 30 days (Readmission):  No    If yes, Readmission Assessment in  Navigator will be completed.       Advance Directives:      Code Status: Full Code   Patient's Primary Decision Maker is: Legal Next of Kin      Copy present: No     Discharge Planning:    Patient lives with: Children  Type of Home: House  Primary Care Giver: Self  Patient Support Systems include: Children     Current Financial resources: Medicare    Current community resources: None    Currently ACTIVE with [place] on Aging: No      Current services prior to admission: None            Current DME:              Type of Home Care services:  None    Home Care Information  Currently ACTIVE with Home Health Care: No  Home Care Agency: Not Applicable    ADLS  Prior functional level: Independent in ADLs/IADLs  Current functional level: Assistance with the following:, Mobility, Shopping, Housework, Cooking    PT AM-PAC: 17 /24  OT AM-PAC: 17 /24    Family can provide assistance at DC: Yes  Would you like Case Management to discuss the discharge plan with any other family

## 2024-03-22 NOTE — CONSULTS
Urology Attending Consult Note      Reason for Consultation: Sepsis following R PCN exchange    History:  83 y.o. 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 Diaz ) with subsequent development of severe R UPJ stenosis managed with a PCN. Had attempt at R antegrade stent placement with IR on  that was unsuccessful. She has had a few instances where she cuts her tubing due to confusion and has to have tubes replaced. She was last seen in the ER 24 after her martinez through the fistula fell out. ER replaced her catheter through the urethra. Yesterday she had her R PCN exchanged. Did well initially but developed nausea/vomiting, chills/rigors last evening. She was brought to the hospital and admitted for further management.     Family History, Social History, Review of Systems:  Reviewed and agreed to as per chart    Vitals:  BP (!) 94/58   Pulse 97   Temp 99.8 °F (37.7 °C) (Oral)   Resp 18   Ht 1.524 m (5')   Wt 51.5 kg (113 lb 9.6 oz)   SpO2 96%   BMI 22.19 kg/m²   Temp  Av.1 °F (37.8 °C)  Min: 97.9 °F (36.6 °C)  Max: 102 °F (38.9 °C)  No intake or output data in the 24 hours ending 24 1016      Physical:  Well developed, well nourished in no acute distress  Mood indicates no abnormalities. Pt doesn’t appear depressed  Neck is supple, trachea is midline  Respiratory effort is normal      Female :   R PCN draining clear  Urethral catheter draining clear  Vesicocutaneous fistula site without purulence/erythema      Labs:  WBC:    Lab Results   Component Value Date/Time    WBC 12.6 2024 07:04 PM     Hemoglobin/Hematocrit:    Lab Results   Component Value Date/Time    HGB 10.9 2024 07:04 PM    HCT 33.1 2024 07:04 PM     BMP:    Lab Results   Component Value Date/Time     2024 07:35 PM    K 4.4 2024 07:35 PM     2024 07:35 PM    CO2

## 2024-03-22 NOTE — ACP (ADVANCE CARE PLANNING)
ADVANCED CARE PLANNING    Name:Portia Waggoner       :  1940              MRN:  [id number]      Purpose of Encounter: Advanced care planning in light of advanced age, dementia, admission for sepsis    Parties in attendance: :Dante Wesley MD, Family members: Daughter  Britt Waggoner Child [phone]       Decisional Capacity:No    Diagnosis:     Sepsis sec to complicated UTI: POA     Complicated UTI: POA     Alzheimer's  Dementia: POA     Patients Medical Story: 84-year-old female with CKD stage III, history of bladder cancer with vesicocutaneous fistula managed with a [place] through the fistula and then more recently nephrolithiasis s/p R PCNL (Dr. Diaz ) with subsequent development of severe R UPJ stenosis managed with a PCN,  s/p failed attempt at R antegrade stent placement with IR on ,  who presented to the ED 3/21/24 with nausea/vomiting, chills/rigors following R PCN exchange.     Goals of Care Determinations: Patient wishes to focus on management of current acute condition, and return to her previous baseline, hopefully able to return home to the care of family    Regards to decision making, patient has no formal POA, patient's family per Britt (daughter), her and pxt's son Austin, are surrogate decision makers and make decisions together.    Plan: Will notify Shahriar Hamm MD of change in care plan. Will look at further interventions as needed.     Code Status: At this time patient wishes to be Full Code    Time Spent with Patient: 17 minutes      Electronically signed by Dante Oden MD on 3/22/2024 at 4:51 PM  Thank you Shahriar Hamm MD for the opportunity to be involved in this patient's care.     Dante Oden MD

## 2024-03-22 NOTE — ED NOTES
ED TO INPATIENT SBAR HANDOFF    Patient Name: Portia Waggoner   :  1940  84 y.o.   MRN:  [id number]  Preferred Name    ED Room #:  B25/B25-25  Family/Caregiver Present yes   Restraints no   Sitter no   Sepsis Risk Score Sepsis Risk Score: 14.23    Situation  Code Status: Prior No additional code details.    Allergies: Patient has no known allergies.  Weight: Patient Vitals for the past 96 hrs (Last 3 readings):   Weight   24 1820 51.5 kg (113 lb 9.6 oz)     Arrived from: home  Chief Complaint:   Chief Complaint   Patient presents with    Post-op Problem     R nephro tube replaced today, a few hours later had a couple episodes of emesis     Hospital Problem/Diagnosis:  Active Problems:    * No active hospital problems. *  Resolved Problems:    * No resolved hospital problems. *    Imaging:   XR CHEST PORTABLE   Final Result      No acute cardiopulmonary findings.      Electronically signed by Logan Murray MD        Abnormal labs:   Abnormal Labs Reviewed   CBC WITH AUTO DIFFERENTIAL - Abnormal; Notable for the following components:       Result Value    WBC 12.6 (*)     RBC 3.73 (*)     Hemoglobin 10.9 (*)     Hematocrit 33.1 (*)     RDW 15.8 (*)     Neutrophils Absolute 11.5 (*)     Lymphocytes Absolute 0.5 (*)     All other components within normal limits   LACTATE, SEPSIS - Abnormal; Notable for the following components:    Lactic Acid, Sepsis 2.5 (*)     All other components within normal limits   URINALYSIS WITH REFLEX TO CULTURE - Abnormal; Notable for the following components:    Ketones, Urine TRACE (*)     Blood, Urine LARGE (*)     Protein, UA 30 (*)     Nitrite, Urine POSITIVE (*)     Leukocyte Esterase, Urine LARGE (*)     All other components within normal limits   BLOOD GAS, VENOUS - Abnormal; Notable for the following components:    pCO2, Moody 35.9 (*)     pO2, [place] 53.7 (*)     HCO3, Venous 23.0 (*)     All other components within normal limits   HEPATIC FUNCTION PANEL - Abnormal; Notable for

## 2024-03-22 NOTE — ACP (ADVANCE CARE PLANNING)
Advance Care Planning     Advance Care Planning Inpatient Note  Spiritual Care Department    Today's Date: 3/22/2024  Unit: [address]    Received request from HealthCare Provider.  Upon review of chart and communication with care team, Spiritual Care will defer advance care planning with patient at this time.. Patient and Child/Children was/were present in the room during visit.    Goals of ACP Conversation:  After speaking with nursing, per nursing pt isn't capable of completing POA paperwork.  will provide pt and dter with POA paperwork.    Health Care Decision Makers:       Summary:  No Decision Maker named by patient at this time    Advance Care Planning Documents (Patient Wishes):  Per nursing, pt not capable of completing living will/POA. Pt and dter provided POA paperwork. p      Assessment:  Per nursing, pt not capable of completing POA paperwork.   Interventions:  Provided POA paperwork. No further visits planned at this time.     Care Preferences Communicated:   No    Outcomes/Plan:  Pt given POA paperwork.  has no further visits planned.     Electronically signed by QUITA Casillas on 3/22/2024 at 9:36 AM

## 2024-03-23 LAB
ANION GAP SERPL CALCULATED.3IONS-SCNC: 10 MMOL/L (ref 3–16)
BASOPHILS # BLD: 0 K/UL (ref 0–0.2)
BASOPHILS NFR BLD: 0.4 %
BUN SERPL-MCNC: 20 MG/DL (ref 7–20)
CALCIUM SERPL-MCNC: 8.2 MG/DL (ref 8.3–10.6)
CHLORIDE SERPL-SCNC: 112 MMOL/L (ref 99–110)
CO2 SERPL-SCNC: 23 MMOL/L (ref 21–32)
CREAT SERPL-MCNC: 1.6 MG/DL (ref 0.6–1.2)
DEPRECATED RDW RBC AUTO: 16 % (ref 12.4–15.4)
EOSINOPHIL # BLD: 0.2 K/UL (ref 0–0.6)
EOSINOPHIL NFR BLD: 2.3 %
GFR SERPLBLD CREATININE-BSD FMLA CKD-EPI: 31 ML/MIN/{1.73_M2}
GLUCOSE SERPL-MCNC: 105 MG/DL (ref 70–99)
HCT VFR BLD AUTO: 29.9 % (ref 36–48)
HGB BLD-MCNC: 9.8 G/DL (ref 12–16)
LYMPHOCYTES # BLD: 1.6 K/UL (ref 1–5.1)
LYMPHOCYTES NFR BLD: 21.6 %
MAGNESIUM SERPL-MCNC: 1.9 MG/DL (ref 1.8–2.4)
MCH RBC QN AUTO: 28.7 PG (ref 26–34)
MCHC RBC AUTO-ENTMCNC: 32.6 G/DL (ref 31–36)
MCV RBC AUTO: 88 FL (ref 80–100)
MONOCYTES # BLD: 0.7 K/UL (ref 0–1.3)
MONOCYTES NFR BLD: 9.2 %
NEUTROPHILS # BLD: 5 K/UL (ref 1.7–7.7)
NEUTROPHILS NFR BLD: 66.5 %
PLATELET # BLD AUTO: 149 K/UL (ref 135–450)
PMV BLD AUTO: 7.6 FL (ref 5–10.5)
POTASSIUM SERPL-SCNC: 4.1 MMOL/L (ref 3.5–5.1)
RBC # BLD AUTO: 3.4 M/UL (ref 4–5.2)
SODIUM SERPL-SCNC: 145 MMOL/L (ref 136–145)
VIT B12 SERPL-MCNC: 218 PG/ML (ref 211–911)
WBC # BLD AUTO: 7.6 K/UL (ref 4–11)

## 2024-03-23 PROCEDURE — 6360000002 HC RX W HCPCS: Performed by: INTERNAL MEDICINE

## 2024-03-23 PROCEDURE — 85025 COMPLETE CBC W/AUTO DIFF WBC: CPT

## 2024-03-23 PROCEDURE — 82607 VITAMIN B-12: CPT

## 2024-03-23 PROCEDURE — 80048 BASIC METABOLIC PNL TOTAL CA: CPT

## 2024-03-23 PROCEDURE — 2580000003 HC RX 258: Performed by: INTERNAL MEDICINE

## 2024-03-23 PROCEDURE — 83735 ASSAY OF MAGNESIUM: CPT

## 2024-03-23 PROCEDURE — 6370000000 HC RX 637 (ALT 250 FOR IP): Performed by: INTERNAL MEDICINE

## 2024-03-23 PROCEDURE — 2060000000 HC ICU INTERMEDIATE R&B

## 2024-03-23 PROCEDURE — 36415 COLL VENOUS BLD VENIPUNCTURE: CPT

## 2024-03-23 RX ORDER — CIPROFLOXACIN 2 MG/ML
400 INJECTION, SOLUTION INTRAVENOUS EVERY 24 HOURS
Status: DISCONTINUED | OUTPATIENT
Start: 2024-03-23 | End: 2024-03-24 | Stop reason: HOSPADM

## 2024-03-23 RX ADMIN — ACETAMINOPHEN 650 MG: 325 TABLET ORAL at 21:46

## 2024-03-23 RX ADMIN — THIAMINE HYDROCHLORIDE 100 MG: 100 INJECTION, SOLUTION INTRAMUSCULAR; INTRAVENOUS at 09:34

## 2024-03-23 RX ADMIN — ENOXAPARIN SODIUM 30 MG: 100 INJECTION SUBCUTANEOUS at 09:34

## 2024-03-23 RX ADMIN — CIPROFLOXACIN 400 MG: 400 INJECTION, SOLUTION INTRAVENOUS at 13:00

## 2024-03-23 RX ADMIN — SODIUM CHLORIDE, PRESERVATIVE FREE 10 ML: 5 INJECTION INTRAVENOUS at 09:47

## 2024-03-23 RX ADMIN — QUETIAPINE FUMARATE 25 MG: 25 TABLET ORAL at 21:46

## 2024-03-23 ASSESSMENT — PAIN SCALES - GENERAL
PAINLEVEL_OUTOF10: 0
PAINLEVEL_OUTOF10: 0

## 2024-03-23 NOTE — PLAN OF CARE
Problem: Discharge Planning  Goal: Discharge to home or other facility with appropriate resources  3/22/2024 2356 by Keiko Pope RN  Flowsheets (Taken 3/22/2024 2356)  Discharge to home or other facility with appropriate resources:   Identify barriers to discharge with patient and caregiver   Identify discharge learning needs (meds, wound care, etc)   Arrange for needed discharge resources and transportation as appropriate   Arrange for interpreters to assist at discharge as needed  Note: Patient is getting IV abx, patient is from home with son.      Problem: Safety - Adult  Goal: Free from fall injury  3/22/2024 2356 by Keiko Pope, GENNY  Flowsheets (Taken 3/22/2024 2356)  Free From Fall Injury: Instruct family/caregiver on patient safety  Note: Pt is a Fall Risk. See [place] Fall Risk Score. Pt bed in low position and side rails up. Call light and belongings in reach. Pt encouraged to call for assistance. Will continue with hourly rounds for PO intake, pain needs, toileting, and repositioning as needed.        Problem: Pain  Goal: Verbalizes/displays adequate comfort level or baseline comfort level  Outcome: Progressing  Flowsheets (Taken 3/22/2024 2356)  Verbalizes/displays adequate comfort level or baseline comfort level:   Encourage patient to monitor pain and request assistance   Administer analgesics based on type and severity of pain and evaluate response   Assess pain using appropriate pain scale   Implement non-pharmacological measures as appropriate and evaluate response  Note: Patient complained of back pain, Patient was satisfied with tylenol.

## 2024-03-23 NOTE — PLAN OF CARE
Problem: Pain  Goal: Verbalizes/displays adequate comfort level or baseline comfort level  3/23/2024 1140 by Sharron Manuel, RN  Outcome: Progressing  Patient continues to deny pain or discomfort. Will continue to monitor and treat as needed.    Problem: Safety - Adult  Goal: Free from fall injury  3/23/2024 1140 by Sharron Manuel, RN  Outcome: Progressing   Patient is alert and oriented to person and place. Able to follow commands. Daughter remains at bedside and standard fall precautions are in place. Will continue to monitor for safety.

## 2024-03-24 VITALS
DIASTOLIC BLOOD PRESSURE: 49 MMHG | WEIGHT: 116.4 LBS | RESPIRATION RATE: 18 BRPM | TEMPERATURE: 97.7 F | HEART RATE: 72 BPM | OXYGEN SATURATION: 100 % | SYSTOLIC BLOOD PRESSURE: 129 MMHG | HEIGHT: 60 IN | BODY MASS INDEX: 22.85 KG/M2

## 2024-03-24 LAB
BACTERIA UR CULT: ABNORMAL
FOLATE SERPL-MCNC: 12.62 NG/ML (ref 4.78–24.2)
ORGANISM: ABNORMAL
VIT B12 SERPL-MCNC: >2000 PG/ML (ref 211–911)

## 2024-03-24 PROCEDURE — 36415 COLL VENOUS BLD VENIPUNCTURE: CPT

## 2024-03-24 PROCEDURE — 83921 ORGANIC ACID SINGLE QUANT: CPT

## 2024-03-24 PROCEDURE — 6360000002 HC RX W HCPCS: Performed by: INTERNAL MEDICINE

## 2024-03-24 PROCEDURE — 82607 VITAMIN B-12: CPT

## 2024-03-24 PROCEDURE — 82746 ASSAY OF FOLIC ACID SERUM: CPT

## 2024-03-24 RX ORDER — QUETIAPINE FUMARATE 25 MG/1
25 TABLET, FILM COATED ORAL NIGHTLY
Qty: 14 TABLET | Refills: 0 | Status: SHIPPED | OUTPATIENT
Start: 2024-03-24 | End: 2024-04-07

## 2024-03-24 RX ORDER — CYANOCOBALAMIN 1000 UG/ML
1000 INJECTION, SOLUTION INTRAMUSCULAR; SUBCUTANEOUS ONCE
Status: COMPLETED | OUTPATIENT
Start: 2024-03-24 | End: 2024-03-24

## 2024-03-24 RX ORDER — CEFDINIR 300 MG/1
300 CAPSULE ORAL DAILY
Qty: 5 CAPSULE | Refills: 0 | Status: SHIPPED | OUTPATIENT
Start: 2024-03-24 | End: 2024-03-24

## 2024-03-24 RX ORDER — CEFDINIR 300 MG/1
300 CAPSULE ORAL DAILY
Qty: 7 CAPSULE | Refills: 0 | Status: SHIPPED | OUTPATIENT
Start: 2024-03-24 | End: 2024-03-31

## 2024-03-24 RX ADMIN — CYANOCOBALAMIN 1000 MCG: 1000 INJECTION, SOLUTION INTRAMUSCULAR; SUBCUTANEOUS at 12:10

## 2024-03-24 RX ADMIN — THIAMINE HYDROCHLORIDE 100 MG: 100 INJECTION, SOLUTION INTRAMUSCULAR; INTRAVENOUS at 09:13

## 2024-03-24 RX ADMIN — ENOXAPARIN SODIUM 30 MG: 100 INJECTION SUBCUTANEOUS at 09:13

## 2024-03-24 NOTE — CARE COORDINATION
Case Management Assessment            Discharge Note                    Date / Time of Note: 3/24/2024 3:08 PM                  Discharge Note Completed by: Rizwana Hanks RN    Patient Name: Portia Waggoner   YOB: 1940  Diagnosis: Septicemia (HCC) [A41.9]  UTI (urinary tract infection), bacterial [N39.0, A49.9]  Sepsis (HCC) [A41.9]   Date / Time: 3/21/2024  6:13 PM    Current PCP: Shahriar Hamm MD  Clinic patient: No    Hospitalization in the last 30 days: No       Advance Directives:  Code Status: Full Code  [place] DNR form completed and on chart: No    Financial:  Payor: [facility] MEDICARE / Plan: [facility] OPTUM PLAN [employer] [facility] ADV / Product Type: *No Product type* /      Pharmacy:    [id number] - [place], [place] - [address] - P [phone] - F [phone] [address] [facility] [address]  Phone: [phone] Fax: [phone]      Assistance purchasing medications?: Potential Assistance Purchasing Medications: No  Assistance provided by Case Management: None at this time    Does patient want to participate in local refill/ meds to beds program?:      Meds To Beds General Rules:  1. Can ONLY be done Monday- Friday between 8:30am-5pm  2. Prescription(s) must be in pharmacy by 3pm to be filled same day  3.Copy of patient's insurance/ prescription drug card and patient face sheet must be sent along with the prescription(s)  4. Cost of Rx cannot be added to hospital bill. If financial assistance is needed, please contact unit  or ;  or  CANNOT provide pharmacy voucher for patients co-pays  5. Patients can then  the prescription on their way out of the hospital at discharge, or pharmacy can deliver to the bedside if staff is available. (payment due at time of pick-up or delivery - cash, check, or card accepted)     Able to afford home medications/ co-pay costs: Yes    ADLS:  Current PT AM-PAC Score: 17

## 2024-03-24 NOTE — PROGRESS NOTES
Urology Attending Progress Note      Subjective: Laying down resting. Per nurse she has voided numerous times in the restroom after cath removal yesterday    Vitals:  BP (!) 129/49   Pulse 72   Temp 97.7 °F (36.5 °C) (Oral)   Resp 18   Ht 1.524 m (5')   Wt 52.8 kg (116 lb 6.5 oz)   SpO2 100%   BMI 22.73 kg/m²   Temp  Av °F (36.7 °C)  Min: 97.7 °F (36.5 °C)  Max: 98.2 °F (36.8 °C)    Intake/Output Summary (Last 24 hours) at 3/24/2024 1248  Last data filed at 3/23/2024 2142  Gross per 24 hour   Intake 480 ml   Output 450 ml   Net 30 ml         Exam: R PCN draining clear    Labs:  WBC:    Lab Results   Component Value Date/Time    WBC 7.6 2024 06:20 AM     Hemoglobin/Hematocrit:    Lab Results   Component Value Date/Time    HGB 9.8 2024 06:20 AM    HCT 29.9 2024 06:20 AM     BMP:    Lab Results   Component Value Date/Time     2024 06:20 AM    K 4.1 2024 06:20 AM     2024 06:20 AM    CO2 23 2024 06:20 AM    BUN 20 2024 06:20 AM    LABALBU 3.9 2024 07:35 PM    CREATININE 1.6 2024 06:20 AM    CALCIUM 8.2 2024 06:20 AM    GFRAA 32 2022 10:21 AM    GFRAA >60 2011 11:09 AM    LABGLOM 31 2024 06:20 AM     PT/INR:    Lab Results   Component Value Date/Time    PROTIME 14.8 2023 07:39 AM    INR 1.40 2023 09:55 AM     PTT:  No results found for: \"APTT\"[APTT    Impression/Plan: 85yo F with dementia, 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 Sp R PCN exchange yesterday, now admitted with sepsis     -Resting today  -Enterocbacter positive on culture. Abx per primary  -Moreno was removed and she has been able to void  -Ok to DC from our standpoint on abx course. Please call with any questions    GURWINDER Chacon  
     Urology Attending Progress Note      Subjective: No complaints today. She appears more alert/oriented today. Family present in room today    Vitals:  /64   Pulse 84   Temp 98.2 °F (36.8 °C) (Oral)   Resp 20   Ht 1.524 m (5')   Wt 52.1 kg (114 lb 13.8 oz)   SpO2 96%   BMI 22.43 kg/m²   Temp  Av.6 °F (37 °C)  Min: 98.2 °F (36.8 °C)  Max: 98.9 °F (37.2 °C)    Intake/Output Summary (Last 24 hours) at 3/23/2024 1442  Last data filed at 3/23/2024 0930  Gross per 24 hour   Intake 1090 ml   Output 1675 ml   Net -585 ml       Exam: R PCN draining clear, martinez in place    Labs:  WBC:    Lab Results   Component Value Date/Time    WBC 7.6 2024 06:20 AM     Hemoglobin/Hematocrit:    Lab Results   Component Value Date/Time    HGB 9.8 2024 06:20 AM    HCT 29.9 2024 06:20 AM     BMP:    Lab Results   Component Value Date/Time     2024 06:20 AM    K 4.1 2024 06:20 AM     2024 06:20 AM    CO2 23 2024 06:20 AM    BUN 20 2024 06:20 AM    LABALBU 3.9 2024 07:35 PM    CREATININE 1.6 2024 06:20 AM    CALCIUM 8.2 2024 06:20 AM    GFRAA 32 2022 10:21 AM    GFRAA >60 2011 11:09 AM    LABGLOM 31 2024 06:20 AM     PT/INR:    Lab Results   Component Value Date/Time    PROTIME 14.8 2023 07:39 AM    INR 1.40 2023 09:55 AM     PTT:  No results found for: \"APTT\"[APTT    Impression/Plan: 85yo F with dementia, 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 Sp R PCN exchange yesterday, now admitted with sepsis     -More alert/oriented today  -Enterocbacter positive on culture. Abx per primary  -Dr Diaz recommended martinez removal as fistula has healed. Will place order for removal and place depends on patient to see how she does    GURWINDER Chacon  
    Progress Note  Admit Date: 3/21/2024           CC: F/U for complicated UTI with sepsis    84-year-old female with CKD stage III, history of bladder cancer with vesicocutaneous fistula managed with a Moreno through the fistula and then more recently nephrolithiasis s/p R PCNL (Dr. Diaz 8/1) with subsequent development of severe R UPJ stenosis managed with a PCN,  s/p failed attempt at R antegrade stent placement with IR on 12/4/ 23,  who presented to the ED 3/21/24 with nausea/vomiting, chills/rigors following R PCN exchange.    Patient reported noticing fever, chills and rigors, body aching,  after being discharged home following what appears to be a routine R PCN exchange. Patient's daughter who is a nurse in emergency medicine in [place] was with her mother and noted the nausea, vomiting, temperature 100.1.    The patient did become septic after the nephrostomy tube placement around 1 year ago.     No chest pain or shortness of breath. No syncope, no history is needed by baseline cognitive deficit.    In the ED, patient was febrile 37.8, tachycardic 105. CBC showed WBC of 12.6 ; H&H at baseline 10.9/33.1. Lactate elevated 2.5; creatinine 1.7 BUN 28 glucose 20     Urinalysis was positive for large blood, > 100 WBCs, positive nitrates and large leukocyte esterase. CXR: No acute cardiopulmonary finding       Interval HPI  Today patient is sitting out of bed with daughter at bedside    Was febrile very early this morning with Tmax 38 point, mildly tachycardic; systolic blood pressures have been in the 90s to low 100s.       Review of Systems - Negative except as in HPI, although limited by cognitive deficits (baseline). .     Scheduled Medications:    ciprofloxacin  400 mg IntraVENous Q24H    thiamine  100 mg IntraVENous Daily    melatonin  3 mg Oral Nightly    sodium chloride flush  5-40 mL IntraVENous 2 times per day    enoxaparin  30 mg SubCUTAneous Daily    QUEtiapine  25 mg Oral Nightly      PRN 
    Progress Note  Admit Date: 3/21/2024           CC: F/U for complicated UTI with sepsis    84-year-old female with CKD stage III, history of bladder cancer with vesicocutaneous fistula managed with a Moreno through the fistula and then more recently nephrolithiasis s/p R PCNL (Dr. Diaz 8/1) with subsequent development of severe R UPJ stenosis managed with a PCN,  s/p failed attempt at R antegrade stent placement with IR on 12/4/ 23,  who presented to the ED 3/21/24 with nausea/vomiting, chills/rigors following R PCN exchange.    Patient reported noticing fever, chills and rigors, body aching,  after being discharged home following what appears to be a routine R PCN exchange. Patient's daughter who is a nurse in emergency medicine in [place] was with her mother and noted the nausea, vomiting, temperature 100.1.    The patient did become septic after the nephrostomy tube placement around 1 year ago.     No chest pain or shortness of breath. No syncope, no history is needed by baseline cognitive deficit.    In the ED, patient was febrile 37.8, tachycardic 105. CBC showed WBC of 12.6 ; H&H at baseline 10.9/33.1. Lactate elevated 2.5; creatinine 1.7 BUN 28 glucose 20     Urinalysis was positive for large blood, > 100 WBCs, positive nitrates and large leukocyte esterase. CXR: No acute cardiopulmonary finding       Interval HPI  Today patient is sitting out of bed with daughter at bedside    Was febrile very early this morning with Tmax 38 point, mildly tachycardic; systolic blood pressures have been in the 90s to low 100s.       Review of Systems - Negative except as in HPI, although limited by cognitive deficits (baseline). .     Scheduled Medications:    sodium chloride flush  5-40 mL IntraVENous 2 times per day    enoxaparin  30 mg SubCUTAneous Daily    cefepime  1,000 mg IntraVENous Q24H    QUEtiapine  25 mg Oral Nightly      PRN Medications: sodium chloride flush, sodium chloride, ondansetron **OR** 
   03/22/24 0933   Encounter Summary   Encounter Overview/Reason  Initial Encounter   Service Provided For: Patient and family together   Support System Children   Last Encounter  03/22/24  (MSR-Consult, POA.  provided POA paperwork to dter, nurse doesn't believe pt capable of completing. No Further visits planned.)   Begin Time 0928   End Time  0935   Total Time Calculated 7 min   Advance Care Planning   Type ACP conversation     Krissy Kothari MDiv., BCC  
4 Eyes Skin Assessment     NAME:  Portia Waggoner  YOB: 1940  MEDICAL RECORD NUMBER:  [id number]    The patient is being assessed for  Transfer to New Unit    I agree that at least one RN has performed a thorough Head to Toe Skin Assessment on the patient. ALL assessment sites listed below have been assessed.      Areas assessed by both nurses:    Head, Face, Ears, Shoulders, Back, Chest, Arms, Elbows, Hands, Sacrum. Buttock, Coccyx, Ischium, Legs. Feet and Heels, and Under Medical Devices         Skin tear, left lower shin    Does the Patient have a Wound? No noted wound(s)       Manfred Prevention initiated by RN: No  Wound Care Orders initiated by RN: No    Pressure Injury (Stage 3,4, Unstageable, DTI, NWPT, and Complex wounds) if present, place Wound referral order by RN under : No    New Ostomies, if present place, Ostomy referral order under : No     Nurse 1 eSignature: Electronically signed by Keiko Pope RN on 3/22/24 at 7:48 PM EDT    **SHARE this note so that the co-signing nurse can place an eSignature**    Nurse 2 eSignature: Electronically signed by Sharron Manuel RN on 3/22/24 at 7:53 PM EDT   
4 Eyes Skin Assessment     NAME:  Portia Waggoner  YOB: 1940  MEDICAL RECORD NUMBER:  [id number]    The patient is being assessed for  Admission    I agree that at least one RN has performed a thorough Head to Toe Skin Assessment on the patient. ALL assessment sites listed below have been assessed.      Areas assessed by both nurses:    Head, Face, Ears, Shoulders, Back, Chest, Arms, Elbows, Hands, Sacrum. Buttock, Coccyx, Ischium, Legs. Feet and Heels, and Under Medical Devices         Does the Patient have a Wound? No noted wound(s)    Pt has nephrostomy tube on R side. Small opening of an old catheter site in the Left anterior groin area, noted to be pink in color. Noted tear on lower L anterior roche.        Manfred Prevention initiated by RN: Yes  Wound Care Orders initiated by RN: No    Pressure Injury (Stage 3,4, Unstageable, DTI, NWPT, and Complex wounds) if present, place Wound referral order by RN under : No    New Ostomies, if present place, Ostomy referral order under : No     Nurse 1 eSignature: Electronically signed by Janet Bush RN on 3/22/24 at 12:50 AM EDT    **SHARE this note so that the co-signing nurse can place an eSignature**    Nurse 2 eSignature: Electronically signed by Trisha Contreras RN on 3/22/24 at 2:40 AM EDT    
Midnight and morning vitals not completed per daughter request to let pt stay asleep at these times. Pt refused morning labs. MD made aware  
Occupational Therapy/Physical Therapy  Attempted    Orders received, chart reviewed. Attempted to see pt this morning. Dtr at bedside and requests f/u later today as pt very sleepy at this time. Will attempt f/u later today as schedule permits.    Priscilla Spear OTR/L #[id number]  Cammie Marques, PT, DPT    
Patient was received from 6 south per bed at 1900. Alert and oriented to person and place. NSR on monitor. Denies pain. Will monitor.  
Patient with loss of IV access.  Dr. Oden ok with patient not having an IV at this time.   
Physical Therapy  Facility/Department: [address]  Physical Therapy Initial Assessment    Name: Portia Waggoner  : 1940  MRN: [id number]  Date of Service: 3/22/2024    Discharge Recommendations:  24 hour supervision or assist, Home with Home health PT   PT Equipment Recommendations  Equipment Needed: Yes  Mobility Devices: Walker  Walker: Rolling      HOME HEALTH CARE: LEVEL 3 SAFETY     - Initial home health evaluation to occur within 24-48 hours, in patient home   - Therapy evaluations in home within 24-48 hours of discharge; including DME and home safety   - Frontload therapy 5 days, then 3x a week   - Therapy to evaluate if patient has Home Health Aide needs for personal care   -  evaluation within 24-48 hours, includes evaluation of resources and insurance to determine AL, [place], LTC, and Medicaid options       Patient Diagnosis(es): The primary encounter diagnosis was Septicemia (HCC). A diagnosis of UTI (urinary tract infection), bacterial was also pertinent to this visit.  Past Medical History:  has a past medical history of Arthritis, Bladder cancer (HCC), Chronic renal failure, stage 3 (moderate), unspecified whether stage 3a or 3b CKD (HCC), Kidney stone, Osteoporosis, Senile dementia with behavioral disturbance (HCC), Wears dentures, Wears glasses, and Wears hearing aid.  Past Surgical History:  has a past surgical history that includes Bladder surgery (N/A); lymph node biopsy (Left, 2023); Kidney stone removal (Right, 2023); IR GUIDED URETERAL STENT PLACE THRU EXIST TRACT (8/15/2023); and IR GUIDED NEPHROSTOMY CATH PLACEMENT RIGHT (2023).    Assessment   Body Structures, Functions, Activity Limitations Requiring Skilled Therapeutic Intervention: Decreased functional mobility ;Decreased safe awareness;Decreased endurance;Decreased balance;Decreased strength;Increased pain  Assessment: pt is an 85 yo female from home with son and IND at baseline without AD 
Report received from SBAR note.  Pt arrived to room 6320 via stretcher from ED w/daughter at bedside.  Pt oriented to room and use of call light. Pt verbalized understanding. Pt AO x self and situation. Pt disoriented to place and time. Pt has LR running at 125 mL/hr. 20 gauge PIV  right wrist and 22g to LAC. Moreno in place draining to leg bad. Moreno clean, dry, and intact. Pt has R nephrostomy tube. Dressing site is clean, dry, and intact. Draining to collection bag secured to belly. Pt placed on telemetry.  Assessment performed. Vital signs:   bpm, /67, T 99.2, RR 20, SpO2 98%.  Bed alarm on. Bed wheels locked and in the lowest position. Call light within reach. Non skid socks applied.   
[facility] - Clinical Pharmacy Note - Renal Dosing    Cefepime ordered for treatment of UTI. Per [facility] Renal Dose Adjustment Policy, cefepime 2000 mg q12h will be changed to 1000 mg q24h.     Estimated Creatinine Clearance: Estimated Creatinine Clearance: 17 mL/min (A) (based on SCr of 1.7 mg/dL (H)).  Dialysis Status, ABNER, CKD: yes  BMI: Body mass index is 22.19 kg/m².    Rationale for Adjustment: Agent is renally eliminated.    Pharmacy will continue to monitor renal function and adjust dose as necessary.    Please call with any questions.  Yulia Khan, PharmD, BCPS  Main pharmacy: [id number]  3/21/2024 9:44 PM      
  Time Out 1140         Minutes 45          Timed Code Treatment Minutes:   30    Total Treatment Minutes:  45         Priscilla Spear OTR/PERLITA #[id number]

## 2024-03-24 NOTE — DISCHARGE SUMMARY
with a Martinez through the fistula   - Dr Diaz (Urology) recommended martinez removal as fistula has healed.   - O/P F/u with Urology     Hx of nephrolithiasis (Large renal pelvis stone) s/p R PCNL (Dr. Diaz 8/1)   R UPJ stricture with chronic hydronephrosis likely complication of previous Large renal pelvis stone and mgt with R PCNL Chronic right PCN: mgt for R UPJ stricture with chronic right hydronephrosis, s/p failed attempt at R antegrade stent placement with IR on 12/4/ 23  - Admitted hours after R PCN exchange.3/21/24  - Appears has appropriate drainage from PCN  - O/P F/u with Urology/IR     Chronic CHF with preserved EF: POA  - Denies dyspnea or leg swelling  - Reviewed last echo from 8/2023:       Normal LV size, EF 55-60%. No RWMA;  Grade II diastolic dysfunction with elevated LV filling pressures.   The right ventricle is mildly dilated with normal systolic function.   The left atrium is moderate to severely dilated.   The right atrium is mildly dilated.   Mild to moderate mitral regurgitation    - Remains euvolemic      Dementia: POA  -High risk for delirium, appears had significant delirium last admission.   - Has appt next week with neurology : encouraged to keep the appt    Borderline Vitamin B12 level: POA  - Vit B12 was  227 in 10/2023, and on repeat 3/13/24, 218  - In spite of her advanced age, she seems pretty active. As has had some cognitive issues, would evaluate further with MMA and Homocysteine level (as well as Folate level); Recommend F/u with PCP for result. We gave 1 dose parenteral cyanocobalamin,  for now, pending confirmation of deficiency with testing,  may need long term parenteral supplementation depending on result.   - She has an appt with neurology next week, where her cog issues appear will be addressed.     CKD stage IIIb: POA  - Cr appears at baseline         Physical Exam: /81   Pulse 79   Temp 98.2 °F (36.8 °C) (Oral)   Resp 18   Ht 1.524 m (5')   Wt 52.8 kg (116 lb

## 2024-03-25 LAB
BACTERIA BLD CULT ORG #2: NORMAL
BACTERIA BLD CULT: NORMAL

## 2024-03-27 LAB — METHYLMALONATE SERPL-SCNC: 0.26 UMOL/L (ref 0–0.4)

## 2024-06-12 NOTE — PROGRESS NOTES
Attempted to call patient about procedure. No answer. Voicemail left. Told to be here at 1130 for procedure at 1300. NPO after midnight, but can take morning medication with sips of water. Office should have told them when and if they should stop any blood thinners. Told to have a responsible adult be with the patient to take them home and stay with them afterwards, if they are not admitted to hospital afterwards. And if available bring current list of medications.

## 2024-06-13 ENCOUNTER — HOSPITAL ENCOUNTER (OUTPATIENT)
Dept: INTERVENTIONAL RADIOLOGY/VASCULAR | Age: 84
Discharge: HOME | End: 2024-06-13

## 2024-07-06 ENCOUNTER — HOSPITAL ENCOUNTER (INPATIENT)
Age: 84
LOS: 5 days | Discharge: HOME | End: 2024-07-11
Attending: STUDENT IN AN ORGANIZED HEALTH CARE EDUCATION/TRAINING PROGRAM | Admitting: INTERNAL MEDICINE
Payer: MEDICARE

## 2024-07-06 ENCOUNTER — APPOINTMENT (OUTPATIENT)
Dept: CT IMAGING | Age: 84
End: 2024-07-06
Payer: MEDICARE

## 2024-07-06 DIAGNOSIS — N39.0 URINARY TRACT INFECTION WITHOUT HEMATURIA, SITE UNSPECIFIED: Primary | ICD-10-CM

## 2024-07-06 DIAGNOSIS — T83.098A MALFUNCTION OF NEPHROSTOMY TUBE (HCC): ICD-10-CM

## 2024-07-06 LAB
ANION GAP SERPL CALCULATED.3IONS-SCNC: 11 MMOL/L (ref 3–16)
BACTERIA URNS QL MICRO: ABNORMAL /HPF
BASOPHILS # BLD: 0 K/UL (ref 0–0.2)
BASOPHILS NFR BLD: 0.4 %
BILIRUB UR QL STRIP.AUTO: ABNORMAL
BUN SERPL-MCNC: 38 MG/DL (ref 7–20)
CALCIUM SERPL-MCNC: 9.2 MG/DL (ref 8.3–10.6)
CHLORIDE SERPL-SCNC: 107 MMOL/L (ref 99–110)
CLARITY UR: CLEAR
CO2 SERPL-SCNC: 21 MMOL/L (ref 21–32)
COLOR UR: YELLOW
CREAT SERPL-MCNC: 2 MG/DL (ref 0.6–1.2)
DEPRECATED RDW RBC AUTO: 14.6 % (ref 12.4–15.4)
EOSINOPHIL # BLD: 0.1 K/UL (ref 0–0.6)
EOSINOPHIL NFR BLD: 2 %
GFR SERPLBLD CREATININE-BSD FMLA CKD-EPI: 24 ML/MIN/{1.73_M2}
GLUCOSE SERPL-MCNC: 142 MG/DL (ref 70–99)
GLUCOSE UR STRIP.AUTO-MCNC: NEGATIVE MG/DL
HCT VFR BLD AUTO: 38 % (ref 36–48)
HGB BLD-MCNC: 12.5 G/DL (ref 12–16)
HGB UR QL STRIP.AUTO: ABNORMAL
KETONES UR STRIP.AUTO-MCNC: NEGATIVE MG/DL
LEUKOCYTE ESTERASE UR QL STRIP.AUTO: ABNORMAL
LYMPHOCYTES # BLD: 1.8 K/UL (ref 1–5.1)
LYMPHOCYTES NFR BLD: 26.5 %
MCH RBC QN AUTO: 30.4 PG (ref 26–34)
MCHC RBC AUTO-ENTMCNC: 32.8 G/DL (ref 31–36)
MCV RBC AUTO: 92.5 FL (ref 80–100)
MONOCYTES # BLD: 0.6 K/UL (ref 0–1.3)
MONOCYTES NFR BLD: 8.9 %
NEUTROPHILS # BLD: 4.3 K/UL (ref 1.7–7.7)
NEUTROPHILS NFR BLD: 62.2 %
NITRITE UR QL STRIP.AUTO: POSITIVE
PH UR STRIP.AUTO: 6 [PH] (ref 5–8)
PLATELET # BLD AUTO: 195 K/UL (ref 135–450)
PMV BLD AUTO: 8 FL (ref 5–10.5)
POTASSIUM SERPL-SCNC: 5 MMOL/L (ref 3.5–5.1)
PROT UR STRIP.AUTO-MCNC: 100 MG/DL
RBC # BLD AUTO: 4.11 M/UL (ref 4–5.2)
RBC #/AREA URNS HPF: ABNORMAL /HPF (ref 0–4)
SODIUM SERPL-SCNC: 139 MMOL/L (ref 136–145)
SP GR UR STRIP.AUTO: 1.02 (ref 1–1.03)
UA COMPLETE W REFLEX CULTURE PNL UR: YES
UA DIPSTICK W REFLEX MICRO PNL UR: YES
URN SPEC COLLECT METH UR: ABNORMAL
UROBILINOGEN UR STRIP-ACNC: 0.2 E.U./DL
WBC # BLD AUTO: 6.9 K/UL (ref 4–11)
WBC #/AREA URNS HPF: ABNORMAL /HPF (ref 0–5)

## 2024-07-06 PROCEDURE — 81001 URINALYSIS AUTO W/SCOPE: CPT

## 2024-07-06 PROCEDURE — 74176 CT ABD & PELVIS W/O CONTRAST: CPT

## 2024-07-06 PROCEDURE — 80048 BASIC METABOLIC PNL TOTAL CA: CPT

## 2024-07-06 PROCEDURE — 87086 URINE CULTURE/COLONY COUNT: CPT

## 2024-07-06 PROCEDURE — 87186 SC STD MICRODIL/AGAR DIL: CPT

## 2024-07-06 PROCEDURE — 6370000000 HC RX 637 (ALT 250 FOR IP): Performed by: INTERNAL MEDICINE

## 2024-07-06 PROCEDURE — 6360000002 HC RX W HCPCS: Performed by: STUDENT IN AN ORGANIZED HEALTH CARE EDUCATION/TRAINING PROGRAM

## 2024-07-06 PROCEDURE — 85025 COMPLETE CBC W/AUTO DIFF WBC: CPT

## 2024-07-06 PROCEDURE — 2580000003 HC RX 258: Performed by: STUDENT IN AN ORGANIZED HEALTH CARE EDUCATION/TRAINING PROGRAM

## 2024-07-06 PROCEDURE — 1200000000 HC SEMI PRIVATE

## 2024-07-06 PROCEDURE — 2580000003 HC RX 258: Performed by: INTERNAL MEDICINE

## 2024-07-06 PROCEDURE — 99285 EMERGENCY DEPT VISIT HI MDM: CPT

## 2024-07-06 PROCEDURE — 87077 CULTURE AEROBIC IDENTIFY: CPT

## 2024-07-06 RX ORDER — ONDANSETRON 2 MG/ML
4 INJECTION INTRAMUSCULAR; INTRAVENOUS EVERY 6 HOURS PRN
Status: DISCONTINUED | OUTPATIENT
Start: 2024-07-06 | End: 2024-07-11 | Stop reason: HOSPADM

## 2024-07-06 RX ORDER — SODIUM CHLORIDE 0.9 % (FLUSH) 0.9 %
5-40 SYRINGE (ML) INJECTION PRN
Status: DISCONTINUED | OUTPATIENT
Start: 2024-07-06 | End: 2024-07-11 | Stop reason: HOSPADM

## 2024-07-06 RX ORDER — SODIUM CHLORIDE 9 MG/ML
INJECTION, SOLUTION INTRAVENOUS CONTINUOUS
Status: ACTIVE | OUTPATIENT
Start: 2024-07-06 | End: 2024-07-07

## 2024-07-06 RX ORDER — ACETAMINOPHEN 500 MG
500 TABLET ORAL NIGHTLY
Status: DISCONTINUED | OUTPATIENT
Start: 2024-07-06 | End: 2024-07-11 | Stop reason: HOSPADM

## 2024-07-06 RX ORDER — QUETIAPINE FUMARATE 25 MG/1
25 TABLET, FILM COATED ORAL NIGHTLY
Status: DISCONTINUED | OUTPATIENT
Start: 2024-07-06 | End: 2024-07-06

## 2024-07-06 RX ORDER — POLYETHYLENE GLYCOL 3350 17 G/17G
17 POWDER, FOR SOLUTION ORAL DAILY PRN
Status: DISCONTINUED | OUTPATIENT
Start: 2024-07-06 | End: 2024-07-11 | Stop reason: HOSPADM

## 2024-07-06 RX ORDER — QUETIAPINE FUMARATE 25 MG/1
25 TABLET, FILM COATED ORAL EVERY MORNING
Status: DISCONTINUED | OUTPATIENT
Start: 2024-07-07 | End: 2024-07-11

## 2024-07-06 RX ORDER — ACETAMINOPHEN 325 MG/1
650 TABLET ORAL EVERY 6 HOURS PRN
Status: DISCONTINUED | OUTPATIENT
Start: 2024-07-06 | End: 2024-07-11 | Stop reason: HOSPADM

## 2024-07-06 RX ORDER — ACETAMINOPHEN 650 MG/1
650 SUPPOSITORY RECTAL EVERY 6 HOURS PRN
Status: DISCONTINUED | OUTPATIENT
Start: 2024-07-06 | End: 2024-07-11 | Stop reason: HOSPADM

## 2024-07-06 RX ORDER — SODIUM CHLORIDE, SODIUM LACTATE, POTASSIUM CHLORIDE, AND CALCIUM CHLORIDE .6; .31; .03; .02 G/100ML; G/100ML; G/100ML; G/100ML
1000 INJECTION, SOLUTION INTRAVENOUS ONCE
Status: COMPLETED | OUTPATIENT
Start: 2024-07-06 | End: 2024-07-06

## 2024-07-06 RX ORDER — ONDANSETRON 4 MG/1
4 TABLET, ORALLY DISINTEGRATING ORAL EVERY 8 HOURS PRN
Status: DISCONTINUED | OUTPATIENT
Start: 2024-07-06 | End: 2024-07-11 | Stop reason: HOSPADM

## 2024-07-06 RX ORDER — SODIUM CHLORIDE 9 MG/ML
INJECTION, SOLUTION INTRAVENOUS PRN
Status: DISCONTINUED | OUTPATIENT
Start: 2024-07-06 | End: 2024-07-11 | Stop reason: HOSPADM

## 2024-07-06 RX ORDER — QUETIAPINE FUMARATE 25 MG/1
50 TABLET, FILM COATED ORAL NIGHTLY
Status: DISCONTINUED | OUTPATIENT
Start: 2024-07-06 | End: 2024-07-11 | Stop reason: HOSPADM

## 2024-07-06 RX ORDER — SODIUM CHLORIDE 0.9 % (FLUSH) 0.9 %
5-40 SYRINGE (ML) INJECTION EVERY 12 HOURS SCHEDULED
Status: DISCONTINUED | OUTPATIENT
Start: 2024-07-06 | End: 2024-07-11 | Stop reason: HOSPADM

## 2024-07-06 RX ADMIN — SODIUM CHLORIDE: 9 INJECTION, SOLUTION INTRAVENOUS at 17:46

## 2024-07-06 RX ADMIN — CEFEPIME 2000 MG: 2 INJECTION, POWDER, FOR SOLUTION INTRAVENOUS at 16:00

## 2024-07-06 RX ADMIN — ACETAMINOPHEN 500 MG: 500 TABLET ORAL at 21:12

## 2024-07-06 RX ADMIN — SODIUM CHLORIDE, POTASSIUM CHLORIDE, SODIUM LACTATE AND CALCIUM CHLORIDE 1000 ML: 600; 310; 30; 20 INJECTION, SOLUTION INTRAVENOUS at 14:19

## 2024-07-06 RX ADMIN — QUETIAPINE FUMARATE 50 MG: 25 TABLET ORAL at 21:08

## 2024-07-06 ASSESSMENT — PAIN - FUNCTIONAL ASSESSMENT: PAIN_FUNCTIONAL_ASSESSMENT: NONE - DENIES PAIN

## 2024-07-06 NOTE — ED PROVIDER NOTES
smoking use included cigarettes. She has never used smokeless tobacco. She reports current alcohol use of about 1.0 standard drink of alcohol per week. She reports that she does not use drugs.    Medications     Previous Medications    ACETAMINOPHEN (TYLENOL) 500 MG TABLET    Take 1 tablet by mouth every 6 hours as needed for Pain    MELATONIN 5 MG TABS TABLET    Take 1 tablet by mouth nightly as needed (Insomnia)    MULTIPLE VITAMINS-MINERALS (THERAPEUTIC MULTIVITAMIN-MINERALS) TABLET    Take 1 tablet by mouth daily    NITROFURANTOIN, MACROCRYSTAL-MONOHYDRATE, (MACROBID) 100 MG CAPSULE    Take 1 capsule by mouth Once daily    QUETIAPINE (SEROQUEL) 25 MG TABLET    Take 1 tablet by mouth at bedtime for 14 days Once nightly       Allergies     She has No Known Allergies.    Physical Exam     INITIAL VITALS: BP: 124/74, Temp: 97.8 °F (36.6 °C), Pulse: 55, Respirations: 18, SpO2: 100 %   Physical Exam  Constitutional:       General: She is not in acute distress.     Appearance: Normal appearance. She is not ill-appearing.   HENT:      Head: Normocephalic and atraumatic.      Nose: Nose normal. No congestion.      Mouth/Throat:      Mouth: Mucous membranes are moist.      Pharynx: Oropharynx is clear.   Eyes:      Extraocular Movements: Extraocular movements intact.      Conjunctiva/sclera: Conjunctivae normal.      Pupils: Pupils are equal, round, and reactive to light.   Cardiovascular:      Rate and Rhythm: Normal rate and regular rhythm.      Pulses: Normal pulses.      Heart sounds: Normal heart sounds.   Pulmonary:      Effort: Pulmonary effort is normal.      Breath sounds: Normal breath sounds.   Abdominal:      General: Abdomen is flat. There is no distension.      Palpations: Abdomen is soft.      Tenderness: There is no abdominal tenderness. There is no guarding or rebound.      Comments: Right flank percutaneous nephrostomy tube without signs of erythema.  Nontender on palpation   Musculoskeletal:       Cervical back: Normal range of motion and neck supple.      Right lower leg: No edema.      Left lower leg: No edema.   Skin:     General: Skin is warm and dry.   Neurological:      General: No focal deficit present.      Mental Status: She is alert. Mental status is at baseline.                    Porter Hilario MD  07/06/24 151

## 2024-07-06 NOTE — PROGRESS NOTES
4 Eyes Skin Assessment     NAME:  Portia Waggoner  YOB: 1940  MEDICAL RECORD NUMBER:  [id number]    The patient is being assessed for  Admission    I agree that at least one RN has performed a thorough Head to Toe Skin Assessment on the patient. ALL assessment sites listed below have been assessed.      Areas assessed by both nurses:    Head, Face, Ears, Shoulders, Back, Chest, Arms, Elbows, Hands, Sacrum. Buttock, Coccyx, Ischium, and Legs. Feet and Heels        Does the Patient have a Wound? No noted wound(s)       Manfred Prevention initiated by RN: No  Wound Care Orders initiated by RN: No    Pressure Injury (Stage 3,4, Unstageable, DTI, NWPT, and Complex wounds) if present, place Wound referral order by RN under : No    New Ostomies, if present place, Ostomy referral order under : No     Nurse 1 eSignature: Electronically signed by Jamil Howe RN on 7/6/24 at 5:01 PM EDT    **SHARE this note so that the co-signing nurse can place an eSignature**    Nurse 2 eSignature: Electronically signed by Pablo Tolbert RN on 7/6/24 at 5:27 PM EDT

## 2024-07-06 NOTE — H&P
V2.0  History and Physical      Name:  Portia Waggoner /Age/Sex: 1940  (84 y.o. female)   MRN & CSN:  [id number] & [id number] Encounter Date/Time: 2024 3:28 PM EDT   Location:  [place] [address] PCP: Shahriar Hamm MD       Hospital Day: 1    Assessment and Plan:   Portia Waggoner is a 84 y.o. female with a pmh of nephrolithiasis, requiring percutaneous nephrostomy tube on the right, recurrent UTIs with MDRO, dementia at baseline who presents with worsening mental status with combative behavior and suspected blockage of the right nephrostomy tube  Hospital Problems             Last Modified POA    * (Principal) Malfunction of nephrostomy tube (HCC) 2024 Yes   #Worsening mental status with intermittent combativeness  #Alzheimer's dementia with behavioral changes at times, especially when patient gets a urine infection  #UTI, prior history of MDRO  #Malfunctioning right-sided nephrostomy tube  #ABNER on CKD  #Soft blood pressure with sinus bradycardia    Plan:  Continue on cefepime every 8 hourly  Follow urine cultures  Patient received 1 L of LR in ED, will continue gentle IV hydration as ordered  Urology and IR consulted by ED  Monitor renal function, electrolytes  Monitor blood pressure closely  Seroquel at night (at home the son gives 50 mg of Seroquel with 500 mg of Tylenol at 8 PM and 25 mg of Seroquel during the daytime if patient is not drowsy)  DVT prophylaxis with SCD pending IR intervention to replace the right nephrostomy tube  Supportive therapy    Disposition:   Current Living situation: Home  Expected Disposition: Home  Estimated D/C: 2-3 days    Diet ADULT DIET; Regular   DVT Prophylaxis [] Lovenox, []  Heparin, [x] SCDs, [] Ambulation,  [] Eliquis, [] Xarelto, [] Coumadin   Code Status Full   Surrogate Decision Maker/ POA Daughter-Alejandra Waggoner  [phone]     Personally reviewed Lab Studies CBC, BMP, UA reflex to culture and Imaging     Discussed management of the case with ED provider who  Alcohol use: Yes     Alcohol/week: 1.0 standard drink of alcohol     Types: 1 Glasses of wine per week     Comment: rare    Drug use: Never     Social Determinants of Health     Food Insecurity: No Food Insecurity (3/21/2024)    Hunger Vital Sign     Worried About Running Out of Food in the Last Year: Never true     Ran Out of Food in the Last Year: Never true   Transportation Needs: No Transportation Needs (3/21/2024)    PRAPARE - Transportation     Lack of Transportation (Medical): No     Lack of Transportation (Non-Medical): No   Housing Stability: Low Risk  (3/21/2024)    Housing Stability Vital Sign     Unable to Pay for Housing in the Last Year: No     Number of Places Lived in the Last Year: 1     Unstable Housing in the Last Year: No       Medications:   Medications:    cefepime  2,000 mg IntraVENous Once      Infusions: Normal saline infusion  PRN Meds:  Refer to orders    Labs      CBC:   Recent Labs     07/06/24  1430   WBC 6.9   HGB 12.5        BMP:    Recent Labs     07/06/24  1430      K 5.0      CO2 21   BUN 38*   CREATININE 2.0*   GLUCOSE 142*     Hepatic: No results for input(s): \"AST\", \"ALT\", \"BILITOT\", \"ALKPHOS\" in the last 72 hours.    Invalid input(s): \"ALB\"  Lipids:   Lab Results   Component Value Date/Time    CHOL 217 09/03/2022 10:21 AM    HDL 44 09/03/2022 10:21 AM    HDL 64 11/08/2011 11:09 AM    TRIG 109 09/03/2022 10:21 AM     Hemoglobin A1C:   Lab Results   Component Value Date/Time    LABA1C 6.7 08/02/2023 09:07 AM     TSH:   Lab Results   Component Value Date/Time    TSH 1.55 11/23/2023 07:47 PM     Troponin: No results found for: \"TROPONINT\"  Lactic Acid: No results for input(s): \"LACTA\" in the last 72 hours.  BNP: No results for input(s): \"PROBNP\" in the last 72 hours.  UA:  Lab Results   Component Value Date/Time    NITRU POSITIVE 07/06/2024 02:30 PM    COLORU Yellow 07/06/2024 02:30 PM    PHUR 6.0 07/06/2024 02:30 PM    PHUR 6.5 03/21/2024 07:49 PM    WBCUA

## 2024-07-06 NOTE — ED NOTES
pt presenting with c/o R sided neph tube not draining urine properly. Pt to be admitted for neph tube malfunction. Pt also noted to have UTI.     Background  History:   Past Medical History:   Diagnosis Date    Arthritis     Bladder cancer (HCC)     Chronic renal failure, stage 3 (moderate), unspecified whether stage 3a or 3b CKD (HCC)     Kidney stone     Osteoporosis     Senile dementia with behavioral disturbance (HCC)     Wears dentures     WEARS UPPER ONES CURRENTLY    Wears glasses     Wears hearing aid     BILATERAL       Assessment    Vitals/MEWS: MEWS Score: 1  Level of Consciousness: Alert (0)   Vitals:    07/06/24 1328 07/06/24 1421   BP: 124/74 125/71   Pulse: 55 58   Resp: 18    Temp: 97.8 °F (36.6 °C)    TempSrc: Oral    SpO2: 100% 100%     FiO2 (%):   O2 Flow Rate: O2 Device: None (Room air)    Cardiac Rhythm:    Pain Assessment:  [x] Verbal [] Weldon Obregon Scale  Pain Scale: Pain Assessment  Pain Assessment: None - Denies Pain  Last documented pain score (0-10 scale)    Last documented pain medication administered: N/A  Mental Status: alert, requires reminders and repetition but does follow commands. Oriented to self and situation   Orientation Level:    NIH Score:    C-SSRS:    Bedside swallow:    [place] Coma Scale (GCS): [place] Coma Scale  Eye Opening: Spontaneous  Best Verbal Response: Confused  Best Motor Response: Obeys commands  [place] Coma Scale Score: 14  Active LDA's:   Peripheral IV 07/06/24 Left Antecubital (Active)      Nephrostomy Tube 1 Right Flank 10 fr (Active)               PO Status: none at this time, pt has been NPO in ED  Pertinent or High Risk Medications/Drips: no   If Yes, please provide details: N/A  Pending Blood Product Administration: no       You may also review the ED PT Care Timeline found under the Summary Nursing Index tab.    Recommendation    Pending orders   Plan for Discharge (if known):   Additional Comments: pt alert, oriented to self and situation. Requires  multiple reminders to follow instruction. Son at bedside. 22G IV in L AC. Pt up with standby assist    If any further questions, please call Sending RN at [phone]    Electronically signed by: Electronically signed by Darius De Oliveira RN on 7/6/2024 at 3:14 PM      Darius De Oliveira RN  07/06/24 [id number]

## 2024-07-06 NOTE — PROGRESS NOTES
Patient sitter placed at bedside.     Electronically signed by Jamil Howe RN on 7/6/2024 at 6:56 PM

## 2024-07-06 NOTE — PROGRESS NOTES
RN placed CHG patch and tegaderm over nephrostomy tube site. Patient reported tenderness to touch. Area clean and dry.     Electronically signed by Jamil Howe RN on 7/6/2024 at 5:56 PM

## 2024-07-06 NOTE — PROGRESS NOTES
Patient admitted to room 5312 from ED. Patient is A&O x 3. VSS. Patient oriented to the room all safety measures in place. Patient given IS and SCDs at this time. Admission orders released and patient 4 eyes completed. Admission documentation completed. No other needs are noted at this time.    [x] Bed alarm on and cord plugged into wall  [x] Bed in lowest position  [x] Call light and bedside table within reach  [x] Patient educated on all safety measures  []Oxygen connected to wall (if applicable)     Nurse 1 Esignature: Electronically signed by Jamil Howe RN on 7/6/24 at 5:02 PM EDT  Nurse 2 Esignature: Electronically signed by Pablo Tolbert RN on 7/6/24 at 5:26 PM EDT

## 2024-07-06 NOTE — PLAN OF CARE
Consult received. Patient well known to urology service. CT scan shows hydronephrosis despite her PCN in place. Recommend IR consult. Patient to be admitted for treatment of her UTI. We will see tomorrow for full consult.

## 2024-07-07 LAB
ANION GAP SERPL CALCULATED.3IONS-SCNC: 9 MMOL/L (ref 3–16)
BUN SERPL-MCNC: 27 MG/DL (ref 7–20)
CALCIUM SERPL-MCNC: 8.5 MG/DL (ref 8.3–10.6)
CHLORIDE SERPL-SCNC: 113 MMOL/L (ref 99–110)
CO2 SERPL-SCNC: 20 MMOL/L (ref 21–32)
CREAT SERPL-MCNC: 1.4 MG/DL (ref 0.6–1.2)
GFR SERPLBLD CREATININE-BSD FMLA CKD-EPI: 37 ML/MIN/{1.73_M2}
GLUCOSE SERPL-MCNC: 90 MG/DL (ref 70–99)
POTASSIUM SERPL-SCNC: 4.2 MMOL/L (ref 3.5–5.1)
SODIUM SERPL-SCNC: 142 MMOL/L (ref 136–145)

## 2024-07-07 PROCEDURE — 2580000003 HC RX 258: Performed by: INTERNAL MEDICINE

## 2024-07-07 PROCEDURE — 80048 BASIC METABOLIC PNL TOTAL CA: CPT

## 2024-07-07 PROCEDURE — 36415 COLL VENOUS BLD VENIPUNCTURE: CPT

## 2024-07-07 PROCEDURE — 6370000000 HC RX 637 (ALT 250 FOR IP): Performed by: INTERNAL MEDICINE

## 2024-07-07 PROCEDURE — 6360000002 HC RX W HCPCS: Performed by: INTERNAL MEDICINE

## 2024-07-07 PROCEDURE — 1200000000 HC SEMI PRIVATE

## 2024-07-07 RX ADMIN — SODIUM CHLORIDE, PRESERVATIVE FREE 10 ML: 5 INJECTION INTRAVENOUS at 21:05

## 2024-07-07 RX ADMIN — SODIUM CHLORIDE: 9 INJECTION, SOLUTION INTRAVENOUS at 02:38

## 2024-07-07 RX ADMIN — QUETIAPINE FUMARATE 50 MG: 25 TABLET ORAL at 21:04

## 2024-07-07 RX ADMIN — ACETAMINOPHEN 500 MG: 500 TABLET ORAL at 21:04

## 2024-07-07 RX ADMIN — CEFEPIME 1000 MG: 1 INJECTION, POWDER, FOR SOLUTION INTRAMUSCULAR; INTRAVENOUS at 16:47

## 2024-07-07 NOTE — PROGRESS NOTES
V2.0  [facility] Hospitalist Progress Note      Name:  Portia Waggoner /Age/Sex: 1940  (84 y.o. female)   MRN & CSN:  [id number] & [id number] Encounter Date/Time: 2024 12:25 PM EDT    Location:  [facility]/5309-01 PCP: Shahriar Hamm MD       Hospital Day: 2    Assessment and Plan:   Portia Waggoner is a 84 y.o. female with pmh of  who presents with Malfunction of nephrostomy tube (HCC)      Plan:    #Worsening mental status with intermittent combativeness  #Alzheimer's dementia with behavioral changes at times, especially when patient gets a urine infection  #UTI, prior history of MDRO  #Malfunctioning right-sided nephrostomy tube  #ABNER on CKD  #Soft blood pressure with sinus bradycardia     Plan:  Continue on cefepime every 8 hourly  Follow urine cultures  Patient received 1 L of LR in ED  Urology and IR consulted by ED  Monitor renal function, electrolytes  Monitor blood pressure closely  Seroquel at night (at home the son gives 50 mg of Seroquel with 500 mg of Tylenol at 8 PM and 25 mg of Seroquel during the daytime if patient is not drowsy)  DVT prophylaxis with SCD pending IR intervention to replace the right nephrostomy tube  Supportive therapy    Diet ADULT DIET; Regular   DVT Prophylaxis [] Lovenox, []  Heparin, [] SCDs, [] Ambulation,  [] Eliquis, [] Xarelto  [] Coumadin   Code Status Full Code   Disposition From:   Expected Disposition:   Estimated Date of Discharge:   Patient requires continued admission due to    Surrogate Decision Maker/ POA      Personally reviewed Lab Studies and Imaging         Subjective:     Chief Complaint: urology problem (Patient presents to ED with report of no drainage from her right nephrostomy tube for several days. Denies any associated pain, reports is urinating normally.)       Portia Waggoner is a 84 y.o. female who presents with combativeness as well as occluded right nephrostomy tube.     Seen and examined in the morning.  Patient is alert and conversational.  She  AND PELVIS WITHOUT CONTRAST HISTORY: Malfunctioning nephrostomy tube TECHNICAL FACTORS: Noncontrast axial images were obtained through the abdomen and pelvis. Underexposure controls were utilized during the CT examination to meet ALARA standards for radiation dose reduction. COMPARISON: 11/13/2023 FINDINGS: Lung bases appear clear of acute infiltrate or effusion. Coronary calcification is present. Evaluation is limited due to lack of contrast. If there is concern of pathology other than stone disease, a post contrast study should be considered. Liver shows uniform attenuation given limits of noncontrast evaluation. No gallstones are identified. Spleen is grossly within normal limits for size. Pancreas shows no gross abnormality given limits of noncontrast study. Adrenal glands are within normal limits. Percutaneous nephrostomy catheter is seen on the right. This appears in appropriate position. There remains moderate hydronephrosis however. No ureteral calcification or significant dilation is seen. No gross renal mass lesion is seen. If there is concern of parenchymal renal lesion, a post contrast study should be considered. No gross retroperitoneal adenopathy is seen. Bowel gas pattern is nonspecific with no evidence of obstruction or free air seen. No periappendiceal inflammatory change is seen. Bladder and distal ureters are grossly unremarkable. No free fluid is seen. No gross evidence of acute inflammatory process is seen. No acute osseous abnormality is seen.     Right-sided percutaneous nephrostomy catheter appears grossly in appropriate position. There remains moderate hydronephrosis on the right. Coronary calcification. Otherwise no acute abnormality identified. Electronically signed by Izaiah Porter      Electronically signed by Nikole Lim MD on 7/7/2024 at 12:25 PM

## 2024-07-07 NOTE — CONSULTS
Urology Attending Consult Note      Reason for Consultation: Possible nephrostomy malfunction    History: 83 y.o. 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 Had attempt at R antegrade stent placement with IR on  that was unsuccessful. She has had a few instances where she cuts her tubing due to confusion and has to have tubes replaced. She was seen in the ER 24 after her martinez through the fistula fell out. ER replaced her catheter through the urethra. We removed her urethral catheter and she has been able to void since. Her fistula healed on its own. She presented to the ER yesterday with no output seen from her nephrostomy tube for 2 days. CT scan performed showing moderate hydronephrosis despite PCN being in place. UA showing infection, culture growing Enterobacter. Cr 2.0 Afebrile. She was admitted for further management.     Family History, Social History, Review of Systems:  Reviewed and agreed to as per chart    Vitals:  /62   Pulse 74   Temp 98.3 °F (36.8 °C) (Oral)   Resp 16   Ht 1.524 m (5')   Wt 47.7 kg (105 lb 2.6 oz)   SpO2 100%   BMI 20.54 kg/m²   Temp  Av.1 °F (36.7 °C)  Min: 97.6 °F (36.4 °C)  Max: 98.5 °F (36.9 °C)  No intake or output data in the 24 hours ending 24 1335      Physical:  Well developed, well nourished in no acute distress  Mood indicates no abnormalities. Pt doesn’t appear depressed  Neck is supple, trachea is midline  Respiratory effort is normal  Cardiovascular show no extremity swelling  PCN seen from R flank. No output in tubing      Labs:  WBC:    Lab Results   Component Value Date/Time    WBC 6.9 2024 02:30 PM     Hemoglobin/Hematocrit:    Lab Results   Component Value Date/Time    HGB 12.5 2024 02:30 PM    HCT 38.0 2024 02:30 PM

## 2024-07-07 NOTE — PLAN OF CARE
Problem: Discharge Planning  Goal: Discharge to home or other facility with appropriate resources  Outcome: Progressing  Flowsheets (Taken 7/7/2024 1541)  Discharge to home or other facility with appropriate resources:   Identify barriers to discharge with patient and caregiver   Arrange for needed discharge resources and transportation as appropriate   Identify discharge learning needs (meds, wound care, etc)     Problem: Risk for Elopement  Goal: Patient will not exit the unit/facility without proper excort  Outcome: Progressing  Flowsheets (Taken 7/7/2024 1541)  Nursing Interventions for Elopement Risk:   Assist with personal care needs such as toileting, eating, dressing, as needed to reduce the risk of wandering   Collaborate with family members/caregivers to mitigate the elopement risk   Communicate/escalate to charge nurse the risk of elopement     Problem: Safety - Adult  Goal: Free from fall injury  Outcome: Progressing  Flowsheets (Taken 7/7/2024 1541)  Free From Fall Injury:   Based on caregiver fall risk screen, instruct family/caregiver to ask for assistance with transferring infant if caregiver noted to have fall risk factors   Instruct family/caregiver on patient safety  Note: Sitter at bedside        Problem: ABCDS Injury Assessment  Goal: Absence of physical injury  Outcome: Progressing  Flowsheets (Taken 7/7/2024 1541)  Absence of Physical Injury: Implement safety measures based on patient assessment

## 2024-07-07 NOTE — PROGRESS NOTES
VSS, A&O, baseline dementia. Pt denies pain. Neph drain CDI but no output, pt ambulatory and voiding to the bathroom x1 SBA. IVF infused, pt tolerating diet. Fall precaution in place and call light was within reach.

## 2024-07-07 NOTE — PROGRESS NOTES
[facility] - Clinical Pharmacy Note - Renal Dosing and Extended Infusion Beta-Lactam Adjustment    Cefepime ordered for treatment of UTI. Per [facility] Renal Dose Adjustment Policy and Extended Infusion Beta-Lactam Policy, Cefepime 1g q24h EI has been ordered.    Estimated Creatinine Clearance: Estimated Creatinine Clearance: 15 mL/min (A) (based on SCr of 2 mg/dL (H)).  Dialysis Status, ABNER, CKD: ABNER on CKD  BMI: Body mass index is 20.54 kg/m².    Rationale for Adjustment: Agent is renally eliminated and demonstrates time-dependent effect on bacterial eradication. Extended-infusion dosing strategy aims to enhance microbiologic and clinical efficacy.    Pharmacy will continue to monitor renal function, cultures and sensitivities (where available) and adjust dose as necessary.      Please call with any questions.    Yessenia Villegas, [facility] PharmD, [facility] 7/6/2024 9:15 PM  [phone]

## 2024-07-08 ENCOUNTER — HOSPITAL ENCOUNTER (INPATIENT)
Dept: INTERVENTIONAL RADIOLOGY/VASCULAR | Age: 84
Discharge: HOME | End: 2024-07-10
Attending: STUDENT IN AN ORGANIZED HEALTH CARE EDUCATION/TRAINING PROGRAM
Payer: MEDICARE

## 2024-07-08 LAB
BACTERIA UR CULT: ABNORMAL
ORGANISM: ABNORMAL

## 2024-07-08 PROCEDURE — 6360000002 HC RX W HCPCS: Performed by: INTERNAL MEDICINE

## 2024-07-08 PROCEDURE — 1200000000 HC SEMI PRIVATE

## 2024-07-08 PROCEDURE — 2580000003 HC RX 258: Performed by: INTERNAL MEDICINE

## 2024-07-08 PROCEDURE — 6370000000 HC RX 637 (ALT 250 FOR IP): Performed by: INTERNAL MEDICINE

## 2024-07-08 RX ADMIN — PIPERACILLIN AND TAZOBACTAM 3375 MG: 3; .375 INJECTION, POWDER, LYOPHILIZED, FOR SOLUTION INTRAVENOUS at 17:34

## 2024-07-08 RX ADMIN — ACETAMINOPHEN 500 MG: 500 TABLET ORAL at 21:12

## 2024-07-08 RX ADMIN — PIPERACILLIN AND TAZOBACTAM 3375 MG: 3; .375 INJECTION, POWDER, LYOPHILIZED, FOR SOLUTION INTRAVENOUS at 10:51

## 2024-07-08 RX ADMIN — SODIUM CHLORIDE, PRESERVATIVE FREE 10 ML: 5 INJECTION INTRAVENOUS at 21:19

## 2024-07-08 RX ADMIN — QUETIAPINE FUMARATE 50 MG: 25 TABLET ORAL at 21:12

## 2024-07-08 NOTE — PROGRESS NOTES
Brief IR Note    Patient brought down for PCN exchange. Uncooperative, demanding to talk to son. Please contact IR when patient willing to proceed.    Issa Oconnor MD  07/08/24  3:13 PM

## 2024-07-08 NOTE — PROGRESS NOTES
Urology Attending Progress Note      Subjective: States she feels good. No  complaints     Vitals:  BP (!) 107/51   Pulse 66   Temp 97.3 °F (36.3 °C) (Oral)   Resp 16   Ht 1.524 m (5')   Wt 47.7 kg (105 lb 2.6 oz)   SpO2 97%   BMI 20.54 kg/m²   Temp  Av °F (36.7 °C)  Min: 97.3 °F (36.3 °C)  Max: 98.4 °F (36.9 °C)    Intake/Output Summary (Last 24 hours) at 2024 1008  Last data filed at 2024 0808  Gross per 24 hour   Intake 0 ml   Output --   Net 0 ml       Exam: No output noted in her PCN    Labs:  WBC:    Lab Results   Component Value Date/Time    WBC 6.9 2024 02:30 PM     Hemoglobin/Hematocrit:    Lab Results   Component Value Date/Time    HGB 12.5 2024 02:30 PM    HCT 38.0 2024 02:30 PM     BMP:    Lab Results   Component Value Date/Time     2024 05:01 AM    K 4.2 2024 05:01 AM     2024 05:01 AM    CO2 20 2024 05:01 AM    BUN 27 2024 05:01 AM    CREATININE 1.4 2024 05:01 AM    CALCIUM 8.5 2024 05:01 AM    GFRAA 32 2022 10:21 AM    GFRAA >60 2011 11:09 AM    LABGLOM 37 2024 05:01 AM    LABGLOM 31 2024 06:20 AM     PT/INR:    Lab Results   Component Value Date/Time    PROTIME 14.8 2023 07:39 AM    INR 1.40 2023 09:55 AM     PTT:  No results found for: \"APTT\"[APTT    Urine Culture:  Enterobacter     Antibiotic Therapy:  Maxipime    Imaging: IMPRESSION:     Right-sided percutaneous nephrostomy catheter appears grossly in appropriate  position. There remains moderate hydronephrosis on the right.     Coronary calcification.     Otherwise no acute abnormality identified.      Impression/Plan:  85yo F with dementia, R PCN for UPJ obstruction and history of vesicocutaneous fistula. She is now admitted for malfunction of her nephrostomy tube.     -No new complaints  -NPO today for replacement of PCN with IR  -Please call with any questions       GURWINDER Chacon

## 2024-07-08 NOTE — PROGRESS NOTES
\"ALT\", \"BILITOT\", \"ALKPHOS\" in the last 72 hours.    Invalid input(s): \"ALB\"  Lipids:   Lab Results   Component Value Date/Time    CHOL 217 09/03/2022 10:21 AM    HDL 44 09/03/2022 10:21 AM    HDL 64 11/08/2011 11:09 AM    TRIG 109 09/03/2022 10:21 AM     Hemoglobin A1C:   Lab Results   Component Value Date/Time    LABA1C 6.7 08/02/2023 09:07 AM     TSH:   Lab Results   Component Value Date/Time    TSH 1.55 11/23/2023 07:47 PM     Troponin: No results found for: \"TROPONINT\"  Lactic Acid: No results for input(s): \"LACTA\" in the last 72 hours.  BNP: No results for input(s): \"PROBNP\" in the last 72 hours.  UA:  Lab Results   Component Value Date/Time    NITRU POSITIVE 07/06/2024 02:30 PM    COLORU Yellow 07/06/2024 02:30 PM    PHUR 6.0 07/06/2024 02:30 PM    PHUR 6.5 03/21/2024 07:49 PM    WBCUA  07/06/2024 02:30 PM    RBCUA 3-4 07/06/2024 02:30 PM    MUCUS 2+ 11/13/2023 12:02 PM    YEAST Present 12/15/2023 12:42 PM    BACTERIA 2+ 07/06/2024 02:30 PM    CLARITYU Clear 07/06/2024 02:30 PM    LEUKOCYTESUR MODERATE 07/06/2024 02:30 PM    UROBILINOGEN 0.2 07/06/2024 02:30 PM    BILIRUBINUR SMALL 07/06/2024 02:30 PM    BLOODU SMALL 07/06/2024 02:30 PM    GLUCOSEU Negative 07/06/2024 02:30 PM    KETUA Negative 07/06/2024 02:30 PM     Urine Cultures:   Lab Results   Component Value Date/Time    LABURIN >100,000 CFU/ml 07/06/2024 02:30 PM     Blood Cultures:   Lab Results   Component Value Date/Time    BC No Growth after 4 days of incubation. 03/21/2024 07:13 PM     Lab Results   Component Value Date/Time    BLOODCULT2 No Growth after 4 days of incubation. 03/21/2024 07:43 PM     Organism:   Lab Results   Component Value Date/Time    ORG Enterobacter cloacae complex 07/06/2024 02:30 PM         Electronically signed by Christen Jung MD on 7/8/2024 at 10:02 AM

## 2024-07-08 NOTE — CARE COORDINATION
Case Management Assessment  Initial Evaluation    Date/Time of Evaluation: 7/8/2024 3:40 PM  Assessment Completed by: Krissy Curry RN    If patient is discharged prior to next notation, then this note serves as note for discharge by case management.    Patient Name: Portia Waggoner                   YOB: 1940  Diagnosis: Malfunction of nephrostomy tube (HCC) [T83.098A]  Urinary tract infection without hematuria, site unspecified [N39.0]                   Date / Time: 7/6/2024  1:16 PM    Patient Admission Status: Inpatient   Readmission Risk (Low < 19, Mod (19-27), High > 27): Readmission Risk Score: 14.6    Current PCP: Shahriar Hamm MD  PCP verified by CM? Yes    Chart Reviewed: Yes      History Provided by: Patient, Medical Record  Patient Orientation: Alert and Oriented, Person, Place    Patient Cognition: Dementia / Early Alzheimer's    Hospitalization in the last 30 days (Readmission):  No    If yes, Readmission Assessment in  Navigator will be completed.    Advance Directives:      Code Status: Full Code   Patient's Primary Decision Maker is: Legal Next of Kin      Discharge Planning:    Patient lives with: Children Type of Home: House  Primary Care Giver: Self  Patient Support Systems include: Children   Current Financial resources:    Current community resources:    Current services prior to admission: None            Current DME:              Type of Home Care services:  None    ADLS  Prior functional level: Assistance with the following:, Mobility, Shopping, Housework, Cooking  Current functional level: Housework, Cooking, Shopping, Mobility    PT AM-PAC:   /24  OT AM-PAC:   /24    Family can provide assistance at DC: Yes  Would you like Case Management to discuss the discharge plan with any other family members/significant others, and if so, who? Yes (family)  Plans to Return to Present Housing: Yes  Other Identified Issues/Barriers to RETURNING to current housing: pain  Potential

## 2024-07-08 NOTE — PROGRESS NOTES
VSS, denies pain, baseline dementia. Neph tube in place with no output. Pt ambulatory and voiding to the bathroom with x1 SBA. IV abx infused. Sitter in place. Bed alarm on, pt wearing non-skid socks, bed locked and at lowest position.

## 2024-07-09 ENCOUNTER — ANESTHESIA EVENT (OUTPATIENT)
Dept: INTERVENTIONAL RADIOLOGY/VASCULAR | Age: 84
End: 2024-07-09
Payer: MEDICARE

## 2024-07-09 LAB
ANION GAP SERPL CALCULATED.3IONS-SCNC: 11 MMOL/L (ref 3–16)
BUN SERPL-MCNC: 17 MG/DL (ref 7–20)
CALCIUM SERPL-MCNC: 8.7 MG/DL (ref 8.3–10.6)
CHLORIDE SERPL-SCNC: 108 MMOL/L (ref 99–110)
CO2 SERPL-SCNC: 22 MMOL/L (ref 21–32)
CREAT SERPL-MCNC: 1.6 MG/DL (ref 0.6–1.2)
DEPRECATED RDW RBC AUTO: 14.5 % (ref 12.4–15.4)
GFR SERPLBLD CREATININE-BSD FMLA CKD-EPI: 31 ML/MIN/{1.73_M2}
GLUCOSE SERPL-MCNC: 101 MG/DL (ref 70–99)
HCT VFR BLD AUTO: 32.3 % (ref 36–48)
HGB BLD-MCNC: 10.6 G/DL (ref 12–16)
MCH RBC QN AUTO: 30.1 PG (ref 26–34)
MCHC RBC AUTO-ENTMCNC: 32.9 G/DL (ref 31–36)
MCV RBC AUTO: 91.3 FL (ref 80–100)
PLATELET # BLD AUTO: 198 K/UL (ref 135–450)
PMV BLD AUTO: 8 FL (ref 5–10.5)
POTASSIUM SERPL-SCNC: 4.2 MMOL/L (ref 3.5–5.1)
RBC # BLD AUTO: 3.53 M/UL (ref 4–5.2)
SODIUM SERPL-SCNC: 141 MMOL/L (ref 136–145)
WBC # BLD AUTO: 5.6 K/UL (ref 4–11)

## 2024-07-09 PROCEDURE — 6360000002 HC RX W HCPCS: Performed by: INTERNAL MEDICINE

## 2024-07-09 PROCEDURE — 85027 COMPLETE CBC AUTOMATED: CPT

## 2024-07-09 PROCEDURE — 80048 BASIC METABOLIC PNL TOTAL CA: CPT

## 2024-07-09 PROCEDURE — 6370000000 HC RX 637 (ALT 250 FOR IP): Performed by: INTERNAL MEDICINE

## 2024-07-09 PROCEDURE — 2580000003 HC RX 258: Performed by: INTERNAL MEDICINE

## 2024-07-09 PROCEDURE — 1200000000 HC SEMI PRIVATE

## 2024-07-09 PROCEDURE — 36415 COLL VENOUS BLD VENIPUNCTURE: CPT

## 2024-07-09 RX ORDER — SODIUM CHLORIDE, SODIUM LACTATE, POTASSIUM CHLORIDE, CALCIUM CHLORIDE 600; 310; 30; 20 MG/100ML; MG/100ML; MG/100ML; MG/100ML
INJECTION, SOLUTION INTRAVENOUS CONTINUOUS
OUTPATIENT
Start: 2024-07-09

## 2024-07-09 RX ADMIN — SODIUM CHLORIDE, PRESERVATIVE FREE 10 ML: 5 INJECTION INTRAVENOUS at 09:16

## 2024-07-09 RX ADMIN — PIPERACILLIN AND TAZOBACTAM 3375 MG: 3; .375 INJECTION, POWDER, LYOPHILIZED, FOR SOLUTION INTRAVENOUS at 03:26

## 2024-07-09 RX ADMIN — PIPERACILLIN AND TAZOBACTAM 3375 MG: 3; .375 INJECTION, POWDER, LYOPHILIZED, FOR SOLUTION INTRAVENOUS at 09:18

## 2024-07-09 RX ADMIN — ACETAMINOPHEN 500 MG: 500 TABLET ORAL at 21:27

## 2024-07-09 RX ADMIN — PIPERACILLIN AND TAZOBACTAM 3375 MG: 3; .375 INJECTION, POWDER, LYOPHILIZED, FOR SOLUTION INTRAVENOUS at 21:29

## 2024-07-09 RX ADMIN — QUETIAPINE FUMARATE 50 MG: 25 TABLET ORAL at 21:27

## 2024-07-09 RX ADMIN — QUETIAPINE FUMARATE 25 MG: 25 TABLET ORAL at 09:13

## 2024-07-09 NOTE — PROGRESS NOTES
Urology Attending Progress Note      Subjective: She was unable to tolerate the PCN exchange yesterday with IR. No new complaints    Vitals:  BP (!) 124/57   Pulse 67   Temp 97.8 °F (36.6 °C) (Oral)   Resp 16   Ht 1.524 m (5')   Wt 47.7 kg (105 lb 2.6 oz)   SpO2 97%   BMI 20.54 kg/m²   Temp  Av.7 °F (36.5 °C)  Min: 97.3 °F (36.3 °C)  Max: 97.9 °F (36.6 °C)    Intake/Output Summary (Last 24 hours) at 2024 1017  Last data filed at 2024 0933  Gross per 24 hour   Intake 355 ml   Output 1 ml   Net 354 ml         Exam: Laying in bed, sitter present in room    Labs:  WBC:    Lab Results   Component Value Date/Time    WBC 6.9 2024 02:30 PM     Hemoglobin/Hematocrit:    Lab Results   Component Value Date/Time    HGB 12.5 2024 02:30 PM    HCT 38.0 2024 02:30 PM     BMP:    Lab Results   Component Value Date/Time     2024 05:01 AM    K 4.2 2024 05:01 AM     2024 05:01 AM    CO2 20 2024 05:01 AM    BUN 27 2024 05:01 AM    CREATININE 1.4 2024 05:01 AM    CALCIUM 8.5 2024 05:01 AM    GFRAA 32 2022 10:21 AM    GFRAA >60 2011 11:09 AM    LABGLOM 37 2024 05:01 AM    LABGLOM 31 2024 06:20 AM     PT/INR:    Lab Results   Component Value Date/Time    PROTIME 14.8 2023 07:39 AM    INR 1.40 2023 09:55 AM     PTT:  No results found for: \"APTT\"[APTT    Urine Culture:  Enterobacter     Antibiotic Therapy:  Maxipime    Imaging: IMPRESSION:     Right-sided percutaneous nephrostomy catheter appears grossly in appropriate  position. There remains moderate hydronephrosis on the right.     Coronary calcification.     Otherwise no acute abnormality identified.      Impression/Plan:  83yo F with dementia, R PCN for UPJ obstruction and history of vesicocutaneous fistula. She is now admitted for malfunction of her nephrostomy tube.     -She could not tolerate IR procedure yesterday  -Only time available with anesthesia

## 2024-07-09 NOTE — CARE COORDINATION
Patient was unable to get the neph tube exchange and will need to be NPO at [facility] for procedure  tomorrow with anesthesia.     Patient is from home with son and active with [facility] and [facility] in the past.     Electronically signed by Rizwana Hanks RN on 7/9/2024 at 2:48 PM  [phone]

## 2024-07-09 NOTE — PROGRESS NOTES
[facility] - Clinical Pharmacy Note - Extended Infusion Beta-Lactam Adjustment    Piperacillin/Tazobactam ordered for treatment of UTI. Per [facility] Extended Infusion Beta-Lactam Policy, Zosyn dose will be changed to 3375mg mg EI q12h as est CrCl is now < 20 mL/min.     Estimated Creatinine Clearance: Estimated Creatinine Clearance: 18 mL/min (A) (based on SCr of 1.6 mg/dL (H)).  Dialysis Status, ABNER, CKD: ABNER on CKD 3 - baseline SCr ~1.4  BMI: Body mass index is 20.54 kg/m².    Rationale for Adjustment: Agent is renally eliminated and demonstrates time-dependent effect on bacterial eradication. Extended-infusion dosing strategy aims to enhance microbiologic and clinical efficacy.    Pharmacy will continue to monitor renal function, cultures and sensitivities (where available) and adjust dose as necessary.      Please call with questions--  Thanks--  Diya Sawyer, HardikD, BCPS, BCGP  [id number] ([facility])   7/9/2024 1:27 PM

## 2024-07-09 NOTE — PROGRESS NOTES
VSS, dementia base line, denies pain. Pt has been tolerating her diet, NPO midnight. Pt ambulatory and voiding to the bathroom. IV abx infused. Sitter in the room. Fall precaution in place, questions were answered, needs were met during this shift.

## 2024-07-09 NOTE — PROGRESS NOTES
Physician Progress Note      PATIENT:               AGATA DAO  CSN #:                  [id number]  :                       1940  ADMIT DATE:       2024 1:16 PM  DISCH DATE:  RESPONDING  PROVIDER #:        Christen Pichardo MD          QUERY TEXT:    Pt admitted with UTI.  Pt noted to have malfunctioning nephrostomy tube. If   possible, please document in the progress notes and discharge summary if you   are evaluating and/or treating any of the following:    The medical record reflects the following:  Risk Factors: 84 year old female with UTI, malfunctioning nephrostomy tube  Clinical Indicators:  UC- Enterobacter cloacae complex.  H&P-   Imaging that was interpreted personally includes CT abdomen and pelvis without   contrast and results right moderate hydronephrosis and right-sided   percutaneous nephrostomy catheter in place.   progress note- #UTI,   prior history of MDRO... Malfunctioning right-sided nephrostomy tube...   presents with combativeness as well as occluded right nephrostomy tube  Treatment: Labs, imaging, Urology consult, cefepime, Zosyn  Options provided:  -- UTI due to nephrostomy tube  -- UTI not due to nephrostomy tube  -- Other - I will add my own diagnosis  -- Disagree - Not applicable / Not valid  -- Disagree - Clinically unable to determine / Unknown  -- Refer to Clinical Documentation Reviewer    PROVIDER RESPONSE TEXT:    UTI is due to nephrostomy tube.    Query created by: Maribell Hanks on 2024 11:18 AM      Electronically signed by:  Christen Pichardo MD 2024 4:14 PM

## 2024-07-09 NOTE — PROGRESS NOTES
V2.0    [facility] Progress Note      Name:  Portia Waggoner /Age/Sex: 1940  (84 y.o. female)   MRN & CSN:  [id number] & [id number] Encounter Date/Time: 2024 10:02 AM EDT   Location:  5309/5309-01 PCP: Shahriar Hamm MD     Attending:Christen Jung MD       Hospital Day: 4    Assessment and Recommendations   Portia Waggoner is a 84 y.o. 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  Patient with colovesical fistula has healed but now presented with no output from her nephrostomy tube for 2 days.  CT showed moderate hydronephrosis despite the PCN being in place and urine culture grew Enterobacter.  Patient to go for IR guided PCN exchange      Plan:     Malfunctioning nephrostomy tube-for replacement tomorrow  -Urology consult appreciated  -For IR guided nephrostomy tube exchange, yesterday IR try to exchange the tube but patient was not cooperative.    Complicated UTI  -Secondary to hydronephrosis with nonfunctioning nephrostomy tube  -Urine culture growing Enterobacter cloacae   -Got cefepime for 2 days, will switch to Zosyn ( D4/10 of antibiotic)    Acute metabolic encephalopathy in the setting of dementia-mentation seems close to baseline  -Per family son gets 50 mg of Seroquel at night and 25 mg of Seroquel in the morning depending on orientation  -Which cefepime to Zosyn to prevent further worsening of encephalopathy    ABNER on CKD stage III-a.m. labs pending   -Baseline creatinine about 1.4.  Creatinine 2 on admission.  Monitor    I spent > 55  minutes in the care of this patient.  Over 50% of that time was in face-to-face counseling regarding disease process, diagnostic testing, preventative measures, and answering patient and family questions.     Diet Diet NPO  Or  acetaminophen, 650 mg, Q6H PRN        Labs and Imaging   CT ABDOMEN PELVIS WO CONTRAST Additional Contrast? None    Result Date: 7/6/2024  CT ABDOMEN AND PELVIS WITHOUT CONTRAST HISTORY: Malfunctioning nephrostomy tube TECHNICAL FACTORS: Noncontrast axial images were obtained through the abdomen and pelvis. Underexposure controls were utilized during the CT examination to meet ALARA standards for radiation dose reduction. COMPARISON: 11/13/2023 FINDINGS: Lung bases appear clear of acute infiltrate or effusion. Coronary calcification is present. Evaluation is limited due to lack of contrast. If there is concern of pathology other than stone disease, a post contrast study should be considered. Liver shows uniform attenuation given limits of noncontrast evaluation. No gallstones are identified. Spleen is grossly within normal limits for size. Pancreas shows no gross abnormality given limits of noncontrast study. Adrenal glands are within normal limits. Percutaneous nephrostomy catheter is seen on the right. This appears in appropriate position. There remains moderate hydronephrosis however. No ureteral calcification or significant dilation is seen. No gross renal mass lesion is seen. If there is concern of parenchymal renal lesion, a post contrast study should be considered. No gross retroperitoneal adenopathy is seen. Bowel gas pattern is nonspecific with no evidence of obstruction or free air seen. No periappendiceal inflammatory change is seen. Bladder and distal ureters are grossly unremarkable. No free fluid is seen. No gross evidence of acute inflammatory process is seen. No acute osseous abnormality is seen.     Right-sided percutaneous nephrostomy catheter appears grossly in appropriate position. There remains moderate hydronephrosis on the right. Coronary calcification. Otherwise no acute abnormality identified. Electronically signed by Izaiah Porter      CBC:   Recent Labs     07/06/24  1430   WBC 6.9

## 2024-07-09 NOTE — PLAN OF CARE
Problem: Discharge Planning  Goal: Discharge to home or other facility with appropriate resources  Outcome: Progressing  Flowsheets (Taken 7/9/2024 1141)  Discharge to home or other facility with appropriate resources:   Identify barriers to discharge with patient and caregiver   Identify discharge learning needs (meds, wound care, etc)   Arrange for needed discharge resources and transportation as appropriate     Problem: Risk for Elopement  Goal: Patient will not exit the unit/facility without proper excort  Outcome: Progressing  Flowsheets  Taken 7/9/2024 1141  Nursing Interventions for Elopement Risk: Assist with personal care needs such as toileting, eating, dressing, as needed to reduce the risk of wandering  Taken 7/9/2024 0920  Nursing Interventions for Elopement Risk:   Assist with personal care needs such as toileting, eating, dressing, as needed to reduce the risk of wandering   Collaborate with family members/caregivers to mitigate the elopement risk   Communicate/escalate to charge nurse the risk of elopement   Communicate/escalate to /other team member the risk of elopement     Problem: Safety - Adult  Goal: Free from fall injury  Outcome: Progressing  Flowsheets (Taken 7/9/2024 1141)  Free From Fall Injury: Instruct family/caregiver on patient safety     Problem: ABCDS Injury Assessment  Goal: Absence of physical injury  Outcome: Progressing  Flowsheets (Taken 7/9/2024 1141)  Absence of Physical Injury: Implement safety measures based on patient assessment

## 2024-07-10 ENCOUNTER — ANESTHESIA (OUTPATIENT)
Dept: INTERVENTIONAL RADIOLOGY/VASCULAR | Age: 84
End: 2024-07-10
Payer: MEDICARE

## 2024-07-10 ENCOUNTER — HOSPITAL ENCOUNTER (INPATIENT)
Dept: INTERVENTIONAL RADIOLOGY/VASCULAR | Age: 84
Discharge: HOME | End: 2024-07-12
Attending: STUDENT IN AN ORGANIZED HEALTH CARE EDUCATION/TRAINING PROGRAM
Payer: MEDICARE

## 2024-07-10 VITALS
RESPIRATION RATE: 20 BRPM | HEART RATE: 81 BPM | OXYGEN SATURATION: 98 % | TEMPERATURE: 98.2 F | DIASTOLIC BLOOD PRESSURE: 74 MMHG | SYSTOLIC BLOOD PRESSURE: 148 MMHG

## 2024-07-10 PROCEDURE — 2580000003 HC RX 258: Performed by: NURSE PRACTITIONER

## 2024-07-10 PROCEDURE — C1887 CATHETER, GUIDING: HCPCS

## 2024-07-10 PROCEDURE — 1200000000 HC SEMI PRIVATE

## 2024-07-10 PROCEDURE — 6360000002 HC RX W HCPCS: Performed by: INTERNAL MEDICINE

## 2024-07-10 PROCEDURE — 2580000003 HC RX 258: Performed by: NURSE ANESTHETIST, CERTIFIED REGISTERED

## 2024-07-10 PROCEDURE — 2580000003 HC RX 258: Performed by: INTERNAL MEDICINE

## 2024-07-10 PROCEDURE — 3700000000 HC ANESTHESIA ATTENDED CARE

## 2024-07-10 PROCEDURE — 6370000000 HC RX 637 (ALT 250 FOR IP): Performed by: INTERNAL MEDICINE

## 2024-07-10 PROCEDURE — 0T25X0Z CHANGE DRAINAGE DEVICE IN KIDNEY, EXTERNAL APPROACH: ICD-10-PCS | Performed by: RADIOLOGY

## 2024-07-10 PROCEDURE — C1729 CATH, DRAINAGE: HCPCS

## 2024-07-10 PROCEDURE — 50435 EXCHANGE NEPHROSTOMY CATH: CPT

## 2024-07-10 PROCEDURE — 3700000001 HC ADD 15 MINUTES (ANESTHESIA)

## 2024-07-10 PROCEDURE — 6360000002 HC RX W HCPCS: Performed by: NURSE ANESTHETIST, CERTIFIED REGISTERED

## 2024-07-10 PROCEDURE — C1894 INTRO/SHEATH, NON-LASER: HCPCS

## 2024-07-10 PROCEDURE — 7100000001 HC PACU RECOVERY - ADDTL 15 MIN

## 2024-07-10 PROCEDURE — C1769 GUIDE WIRE: HCPCS

## 2024-07-10 PROCEDURE — 2500000003 HC RX 250 WO HCPCS: Performed by: NURSE ANESTHETIST, CERTIFIED REGISTERED

## 2024-07-10 PROCEDURE — 7100000000 HC PACU RECOVERY - FIRST 15 MIN

## 2024-07-10 RX ORDER — ROCURONIUM BROMIDE 10 MG/ML
INJECTION, SOLUTION INTRAVENOUS PRN
Status: DISCONTINUED | OUTPATIENT
Start: 2024-07-10 | End: 2024-07-10 | Stop reason: SDUPTHER

## 2024-07-10 RX ORDER — CEFAZOLIN SODIUM 1 G/3ML
INJECTION, POWDER, FOR SOLUTION INTRAMUSCULAR; INTRAVENOUS PRN
Status: DISCONTINUED | OUTPATIENT
Start: 2024-07-10 | End: 2024-07-10 | Stop reason: SDUPTHER

## 2024-07-10 RX ORDER — SODIUM CHLORIDE, SODIUM LACTATE, POTASSIUM CHLORIDE, CALCIUM CHLORIDE 600; 310; 30; 20 MG/100ML; MG/100ML; MG/100ML; MG/100ML
INJECTION, SOLUTION INTRAVENOUS CONTINUOUS PRN
Status: DISCONTINUED | OUTPATIENT
Start: 2024-07-10 | End: 2024-07-10 | Stop reason: SDUPTHER

## 2024-07-10 RX ORDER — ONDANSETRON 2 MG/ML
4 INJECTION INTRAMUSCULAR; INTRAVENOUS
Status: ACTIVE | OUTPATIENT
Start: 2024-07-10 | End: 2024-07-11

## 2024-07-10 RX ORDER — HYDRALAZINE HYDROCHLORIDE 20 MG/ML
10 INJECTION INTRAMUSCULAR; INTRAVENOUS
Status: DISCONTINUED | OUTPATIENT
Start: 2024-07-10 | End: 2024-07-13 | Stop reason: HOSPADM

## 2024-07-10 RX ORDER — FENTANYL CITRATE 50 UG/ML
50 INJECTION, SOLUTION INTRAMUSCULAR; INTRAVENOUS EVERY 5 MIN PRN
Status: DISCONTINUED | OUTPATIENT
Start: 2024-07-10 | End: 2024-07-13 | Stop reason: HOSPADM

## 2024-07-10 RX ORDER — SODIUM CHLORIDE 0.9 % (FLUSH) 0.9 %
5-40 SYRINGE (ML) INJECTION PRN
Status: DISCONTINUED | OUTPATIENT
Start: 2024-07-10 | End: 2024-07-13 | Stop reason: HOSPADM

## 2024-07-10 RX ORDER — LIDOCAINE HYDROCHLORIDE 20 MG/ML
INJECTION, SOLUTION INTRAVENOUS PRN
Status: DISCONTINUED | OUTPATIENT
Start: 2024-07-10 | End: 2024-07-10 | Stop reason: SDUPTHER

## 2024-07-10 RX ORDER — LABETALOL HYDROCHLORIDE 5 MG/ML
10 INJECTION, SOLUTION INTRAVENOUS
Status: DISCONTINUED | OUTPATIENT
Start: 2024-07-10 | End: 2024-07-13 | Stop reason: HOSPADM

## 2024-07-10 RX ORDER — SODIUM CHLORIDE 9 MG/ML
INJECTION, SOLUTION INTRAVENOUS PRN
Status: DISCONTINUED | OUTPATIENT
Start: 2024-07-10 | End: 2024-07-13 | Stop reason: HOSPADM

## 2024-07-10 RX ORDER — PROPOFOL 10 MG/ML
INJECTION, EMULSION INTRAVENOUS PRN
Status: DISCONTINUED | OUTPATIENT
Start: 2024-07-10 | End: 2024-07-10 | Stop reason: SDUPTHER

## 2024-07-10 RX ORDER — SODIUM CHLORIDE 9 MG/ML
INJECTION, SOLUTION INTRAVENOUS CONTINUOUS
Status: DISCONTINUED | OUTPATIENT
Start: 2024-07-10 | End: 2024-07-11

## 2024-07-10 RX ORDER — PROCHLORPERAZINE EDISYLATE 5 MG/ML
5 INJECTION INTRAMUSCULAR; INTRAVENOUS
Status: ACTIVE | OUTPATIENT
Start: 2024-07-10 | End: 2024-07-11

## 2024-07-10 RX ORDER — FENTANYL CITRATE 50 UG/ML
INJECTION, SOLUTION INTRAMUSCULAR; INTRAVENOUS PRN
Status: DISCONTINUED | OUTPATIENT
Start: 2024-07-10 | End: 2024-07-10 | Stop reason: SDUPTHER

## 2024-07-10 RX ORDER — NALOXONE HYDROCHLORIDE 0.4 MG/ML
INJECTION, SOLUTION INTRAMUSCULAR; INTRAVENOUS; SUBCUTANEOUS PRN
Status: DISCONTINUED | OUTPATIENT
Start: 2024-07-10 | End: 2024-07-13 | Stop reason: HOSPADM

## 2024-07-10 RX ORDER — SODIUM CHLORIDE 0.9 % (FLUSH) 0.9 %
5-40 SYRINGE (ML) INJECTION EVERY 12 HOURS SCHEDULED
Status: DISCONTINUED | OUTPATIENT
Start: 2024-07-10 | End: 2024-07-13 | Stop reason: HOSPADM

## 2024-07-10 RX ORDER — OXYCODONE HYDROCHLORIDE 5 MG/1
5 TABLET ORAL
Status: ACTIVE | OUTPATIENT
Start: 2024-07-10 | End: 2024-07-11

## 2024-07-10 RX ORDER — FENTANYL CITRATE 50 UG/ML
25 INJECTION, SOLUTION INTRAMUSCULAR; INTRAVENOUS EVERY 5 MIN PRN
Status: DISCONTINUED | OUTPATIENT
Start: 2024-07-10 | End: 2024-07-13 | Stop reason: HOSPADM

## 2024-07-10 RX ADMIN — ACETAMINOPHEN 500 MG: 500 TABLET ORAL at 20:09

## 2024-07-10 RX ADMIN — PHENYLEPHRINE HYDROCHLORIDE 100 MCG: 10 INJECTION, SOLUTION INTRAVENOUS at 14:38

## 2024-07-10 RX ADMIN — SUGAMMADEX 200 MG: 100 INJECTION, SOLUTION INTRAVENOUS at 14:44

## 2024-07-10 RX ADMIN — SODIUM CHLORIDE, SODIUM LACTATE, POTASSIUM CHLORIDE, AND CALCIUM CHLORIDE: .6; .31; .03; .02 INJECTION, SOLUTION INTRAVENOUS at 14:02

## 2024-07-10 RX ADMIN — QUETIAPINE FUMARATE 25 MG: 25 TABLET ORAL at 07:46

## 2024-07-10 RX ADMIN — SODIUM CHLORIDE: 9 INJECTION, SOLUTION INTRAVENOUS at 04:42

## 2024-07-10 RX ADMIN — FENTANYL CITRATE 25 MCG: 50 INJECTION, SOLUTION INTRAMUSCULAR; INTRAVENOUS at 14:44

## 2024-07-10 RX ADMIN — PROPOFOL 100 MG: 10 INJECTION, EMULSION INTRAVENOUS at 14:11

## 2024-07-10 RX ADMIN — FENTANYL CITRATE 25 MCG: 50 INJECTION, SOLUTION INTRAMUSCULAR; INTRAVENOUS at 14:08

## 2024-07-10 RX ADMIN — QUETIAPINE FUMARATE 50 MG: 25 TABLET ORAL at 20:09

## 2024-07-10 RX ADMIN — ONDANSETRON 4 MG: 2 INJECTION INTRAMUSCULAR; INTRAVENOUS at 14:10

## 2024-07-10 RX ADMIN — LIDOCAINE HYDROCHLORIDE 50 MG: 20 INJECTION, SOLUTION INTRAVENOUS at 14:10

## 2024-07-10 RX ADMIN — PIPERACILLIN AND TAZOBACTAM 3375 MG: 3; .375 INJECTION, POWDER, LYOPHILIZED, FOR SOLUTION INTRAVENOUS at 20:14

## 2024-07-10 RX ADMIN — SODIUM CHLORIDE: 9 INJECTION, SOLUTION INTRAVENOUS at 10:45

## 2024-07-10 RX ADMIN — PHENYLEPHRINE HYDROCHLORIDE 100 MCG: 10 INJECTION, SOLUTION INTRAVENOUS at 14:27

## 2024-07-10 RX ADMIN — CEFAZOLIN SODIUM 1 G: 1 POWDER, FOR SOLUTION INTRAMUSCULAR; INTRAVENOUS at 14:25

## 2024-07-10 RX ADMIN — PIPERACILLIN AND TAZOBACTAM 3375 MG: 3; .375 INJECTION, POWDER, LYOPHILIZED, FOR SOLUTION INTRAVENOUS at 07:46

## 2024-07-10 RX ADMIN — ROCURONIUM BROMIDE 50 MG: 10 INJECTION, SOLUTION INTRAVENOUS at 14:12

## 2024-07-10 ASSESSMENT — LIFESTYLE VARIABLES: SMOKING_STATUS: 0

## 2024-07-10 NOTE — PROGRESS NOTES
Urology Attending Progress Note      Subjective: No  complaints    Vitals:  /67   Pulse 65   Temp 97.7 °F (36.5 °C) (Oral)   Resp 18   Ht 1.524 m (5')   Wt 47.7 kg (105 lb 2.6 oz)   SpO2 98%   BMI 20.54 kg/m²   Temp  Av °F (36.7 °C)  Min: 97.7 °F (36.5 °C)  Max: 98.5 °F (36.9 °C)    Intake/Output Summary (Last 24 hours) at 7/10/2024 1001  Last data filed at 7/10/2024 0859  Gross per 24 hour   Intake 762 ml   Output 500 ml   Net 262 ml         Exam: Laying in bed, sitter present in room    Labs:  WBC:    Lab Results   Component Value Date/Time    WBC 5.6 2024 10:14 AM     Hemoglobin/Hematocrit:    Lab Results   Component Value Date/Time    HGB 10.6 2024 10:14 AM    HCT 32.3 2024 10:14 AM     BMP:    Lab Results   Component Value Date/Time     2024 10:14 AM    K 4.2 2024 10:14 AM     2024 10:14 AM    CO2 22 2024 10:14 AM    BUN 17 2024 10:14 AM    CREATININE 1.6 2024 10:14 AM    CALCIUM 8.7 2024 10:14 AM    GFRAA 32 2022 10:21 AM    GFRAA >60 2011 11:09 AM    LABGLOM 31 2024 10:14 AM    LABGLOM 31 2024 06:20 AM     PT/INR:    Lab Results   Component Value Date/Time    PROTIME 14.8 2023 07:39 AM    INR 1.40 2023 09:55 AM     PTT:  No results found for: \"APTT\"[APTT    Urine Culture:  Enterobacter     Antibiotic Therapy:  Maxipime    Imaging: IMPRESSION:     Right-sided percutaneous nephrostomy catheter appears grossly in appropriate  position. There remains moderate hydronephrosis on the right.     Coronary calcification.     Otherwise no acute abnormality identified.      Impression/Plan:  85yo F with dementia, R PCN for UPJ obstruction and history of vesicocutaneous fistula. She is now admitted for malfunction of her nephrostomy tube.     -NPO today for exchange of PCN with IR under anesthesia  -If all goes well, ok to DC from our standpoint following tube exchange  -Please call with any

## 2024-07-10 NOTE — PROGRESS NOTES
Disposition    Surrogate Decision Maker/ POA       Personally reviewed Lab Studies and Imaging     Discussed patient with IR and urology.  Patient will put nephrostomy exchange today        Subjective:     Chief Complaint: Nonfunctioning nephrostomy tube    Patient keeps asking about the procedure. Mentation seems better.   Review of Systems:      Pertinent positives and negatives discussed in HPI    Objective:     Intake/Output Summary (Last 24 hours) at 7/10/2024 1134  Last data filed at 7/10/2024 0859  Gross per 24 hour   Intake 762 ml   Output 500 ml   Net 262 ml        Vitals:   Vitals:    07/10/24 0048 07/10/24 0313 07/10/24 0706 07/10/24 1045   BP: (!) 97/56 116/64 120/67 130/69   Pulse: 73 87 65 67   Resp: 18 22 18 16   Temp: 98.2 °F (36.8 °C) 97.9 °F (36.6 °C) 97.7 °F (36.5 °C) 98 °F (36.7 °C)   TempSrc: Oral Oral Oral Oral   SpO2: 99% 96% 98% 97%   Weight:       Height:             Physical Exam:      General: NAD  Eyes: EOMI  ENT: neck supple  Cardiovascular: Regular rate.  Respiratory: Clear to auscultation  Gastrointestinal: Soft, non tender  Genitourinary: no suprapubic tenderness. Nephrostomy tube site looked ok   Musculoskeletal: No edema  Skin: warm, dry  Neuro: Alert.  Psych: Confused at baseline  Nephrostomy tube in place        Medications:   Medications:    piperacillin-tazobactam  3,375 mg IntraVENous Q12H    sodium chloride flush  5-40 mL IntraVENous 2 times per day    QUEtiapine  50 mg Oral Nightly    acetaminophen  500 mg Oral Nightly    QUEtiapine  25 mg Oral QAM      Infusions:    sodium chloride 75 mL/hr at 07/10/24 1045    sodium chloride       PRN Meds: sodium chloride flush, 5-40 mL, PRN  sodium chloride, , PRN  ondansetron, 4 mg, Q8H PRN   Or  ondansetron, 4 mg, Q6H PRN  polyethylene glycol, 17 g, Daily PRN  acetaminophen, 650 mg, Q6H PRN   Or  acetaminophen, 650 mg, Q6H PRN        Labs and Imaging   CT ABDOMEN PELVIS WO CONTRAST Additional Contrast? None    Result Date:    GLUCOSE 101*       Hepatic: No results for input(s): \"AST\", \"ALT\", \"BILITOT\", \"ALKPHOS\" in the last 72 hours.    Invalid input(s): \"ALB\"  Lipids:   Lab Results   Component Value Date/Time    CHOL 217 09/03/2022 10:21 AM    HDL 44 09/03/2022 10:21 AM    HDL 64 11/08/2011 11:09 AM    TRIG 109 09/03/2022 10:21 AM     Hemoglobin A1C:   Lab Results   Component Value Date/Time    LABA1C 6.7 08/02/2023 09:07 AM     TSH:   Lab Results   Component Value Date/Time    TSH 1.55 11/23/2023 07:47 PM     Troponin: No results found for: \"TROPONINT\"  Lactic Acid: No results for input(s): \"LACTA\" in the last 72 hours.  BNP: No results for input(s): \"PROBNP\" in the last 72 hours.  UA:  Lab Results   Component Value Date/Time    NITRU POSITIVE 07/06/2024 02:30 PM    COLORU Yellow 07/06/2024 02:30 PM    PHUR 6.0 07/06/2024 02:30 PM    PHUR 6.5 03/21/2024 07:49 PM    WBCUA  07/06/2024 02:30 PM    RBCUA 3-4 07/06/2024 02:30 PM    MUCUS 2+ 11/13/2023 12:02 PM    YEAST Present 12/15/2023 12:42 PM    BACTERIA 2+ 07/06/2024 02:30 PM    CLARITYU Clear 07/06/2024 02:30 PM    LEUKOCYTESUR MODERATE 07/06/2024 02:30 PM    UROBILINOGEN 0.2 07/06/2024 02:30 PM    BILIRUBINUR SMALL 07/06/2024 02:30 PM    BLOODU SMALL 07/06/2024 02:30 PM    GLUCOSEU Negative 07/06/2024 02:30 PM    KETUA Negative 07/06/2024 02:30 PM     Urine Cultures:   Lab Results   Component Value Date/Time    LABURIN >100,000 CFU/ml 07/06/2024 02:30 PM     Blood Cultures:   Lab Results   Component Value Date/Time    BC No Growth after 4 days of incubation. 03/21/2024 07:13 PM     Lab Results   Component Value Date/Time    BLOODCULT2 No Growth after 4 days of incubation. 03/21/2024 07:43 PM     Organism:   Lab Results   Component Value Date/Time    ORG Enterobacter cloacae complex 07/06/2024 02:30 PM         Electronically signed by Landon Shannon MD on 7/10/2024 at 11:34 AM

## 2024-07-10 NOTE — PROGRESS NOTES
PACU Transfer to Floor Note    * No procedures listed *    Current Allergies: NKA    Pt meets criteria as per Dariusz Score and ASPAN Standards to transfer to next phase of care.     No results for input(s): \"POCGLU\" in the last 72 hours.    Vitals:    07/10/24 1530   BP: (!) 148/74   Pulse: 81   Resp: 20   Temp: 98.2 °F (36.8 °C)   SpO2: 98%     Vitals within 20% of pt's admission vitals as per DARIUSZ SCORE    SpO2: 98 %    O2 Flow Rate (L/min): 2 L/min      Intake/Output Summary (Last 24 hours) at 7/10/2024 1535  Last data filed at 7/10/2024 1446  Gross per 24 hour   Intake 1162 ml   Output 501 ml   Net 661 ml       Pain assessment:  none         Patient was assessed for alterations to skin integrity. There were not alterations observed.    Is patient incontinent: no    Handoff report given at bedside.   No family in WR      7/10/2024 3:35 PM

## 2024-07-10 NOTE — PLAN OF CARE
Problem: Discharge Planning  Goal: Discharge to home or other facility with appropriate resources  7/10/2024 0929 by Evelia Raymundo RN  Outcome: Progressing  Flowsheets (Taken 7/10/2024 0929)  Discharge to home or other facility with appropriate resources:   Identify barriers to discharge with patient and caregiver   Identify discharge learning needs (meds, wound care, etc)   Arrange for needed discharge resources and transportation as appropriate    Problem: Safety - Adult  Goal: Free from fall injury  7/10/2024 0929 by Evelia Raymundo RN  Outcome: Progressing  Flowsheets (Taken 7/10/2024 0929)  Free From Fall Injury:   Instruct family/caregiver on patient safety   Based on caregiver fall risk screen, instruct family/caregiver to ask for assistance with transferring infant if caregiver noted to have fall risk factors     Problem: ABCDS Injury Assessment  Goal: Absence of physical injury  7/10/2024 0929 by Evelia Raymundo RN  Outcome: Progressing  Flowsheets (Taken 7/10/2024 0929)  Absence of Physical Injury: Implement safety measures based on patient assessment     Problem: Genitourinary - Adult  Goal: Absence of urinary retention  Outcome: Progressing  Flowsheets (Taken 7/10/2024 0930)  Absence of urinary retention:   Assess patient’s ability to void and empty bladder   Monitor intake/output and perform bladder scan as needed

## 2024-07-10 NOTE — ANESTHESIA POSTPROCEDURE EVALUATION
Department of Anesthesiology  Postprocedure Note    Patient: Portia Waggoner  MRN: [id number]  YOB: 1940  Date of evaluation: 7/10/2024    Procedure Summary       Date: 07/10/24 Room / Location: [facility] Special Procedures    Anesthesia Start: 1402 Anesthesia Stop: 1506    Procedure: IR NEPHROSTOMY EXCHANGE CATHETER Diagnosis:       Urinary tract infection without hematuria, site unspecified      Malfunction of nephrostomy tube (HCC)      (NEPHROSTOMY TUBE REPLACEMENT)    Scheduled Providers: Daquan Rodriguez MD Responsible Provider: Daquan Rodriguez MD    Anesthesia Type: general ASA Status: 3            Anesthesia Type: No value filed.    Ana María Phase I: Ana María Score: 9    Ana María Phase II:      Anesthesia Post Evaluation    Patient location during evaluation: PACU  Patient participation: complete - patient participated  Level of consciousness: awake and alert  Airway patency: patent  Nausea & Vomiting: no nausea and no vomiting  Cardiovascular status: blood pressure returned to baseline  Respiratory status: acceptable  Hydration status: euvolemic  Pain management: adequate    No notable events documented.

## 2024-07-10 NOTE — PLAN OF CARE
Problem: Discharge Planning  Goal: Discharge to home or other facility with appropriate resources  7/10/2024 0045 by Sterling Park RN  Outcome: Progressing  Flowsheets (Taken 7/9/2024 1141 by Evelia Raymundo, RN)  Discharge to home or other facility with appropriate resources:   Identify barriers to discharge with patient and caregiver   Identify discharge learning needs (meds, wound care, etc)   Arrange for needed discharge resources and transportation as appropriate  7/9/2024 1141 by Evelia Raymundo RN  Outcome: Progressing  Flowsheets (Taken 7/9/2024 1141)  Discharge to home or other facility with appropriate resources:   Identify barriers to discharge with patient and caregiver   Identify discharge learning needs (meds, wound care, etc)   Arrange for needed discharge resources and transportation as appropriate     Problem: Risk for Elopement  Goal: Patient will not exit the unit/facility without proper excort  7/10/2024 0045 by Sterling Park RN  Outcome: Progressing  Flowsheets (Taken 7/9/2024 1141 by Evelia Raymundo, RN)  Nursing Interventions for Elopement Risk: Assist with personal care needs such as toileting, eating, dressing, as needed to reduce the risk of wandering  7/9/2024 1141 by Evelia Raymundo RN  Outcome: Progressing  Flowsheets  Taken 7/9/2024 1141  Nursing Interventions for Elopement Risk: Assist with personal care needs such as toileting, eating, dressing, as needed to reduce the risk of wandering  Taken 7/9/2024 0920  Nursing Interventions for Elopement Risk:   Assist with personal care needs such as toileting, eating, dressing, as needed to reduce the risk of wandering   Collaborate with family members/caregivers to mitigate the elopement risk   Communicate/escalate to charge nurse the risk of elopement   Communicate/escalate to /other team member the risk of elopement     Problem: Safety - Adult  Goal: Free from fall injury  7/10/2024 0045 by Sterling Park, GENNY  Outcome:

## 2024-07-10 NOTE — PROCEDURES
PROCEDURE NOTE  Date: 7/10/2024   Name: Portia Waggoner  YOB: 1940    Procedures      IR Brief Postoperative Note    Portia Waggoner  YOB: 1940  [id number]    Pre-operative Diagnosis: R pcn exchange    Post-operative Diagnosis: Same    Procedure: urinary obstruction    Anesthesia: gen    Surgeons/Assistants: shellie    Estimated Blood Loss: Minimal    Complications: none    Specimens: were not obtained    See full procedure dictation to follow      Kristian Sanchez MD MD  7/10/2024

## 2024-07-10 NOTE — PROGRESS NOTES
Pt A+O x1. Pt oriented to self only and got more disoriented as the shift progressed. Pt can sometimes be reoriented. Pt nephrostomy has had no output. MD aware. Standard and fall precautions in place. All needs met at this time. Due to pt confusion RN rounded on pt more frequently.     Electronically signed by Sterling Park RN on 7/10/2024 at 4:32 AM

## 2024-07-10 NOTE — CONSULTS
IR consult complete, patient underwent successful right pcn exchange today 7/10 with Dr Sanchez. See procedure dictation for additional information.

## 2024-07-10 NOTE — PROGRESS NOTES
Pt getting increasingly agitated and confused as the night progresses. Pt unable to be reoriented at this time. Calming music started.    Electronically signed by Sterling Park RN on 7/10/2024 at 4:53 AM

## 2024-07-10 NOTE — PROGRESS NOTES
Patient arrived from IR to PACU # 5 s/p  IR NEPHROSTOMY EXCHANGE CATHETER. Attached to PACU monitoring device report received from CRNA who stated no problems intraoperatively. Patient pleasant and cooperative on arrival, sitter @ bedside from inpatient room.

## 2024-07-10 NOTE — PROGRESS NOTES
Pt had low urine output of 200mL and bladder scan showed 16mL urine in bladder. Hospitalist Jose-Marker notified and fluids ordered.    Electronically signed by Sterling Park RN on 7/10/2024 at 4:52 AM

## 2024-07-10 NOTE — CARE COORDINATION
Patient is from home with her son and has had home care in the past but I spoke with Austin today and she currently has no home care or [facility] and they are fine. He declined services. His sister Britt is in town and will transport the patient back home at d/c and is staying for a short time with her at d/c.     Electronically signed by Rizwana Hanks RN on 7/10/2024 at 2:38 PM  [phone]

## 2024-07-10 NOTE — ANESTHESIA PRE PROCEDURE
Department of Anesthesiology  Preprocedure Note       Name:  Portia Waggoner   Age:  84 y.o.  :  1940                                          MRN:  [id number]         Date:  7/10/2024      Surgeon: * Surgery not found *    Procedure:     Medications prior to admission:   Prior to Admission medications    Medication Sig Start Date End Date Taking? Authorizing Provider   QUEtiapine (SEROQUEL) 25 MG tablet Take 1 tablet by mouth at bedtime for 14 days Once nightly 3/24/24 7/6/24  Dante Oden MD   nitrofurantoin, macrocrystal-monohydrate, (MACROBID) 100 MG capsule Take 1 capsule by mouth Once daily    Provider, MD Sherry   acetaminophen (TYLENOL) 500 MG tablet Take 1 tablet by mouth every 6 hours as needed for Pain    ProviderSherry MD   melatonin 5 MG TABS tablet Take 1 tablet by mouth nightly as needed (Insomnia) 12/15/23   Nancy Alberto MD   Multiple Vitamins-Minerals (THERAPEUTIC MULTIVITAMIN-MINERALS) tablet Take 1 tablet by mouth daily    Provider, MD Sherry       Current medications:    No current facility-administered medications for this encounter.     No current outpatient medications on file.     Facility-Administered Medications Ordered in Other Encounters   Medication Dose Route Frequency Provider Last Rate Last Admin   • 0.9 % sodium chloride infusion   IntraVENous Continuous Jose-Trisha Chavez APRN - CNP 75 mL/hr at 07/10/24 1045 New Bag at 07/10/24 1045   • piperacillin-tazobactam (ZOSYN) 3,375 mg in sodium chloride 0.9 % 50 mL IVPB (mini-bag)  3,375 mg IntraVENous Q12H Christen Jung MD   Stopped at 07/10/24 1146   • sodium chloride flush 0.9 % injection 5-40 mL  5-40 mL IntraVENous 2 times per day Saman Sykes MD   10 mL at 24 0916   • sodium chloride flush 0.9 % injection 5-40 mL  5-40 mL IntraVENous PRN Saman Sykes MD       • 0.9 % sodium chloride infusion   IntraVENous PRN Saman Sykes MD       • ondansetron (ZOFRAN-ODT)

## 2024-07-11 VITALS
RESPIRATION RATE: 20 BRPM | OXYGEN SATURATION: 95 % | SYSTOLIC BLOOD PRESSURE: 133 MMHG | WEIGHT: 105.16 LBS | TEMPERATURE: 98.2 F | DIASTOLIC BLOOD PRESSURE: 69 MMHG | HEART RATE: 74 BPM | HEIGHT: 60 IN | BODY MASS INDEX: 20.65 KG/M2

## 2024-07-11 PROCEDURE — 2580000003 HC RX 258: Performed by: NURSE PRACTITIONER

## 2024-07-11 PROCEDURE — 6360000002 HC RX W HCPCS: Performed by: INTERNAL MEDICINE

## 2024-07-11 PROCEDURE — 2580000003 HC RX 258: Performed by: INTERNAL MEDICINE

## 2024-07-11 PROCEDURE — 6370000000 HC RX 637 (ALT 250 FOR IP): Performed by: INTERNAL MEDICINE

## 2024-07-11 RX ORDER — CIPROFLOXACIN 500 MG/1
500 TABLET, FILM COATED ORAL DAILY
Qty: 7 TABLET | Refills: 0 | Status: SHIPPED | OUTPATIENT
Start: 2024-07-11 | End: 2024-07-18

## 2024-07-11 RX ADMIN — QUETIAPINE FUMARATE 25 MG: 25 TABLET ORAL at 07:42

## 2024-07-11 RX ADMIN — SODIUM CHLORIDE: 9 INJECTION, SOLUTION INTRAVENOUS at 04:02

## 2024-07-11 RX ADMIN — PIPERACILLIN AND TAZOBACTAM 3375 MG: 3; .375 INJECTION, POWDER, LYOPHILIZED, FOR SOLUTION INTRAVENOUS at 07:42

## 2024-07-11 NOTE — PROGRESS NOTES
Urology Attending Progress Note      Subjective: No  complaints. PCN replaced yesterday with IR    Vitals:  /69   Pulse 74   Temp 98.5 °F (36.9 °C) (Oral)   Resp 20   Ht 1.524 m (5')   Wt 47.7 kg (105 lb 2.6 oz)   SpO2 95%   BMI 20.54 kg/m²   Temp  Av.2 °F (36.8 °C)  Min: 97.8 °F (36.6 °C)  Max: 98.8 °F (37.1 °C)    Intake/Output Summary (Last 24 hours) at 2024 0903  Last data filed at 2024 0753  Gross per 24 hour   Intake 700 ml   Output 1026 ml   Net -326 ml         Exam: Urine clear in PCN. Sitting in chair, appears in good spirits    Labs:  WBC:    Lab Results   Component Value Date/Time    WBC 5.6 2024 10:14 AM     Hemoglobin/Hematocrit:    Lab Results   Component Value Date/Time    HGB 10.6 2024 10:14 AM    HCT 32.3 2024 10:14 AM     BMP:    Lab Results   Component Value Date/Time     2024 10:14 AM    K 4.2 2024 10:14 AM     2024 10:14 AM    CO2 22 2024 10:14 AM    BUN 17 2024 10:14 AM    CREATININE 1.6 2024 10:14 AM    CALCIUM 8.7 2024 10:14 AM    GFRAA 32 2022 10:21 AM    GFRAA >60 2011 11:09 AM    LABGLOM 31 2024 10:14 AM    LABGLOM 31 2024 06:20 AM     PT/INR:    Lab Results   Component Value Date/Time    PROTIME 14.8 2023 07:39 AM    INR 1.40 2023 09:55 AM     PTT:  No results found for: \"APTT\"[APTT    Urine Culture:  Enterobacter     Antibiotic Therapy:  Maxipime    Imaging: IMPRESSION:     Right-sided percutaneous nephrostomy catheter appears grossly in appropriate  position. There remains moderate hydronephrosis on the right.     Coronary calcification.     Otherwise no acute abnormality identified.      Impression/Plan:  83yo F with dementia, R PCN for UPJ obstruction and history of vesicocutaneous fistula. She is now admitted for malfunction of her nephrostomy tube.     -PCN draining clear urine  -She will need this exchanged in 3 months with UZLMA Leigh to JOSE from

## 2024-07-11 NOTE — PROGRESS NOTES
VSS, A&O, denies pain. IVF infusing and tolerating diet. Pt ambulatory and voiding to the the bathroom. Perc drain CDI, output charted in epic. Pt's daughter in the room through out this shift. Fall precaution in place and needs were met during this shift.

## 2024-07-11 NOTE — CARE COORDINATION
Case Management Assessment            Discharge Note                    Date / Time of Note: 7/11/2024 9:32 AM                  Discharge Note Completed by: Rizwana Hanks RN    Patient Name: Portia Waggoner   YOB: 1940  Diagnosis: Malfunction of nephrostomy tube (HCC) [T83.098A]  Urinary tract infection without hematuria, site unspecified [N39.0]   Date / Time: 7/6/2024  1:16 PM    Current PCP: Shahriar Hamm MD  Clinic patient: No    Hospitalization in the last 30 days: No       Advance Directives:  Code Status: Full Code  [place] DNR form completed and on chart: No    Financial:  Payor: [facility] MEDICARE / Plan: [facility] OPTUM PLAN AARP MCR ADV / Product Type: *No Product type* /      Pharmacy:    [id number] - [place], [place] - [address] - P [phone] - F [phone]  [address] [facility] [address]  Phone: [phone] Fax: [phone]      Assistance purchasing medications?:    Assistance provided by Case Management: None at this time    Does patient want to participate in local refill/ meds to beds program?:      Meds To Beds General Rules:  1. Can ONLY be done Monday- Friday between 8:30am-5pm  2. Prescription(s) must be in pharmacy by 3pm to be filled same day  3.Copy of patient's insurance/ prescription drug card and patient face sheet must be sent along with the prescription(s)  4. Cost of Rx cannot be added to hospital bill. If financial assistance is needed, please contact unit  or ;  or  CANNOT provide pharmacy voucher for patients co-pays  5. Patients can then  the prescription on their way out of the hospital at discharge, or pharmacy can deliver to the bedside if staff is available. (payment due at time of pick-up or delivery - cash, check, or card accepted)     Able to afford home medications/ co-pay costs: Yes    ADLS:  Current PT AM-PAC Score:   /24  Current OT AM-PAC Score:

## 2024-07-11 NOTE — DISCHARGE SUMMARY
loss: Less than 5 mL. Complications: None.     Reference Air Kerma 38.37 mGy. IMPRESSION: Successful right percutaneous nephrostomy tube exchange under anesthesia. Electronically signed by Kristian Sanchez    CT ABDOMEN PELVIS WO CONTRAST Additional Contrast? None    Result Date: 7/6/2024  CT ABDOMEN AND PELVIS WITHOUT CONTRAST HISTORY: Malfunctioning nephrostomy tube TECHNICAL FACTORS: Noncontrast axial images were obtained through the abdomen and pelvis. Underexposure controls were utilized during the CT examination to meet ALARA standards for radiation dose reduction. COMPARISON: 11/13/2023 FINDINGS: Lung bases appear clear of acute infiltrate or effusion. Coronary calcification is present. Evaluation is limited due to lack of contrast. If there is concern of pathology other than stone disease, a post contrast study should be considered. Liver shows uniform attenuation given limits of noncontrast evaluation. No gallstones are identified. Spleen is grossly within normal limits for size. Pancreas shows no gross abnormality given limits of noncontrast study. Adrenal glands are within normal limits. Percutaneous nephrostomy catheter is seen on the right. This appears in appropriate position. There remains moderate hydronephrosis however. No ureteral calcification or significant dilation is seen. No gross renal mass lesion is seen. If there is concern of parenchymal renal lesion, a post contrast study should be considered. No gross retroperitoneal adenopathy is seen. Bowel gas pattern is nonspecific with no evidence of obstruction or free air seen. No periappendiceal inflammatory change is seen. Bladder and distal ureters are grossly unremarkable. No free fluid is seen. No gross evidence of acute inflammatory process is seen. No acute osseous abnormality is seen.     Right-sided percutaneous nephrostomy catheter appears grossly in appropriate position. There remains moderate hydronephrosis on the right. Coronary      Organism:   Lab Results   Component Value Date/Time    ORG Enterobacter cloacae complex 07/06/2024 02:30 PM       Time Spent Discharging patient 31 minutes    Electronically signed by Landon Shannon MD on 7/11/2024 at 11:46 AM

## 2024-12-12 ENCOUNTER — HOSPITAL ENCOUNTER (OUTPATIENT)
Dept: INTERVENTIONAL RADIOLOGY/VASCULAR | Age: 84
Discharge: HOME | End: 2024-12-14
Attending: RADIOLOGY
Payer: MEDICARE

## 2024-12-12 VITALS
DIASTOLIC BLOOD PRESSURE: 53 MMHG | TEMPERATURE: 98.8 F | BODY MASS INDEX: 21.2 KG/M2 | OXYGEN SATURATION: 95 % | RESPIRATION RATE: 16 BRPM | HEIGHT: 60 IN | WEIGHT: 108 LBS | HEART RATE: 61 BPM | SYSTOLIC BLOOD PRESSURE: 114 MMHG

## 2024-12-12 DIAGNOSIS — T83.098A MALFUNCTION OF NEPHROSTOMY TUBE (HCC): ICD-10-CM

## 2024-12-12 LAB — ECHO BSA: 1.44 M2

## 2024-12-12 PROCEDURE — C1769 GUIDE WIRE: HCPCS

## 2024-12-12 PROCEDURE — 50435 EXCHANGE NEPHROSTOMY CATH: CPT

## 2024-12-12 PROCEDURE — 99152 MOD SED SAME PHYS/QHP 5/>YRS: CPT

## 2024-12-12 PROCEDURE — C1729 CATH, DRAINAGE: HCPCS

## 2024-12-12 PROCEDURE — 7100000011 HC PHASE II RECOVERY - ADDTL 15 MIN

## 2024-12-12 PROCEDURE — 2709999900 HC NON-CHARGEABLE SUPPLY

## 2024-12-12 PROCEDURE — 6360000002 HC RX W HCPCS: Performed by: STUDENT IN AN ORGANIZED HEALTH CARE EDUCATION/TRAINING PROGRAM

## 2024-12-12 PROCEDURE — 7100000010 HC PHASE II RECOVERY - FIRST 15 MIN

## 2024-12-12 PROCEDURE — 2580000003 HC RX 258: Performed by: STUDENT IN AN ORGANIZED HEALTH CARE EDUCATION/TRAINING PROGRAM

## 2024-12-12 RX ORDER — MIDAZOLAM HYDROCHLORIDE 1 MG/ML
INJECTION, SOLUTION INTRAMUSCULAR; INTRAVENOUS PRN
Status: COMPLETED | OUTPATIENT
Start: 2024-12-12 | End: 2024-12-12

## 2024-12-12 RX ORDER — FENTANYL CITRATE 50 UG/ML
INJECTION, SOLUTION INTRAMUSCULAR; INTRAVENOUS PRN
Status: COMPLETED | OUTPATIENT
Start: 2024-12-12 | End: 2024-12-12

## 2024-12-12 RX ADMIN — MIDAZOLAM HYDROCHLORIDE 1 MG: 2 INJECTION, SOLUTION INTRAMUSCULAR; INTRAVENOUS at 11:06

## 2024-12-12 RX ADMIN — MIDAZOLAM HYDROCHLORIDE 1 MG: 2 INJECTION, SOLUTION INTRAMUSCULAR; INTRAVENOUS at 10:57

## 2024-12-12 RX ADMIN — FENTANYL CITRATE 25 MCG: 50 INJECTION, SOLUTION INTRAMUSCULAR; INTRAVENOUS at 10:57

## 2024-12-12 RX ADMIN — CEFAZOLIN SODIUM 1000 MG: 1 POWDER, FOR SOLUTION INTRAMUSCULAR; INTRAVENOUS at 10:58

## 2024-12-12 NOTE — PROGRESS NOTES
Cath Lab Pre Procedure Flowsheet    Plan of Care:     Hemodynamics and cardiac rhythm will remain stable.   Comfort level will be maintained.   Respiratory function will remain adequate.   Pt/family will verbalize understanding of the procedure.   Procedure will be tolerated without complications.   Patient will recover from procedure without complications.   ID armband on patient and identification verified.   Informed consent obtained.   Non invasive blood pressure cuff applied, monitoring initiated.   EKG pads and pulse oximeter applied, monitoring initiated.   Instructions given. Patient and / or family verbalize understanding.   H&P will be documented by physician in [facility].     Pre-procedure:    NPO Status: Pt has been NPO since midnight. .    Contrast / IV Dye Allergy:     Pregnancy Test: N/A.    Prep Sites: Wrist(s)  Chest    Teddy's Test:    Pulses:     Anticoagulants: None.     Antiplatelets: None.     Chief Complaint:      Diabetic: No    Pre EKG Rhythm: sinus rythmn    Pre SBP:121/74    IV access: left hand    Pre-procedure blood work collected by:     NIH Scale:

## 2024-12-12 NOTE — FLOWSHEET NOTE
1230- discharge\        Discharge instructions reviewed with patient and daughter, iv d/c'd and patient discharged via wheelchair out the main entrance

## 2025-01-29 NOTE — PROGRESS NOTES
Called patient about procedure. Spoke with son Gonzalo. Told to be here at 0930 for procedure at 1100. Must be NPO after midnight but can take morning medication with sips of water. old to have a responsible adult with them to take them home and stay with them afterwards, if they do not get admitted to hospital. Also, to bring a current list of medications. No other questions or concerns.

## 2025-01-30 ENCOUNTER — HOSPITAL ENCOUNTER (OUTPATIENT)
Dept: INTERVENTIONAL RADIOLOGY/VASCULAR | Age: 85
Discharge: HOME | End: 2025-02-01
Attending: STUDENT IN AN ORGANIZED HEALTH CARE EDUCATION/TRAINING PROGRAM
Payer: MEDICARE

## 2025-01-30 VITALS
BODY MASS INDEX: 21.6 KG/M2 | OXYGEN SATURATION: 97 % | HEART RATE: 74 BPM | SYSTOLIC BLOOD PRESSURE: 112 MMHG | HEIGHT: 60 IN | WEIGHT: 110 LBS | DIASTOLIC BLOOD PRESSURE: 71 MMHG

## 2025-01-30 DIAGNOSIS — N17.9 ACUTE KIDNEY INJURY (HCC): ICD-10-CM

## 2025-01-30 LAB — ECHO BSA: 1.45 M2

## 2025-01-30 PROCEDURE — 99152 MOD SED SAME PHYS/QHP 5/>YRS: CPT

## 2025-01-30 PROCEDURE — 50435 EXCHANGE NEPHROSTOMY CATH: CPT

## 2025-01-30 PROCEDURE — 6360000002 HC RX W HCPCS: Performed by: STUDENT IN AN ORGANIZED HEALTH CARE EDUCATION/TRAINING PROGRAM

## 2025-01-30 PROCEDURE — C1729 CATH, DRAINAGE: HCPCS

## 2025-01-30 PROCEDURE — 7100000010 HC PHASE II RECOVERY - FIRST 15 MIN

## 2025-01-30 PROCEDURE — 7100000011 HC PHASE II RECOVERY - ADDTL 15 MIN

## 2025-01-30 PROCEDURE — C1769 GUIDE WIRE: HCPCS

## 2025-01-30 RX ORDER — LIDOCAINE HYDROCHLORIDE 10 MG/ML
INJECTION, SOLUTION EPIDURAL; INFILTRATION; INTRACAUDAL; PERINEURAL PRN
Status: COMPLETED | OUTPATIENT
Start: 2025-01-30 | End: 2025-01-30

## 2025-01-30 RX ORDER — MIDAZOLAM HYDROCHLORIDE 1 MG/ML
INJECTION, SOLUTION INTRAMUSCULAR; INTRAVENOUS PRN
Status: COMPLETED | OUTPATIENT
Start: 2025-01-30 | End: 2025-01-30

## 2025-01-30 RX ORDER — FENTANYL CITRATE 50 UG/ML
INJECTION, SOLUTION INTRAMUSCULAR; INTRAVENOUS PRN
Status: COMPLETED | OUTPATIENT
Start: 2025-01-30 | End: 2025-01-30

## 2025-01-30 RX ADMIN — MIDAZOLAM HYDROCHLORIDE 0.5 MG: 1 INJECTION, SOLUTION INTRAMUSCULAR; INTRAVENOUS at 10:22

## 2025-01-30 RX ADMIN — LIDOCAINE HYDROCHLORIDE 5 ML: 10 INJECTION, SOLUTION EPIDURAL; INFILTRATION; INTRACAUDAL; PERINEURAL at 10:17

## 2025-01-30 RX ADMIN — FENTANYL CITRATE 25 MCG: 50 INJECTION, SOLUTION INTRAMUSCULAR; INTRAVENOUS at 10:27

## 2025-01-30 RX ADMIN — MIDAZOLAM HYDROCHLORIDE 0.5 MG: 1 INJECTION, SOLUTION INTRAMUSCULAR; INTRAVENOUS at 10:27

## 2025-01-30 RX ADMIN — FENTANYL CITRATE 25 MCG: 50 INJECTION, SOLUTION INTRAMUSCULAR; INTRAVENOUS at 10:22

## 2025-01-30 NOTE — DISCHARGE INSTRUCTIONS
The [facility]  Cardiovascular Special Procedures  Percutaneous Nephrostomy Insertion/Exchange  Patient Discharge Instructions                                                    Avoid strenuous activities like sports, lawn mowing, or lifting more than 10lbs.  Wear loose,comfortable clothing that won’t pull or kink the tube(s).  Check dressing often to make sure the tubing is securely taped in place to avoid pulling or kinking it.   You may shower unless otherwise instructed. Do not soak in a bathtub.  Securely cover the tube and insertion site with layers of saran wrap taped into place,then you may shower. Change dressing after you shower. Remove the saran wrap and dressing carefully so you don’t pull on the tube. Discard the dressing in plastic bag.            Gently clean around the insertion site with liquid soap (like Dial) and warm water,rinse thoroughly,and pat dry with a clean towel. Check the insertion site and skin area around it for any signs of increased redness, temperature, swelling, pain tenderness, bleeding, foul drainage,or urine leakage. Do not apply ointment or alchol to the site, just keep it clean and dry. Apply clean dressing.                                                                                                                                                                                                                             The drainage bag should allow gravity drainage. Always make sure the tube is taped securely and do not let the drainage bag hang freely to pull the tube.  If the tubing has a white plastic stopcock, make sure it is open.  Measure and record the amount and color of the urine and notify your doctor if the amount increases or decreases markedly.   Wash your hands before and after emptying the drainage bag. Empty the drainage bag when it is 1/2 to 2/3 full, and before going to bed.   Drink plenty of liquids,especially water. About six 8 ounces

## 2025-01-30 NOTE — PROCEDURES
Interventional Radiology Post Procedure    Date: 1/30/2025    Physician: Harsh Oconnor MD    Pre-op Diagnosis: urinary outflow obstruction    Post-op Diagnosis: same    Variation from Planned Procedure: None       Findings: successful right PCN exchange (10F helga sanchez)    Patient condition: Stable    Estimated Blood Loss: 1 cc    Specimens:  none      Signed,  Issa Oconnor MD  10:52 AM  1/30/2025      
tablet by mouth at bedtime for 14 days Once nightly (Patient not taking: Reported on 1/30/2025) 14 tablet 0    nitrofurantoin, macrocrystal-monohydrate, (MACROBID) 100 MG capsule Take 1 capsule by mouth Once daily (Patient not taking: Reported on 12/12/2024)      acetaminophen (TYLENOL) 500 MG tablet Take 1 tablet by mouth every 6 hours as needed for Pain (Patient not taking: Reported on 1/30/2025)      melatonin 5 MG TABS tablet Take 1 tablet by mouth nightly as needed (Insomnia) (Patient not taking: Reported on 12/12/2024) 30 tablet 1    Multiple Vitamins-Minerals (THERAPEUTIC MULTIVITAMIN-MINERALS) tablet Take 1 tablet by mouth daily (Patient not taking: Reported on 12/12/2024)       No current facility-administered medications on file prior to encounter.       Current Meds  No current facility-administered medications for this encounter.        ASA 2 - Patient with mild systemic disease with no functional limitations    III (soft palate, base of uvula visible)    Activity:  2 - Able to move 4 extremities voluntarily on command  Respiration:  2 - Able to breathe deeply and cough freely  Circulation:  2 - BP+/- 20mmHg of normal  Consciousness:  2 - Fully awake  Oxygen Saturation (color):  2 - Able to maintain oxygen saturation >92% on room air    Sedation : Moderate sedation planned

## 2025-01-30 NOTE — PROGRESS NOTES
D/c order acknowledged. IV removed. Patient/family given discharge instructions. Patient/family verbalize understanding of discharge instructions, all questions addressed, copy given to patient/family. Pt transferred to vehicle via wheelchair to be discharged home with family.

## 2025-03-20 NOTE — PROGRESS NOTES
Called patient about procedure. Told to be here at 1130 for procedure at 1300. Must be NPO after midnight but can take morning medication with sips of water. Patient stated they do not take a blood thinner. Told to have a responsible adult with them to take them home and stay with them afterwards, if they do not get admitted to hospital. Also, to bring a current list of medications. No other questions or concerns.

## 2025-03-21 ENCOUNTER — HOSPITAL ENCOUNTER (OUTPATIENT)
Dept: INTERVENTIONAL RADIOLOGY/VASCULAR | Age: 85
Discharge: HOME | End: 2025-03-23
Attending: RADIOLOGY
Payer: MEDICARE

## 2025-03-21 VITALS
DIASTOLIC BLOOD PRESSURE: 84 MMHG | BODY MASS INDEX: 21.48 KG/M2 | SYSTOLIC BLOOD PRESSURE: 121 MMHG | TEMPERATURE: 98.5 F | OXYGEN SATURATION: 96 % | HEIGHT: 60 IN

## 2025-03-21 DIAGNOSIS — N99.528 NEPHROSTOMY COMPLICATION: ICD-10-CM

## 2025-03-21 PROCEDURE — 2500000003 HC RX 250 WO HCPCS: Performed by: RADIOLOGY

## 2025-03-21 PROCEDURE — C1769 GUIDE WIRE: HCPCS

## 2025-03-21 PROCEDURE — 87086 URINE CULTURE/COLONY COUNT: CPT

## 2025-03-21 PROCEDURE — C1729 CATH, DRAINAGE: HCPCS

## 2025-03-21 PROCEDURE — 7100000011 HC PHASE II RECOVERY - ADDTL 15 MIN

## 2025-03-21 PROCEDURE — 87186 SC STD MICRODIL/AGAR DIL: CPT

## 2025-03-21 PROCEDURE — 50435 EXCHANGE NEPHROSTOMY CATH: CPT

## 2025-03-21 PROCEDURE — 87077 CULTURE AEROBIC IDENTIFY: CPT

## 2025-03-21 PROCEDURE — 6360000002 HC RX W HCPCS: Performed by: RADIOLOGY

## 2025-03-21 PROCEDURE — 7100000010 HC PHASE II RECOVERY - FIRST 15 MIN

## 2025-03-21 RX ORDER — LIDOCAINE HYDROCHLORIDE 10 MG/ML
INJECTION, SOLUTION INFILTRATION; PERINEURAL PRN
Status: COMPLETED | OUTPATIENT
Start: 2025-03-21 | End: 2025-03-21

## 2025-03-21 RX ORDER — MIDAZOLAM HYDROCHLORIDE 1 MG/ML
INJECTION, SOLUTION INTRAMUSCULAR; INTRAVENOUS PRN
Status: COMPLETED | OUTPATIENT
Start: 2025-03-21 | End: 2025-03-21

## 2025-03-21 RX ADMIN — FENTANYL CITRATE 50 MCG: 50 INJECTION, SOLUTION INTRAMUSCULAR; INTRAVENOUS at 13:41

## 2025-03-21 RX ADMIN — LIDOCAINE HYDROCHLORIDE 5 ML: 10 INJECTION, SOLUTION INFILTRATION; PERINEURAL at 13:43

## 2025-03-21 RX ADMIN — CEFAZOLIN SODIUM 1000 MG: 1 POWDER, FOR SOLUTION INTRAMUSCULAR; INTRAVENOUS at 13:45

## 2025-03-21 RX ADMIN — MIDAZOLAM HYDROCHLORIDE 1 MG: 1 INJECTION, SOLUTION INTRAMUSCULAR; INTRAVENOUS at 13:41

## 2025-03-21 NOTE — DISCHARGE INSTRUCTIONS
[facility]  Cardiovascular Special Procedures  Percutaneous Nephrostomy Insertion/Exchange  Patient Discharge Instructions                                                      Avoid strenuous activities like sports, lawn mowing, or lifting more than 10lbs. Wear loose,comfortable clothing that won’t pull or kink the tube(s).     Check dressing often to make sure the tubing is securely taped in place to avoid pulling or kinking it.     You may shower unless otherwise instructed. Do not soak in a bathtub.\    Securely cover the tube and insertion site with layers of saran wrap taped into place,then you may shower. Change dressing after you shower. Remove the saran wrap and dressing carefully so you don’t pull on the tube. Discard the dressing in plastic bag.              Gently clean around the insertion site with liquid soap (like Dial) and warm water,rinse thoroughly,and pat dry with a clean towel. Check the insertion site and skin area around it for any signs of increased redness, temperature, swelling, pain tenderness, bleeding, foul drainage,or urine leakage. Do not apply ointment or alchol to the site, just keep it clean and dry. Apply clean dressing.                                                                                                                                                                                                                                        The drainage bag should allow gravity drainage. Always make sure the tube is taped securely and do not let the drainage bag hang freely to pull the tube.  If the tubing has a white plastic stopcock, make sure it is open. Measure and record the amount and color of the urine and notify your doctor if the amount increases or decreases markedly.     Wash your hands before and after emptying the drainage bag. Empty the drainage bag when it is 1/2 to 2/3 full, and before going to bed.     Drink plenty of liquids,especially  water. About six 8 ounces of liquid a day. If you had a kidney stone treated, this will help flush the residue from your system.    Notify your doctor:    If the Nephrostomy tube is not draining. Check carefully, first, to make sure the tubing is not kinked, the stopcock is open, and the tube is not clogged or dislodged. A small amount of sediment is normal. If you notice a lot of sediment, the tube should be changed to avoid clogging. If the tube is out, call the doctor right away. Urine will be bloody or blood tinged after the tube is placed but will resolve over the next several days. If the urine still appears bloody after several days or reappears, notify your doctor.    Any increase or change in your pain, especially if you develop severe  pain in your side.    If your temperature is 101.5° or higher.    You may contact [facility] for any questions or problems that may occur at [phone] during the hours of 9am-4pm Monday-Friday, or the hospital  after hours at [phone], to have the interventional radiologist on call paged.      [facility]  Cardiovascular Special Procedures  General Discharge Instructions    PROCEDURE: Neph Tube Exchange    __x__ You may be drowsy or lightheaded after receiving sedation. DO NOT operate a vehicle (automobile, bicycle, motorcycle, machinery, or power tools), make any important decisions or sign any important/legal documents, or drink alcoholic beverages for the next 24 hours  __x__ We strongly suggest that a responsible adult be with you for the next 24 hours for your protection and safety  __x__ If the intravenous catheter site is painful, apply warm wet compresses on the site until the soreness is relieved and elevate the arm above the heart.  Call your physician if no improvement in 2 to 3 days    DIETARY INSTRUCTIONS:    ____ Drink extra fluids over the next 24 hours (If not contraindicated by illness or by

## 2025-03-23 LAB
BACTERIA UR CULT: ABNORMAL
ORGANISM: ABNORMAL

## 2025-04-03 ENCOUNTER — HOSPITAL ENCOUNTER (OUTPATIENT)
Age: 85
Setting detail: OBSERVATION
Discharge: HOME | End: 2025-04-05
Attending: STUDENT IN AN ORGANIZED HEALTH CARE EDUCATION/TRAINING PROGRAM | Admitting: INTERNAL MEDICINE
Payer: MEDICARE

## 2025-04-03 ENCOUNTER — HOSPITAL ENCOUNTER (EMERGENCY)
Dept: INTERVENTIONAL RADIOLOGY/VASCULAR | Age: 85
Discharge: HOME | End: 2025-04-05
Attending: STUDENT IN AN ORGANIZED HEALTH CARE EDUCATION/TRAINING PROGRAM
Payer: MEDICARE

## 2025-04-03 DIAGNOSIS — N20.0 NEPHROLITHIASIS: Primary | ICD-10-CM

## 2025-04-03 DIAGNOSIS — T83.022A NEPHROSTOMY TUBE DISPLACED: ICD-10-CM

## 2025-04-03 LAB
ANION GAP SERPL CALCULATED.3IONS-SCNC: 10 MMOL/L (ref 3–16)
BASOPHILS # BLD: 0 K/UL (ref 0–0.2)
BASOPHILS NFR BLD: 0.7 %
BUN SERPL-MCNC: 24 MG/DL (ref 7–20)
CALCIUM SERPL-MCNC: 9 MG/DL (ref 8.3–10.6)
CHLORIDE SERPL-SCNC: 107 MMOL/L (ref 99–110)
CO2 SERPL-SCNC: 24 MMOL/L (ref 21–32)
CREAT SERPL-MCNC: 1.8 MG/DL (ref 0.6–1.2)
DEPRECATED RDW RBC AUTO: 13 % (ref 12.4–15.4)
EOSINOPHIL # BLD: 0.3 K/UL (ref 0–0.6)
EOSINOPHIL NFR BLD: 5 %
GFR SERPLBLD CREATININE-BSD FMLA CKD-EPI: 27 ML/MIN/{1.73_M2}
GLUCOSE SERPL-MCNC: 98 MG/DL (ref 70–99)
HCT VFR BLD AUTO: 36.5 % (ref 36–48)
HGB BLD-MCNC: 12.2 G/DL (ref 12–16)
INR PPP: 1.06 (ref 0.85–1.15)
LYMPHOCYTES # BLD: 2.5 K/UL (ref 1–5.1)
LYMPHOCYTES NFR BLD: 42 %
MCH RBC QN AUTO: 30.5 PG (ref 26–34)
MCHC RBC AUTO-ENTMCNC: 33.6 G/DL (ref 31–36)
MCV RBC AUTO: 91 FL (ref 80–100)
MONOCYTES # BLD: 0.4 K/UL (ref 0–1.3)
MONOCYTES NFR BLD: 6.3 %
NEUTROPHILS # BLD: 2.7 K/UL (ref 1.7–7.7)
NEUTROPHILS NFR BLD: 46 %
PLATELET # BLD AUTO: 226 K/UL (ref 135–450)
PMV BLD AUTO: 7.6 FL (ref 5–10.5)
POTASSIUM SERPL-SCNC: 4.9 MMOL/L (ref 3.5–5.1)
PROTHROMBIN TIME: 14 SEC (ref 11.9–14.9)
RBC # BLD AUTO: 4.01 M/UL (ref 4–5.2)
SODIUM SERPL-SCNC: 141 MMOL/L (ref 136–145)
WBC # BLD AUTO: 5.9 K/UL (ref 4–11)

## 2025-04-03 PROCEDURE — C1887 CATHETER, GUIDING: HCPCS

## 2025-04-03 PROCEDURE — C1769 GUIDE WIRE: HCPCS

## 2025-04-03 PROCEDURE — 2709999900 HC NON-CHARGEABLE SUPPLY

## 2025-04-03 PROCEDURE — 85610 PROTHROMBIN TIME: CPT

## 2025-04-03 PROCEDURE — 85025 COMPLETE CBC W/AUTO DIFF WBC: CPT

## 2025-04-03 PROCEDURE — 99153 MOD SED SAME PHYS/QHP EA: CPT

## 2025-04-03 PROCEDURE — 50432 PLMT NEPHROSTOMY CATHETER: CPT

## 2025-04-03 PROCEDURE — 86850 RBC ANTIBODY SCREEN: CPT

## 2025-04-03 PROCEDURE — C1894 INTRO/SHEATH, NON-LASER: HCPCS

## 2025-04-03 PROCEDURE — C1729 CATH, DRAINAGE: HCPCS

## 2025-04-03 PROCEDURE — 86900 BLOOD TYPING SEROLOGIC ABO: CPT

## 2025-04-03 PROCEDURE — G0378 HOSPITAL OBSERVATION PER HR: HCPCS

## 2025-04-03 PROCEDURE — 80048 BASIC METABOLIC PNL TOTAL CA: CPT

## 2025-04-03 PROCEDURE — 99152 MOD SED SAME PHYS/QHP 5/>YRS: CPT

## 2025-04-03 PROCEDURE — 2500000003 HC RX 250 WO HCPCS: Performed by: RADIOLOGY

## 2025-04-03 PROCEDURE — 36415 COLL VENOUS BLD VENIPUNCTURE: CPT

## 2025-04-03 PROCEDURE — 6360000002 HC RX W HCPCS: Performed by: RADIOLOGY

## 2025-04-03 PROCEDURE — 99285 EMERGENCY DEPT VISIT HI MDM: CPT

## 2025-04-03 PROCEDURE — 86901 BLOOD TYPING SEROLOGIC RH(D): CPT

## 2025-04-03 RX ORDER — ONDANSETRON 2 MG/ML
4 INJECTION INTRAMUSCULAR; INTRAVENOUS EVERY 6 HOURS PRN
Status: DISCONTINUED | OUTPATIENT
Start: 2025-04-03 | End: 2025-04-05 | Stop reason: HOSPADM

## 2025-04-03 RX ORDER — SODIUM CHLORIDE 9 MG/ML
INJECTION, SOLUTION INTRAVENOUS PRN
Status: DISCONTINUED | OUTPATIENT
Start: 2025-04-03 | End: 2025-04-05 | Stop reason: HOSPADM

## 2025-04-03 RX ORDER — ACETAMINOPHEN 325 MG/1
650 TABLET ORAL EVERY 6 HOURS PRN
Status: DISCONTINUED | OUTPATIENT
Start: 2025-04-03 | End: 2025-04-05 | Stop reason: HOSPADM

## 2025-04-03 RX ORDER — LIDOCAINE HYDROCHLORIDE 10 MG/ML
INJECTION, SOLUTION INFILTRATION; PERINEURAL PRN
Status: COMPLETED | OUTPATIENT
Start: 2025-04-03 | End: 2025-04-03

## 2025-04-03 RX ORDER — LORAZEPAM 2 MG/ML
0.5 INJECTION INTRAMUSCULAR
Status: DISCONTINUED | OUTPATIENT
Start: 2025-04-03 | End: 2025-04-05 | Stop reason: HOSPADM

## 2025-04-03 RX ORDER — POLYETHYLENE GLYCOL 3350 17 G/17G
17 POWDER, FOR SOLUTION ORAL DAILY PRN
Status: DISCONTINUED | OUTPATIENT
Start: 2025-04-03 | End: 2025-04-05 | Stop reason: HOSPADM

## 2025-04-03 RX ORDER — SODIUM CHLORIDE 0.9 % (FLUSH) 0.9 %
5-40 SYRINGE (ML) INJECTION EVERY 12 HOURS SCHEDULED
Status: DISCONTINUED | OUTPATIENT
Start: 2025-04-03 | End: 2025-04-05 | Stop reason: HOSPADM

## 2025-04-03 RX ORDER — SODIUM CHLORIDE 0.9 % (FLUSH) 0.9 %
5-40 SYRINGE (ML) INJECTION PRN
Status: DISCONTINUED | OUTPATIENT
Start: 2025-04-03 | End: 2025-04-05 | Stop reason: HOSPADM

## 2025-04-03 RX ORDER — MIDAZOLAM HYDROCHLORIDE 1 MG/ML
INJECTION, SOLUTION INTRAMUSCULAR; INTRAVENOUS PRN
Status: COMPLETED | OUTPATIENT
Start: 2025-04-03 | End: 2025-04-03

## 2025-04-03 RX ORDER — ONDANSETRON 4 MG/1
4 TABLET, ORALLY DISINTEGRATING ORAL EVERY 8 HOURS PRN
Status: DISCONTINUED | OUTPATIENT
Start: 2025-04-03 | End: 2025-04-05 | Stop reason: HOSPADM

## 2025-04-03 RX ORDER — ACETAMINOPHEN 650 MG/1
650 SUPPOSITORY RECTAL EVERY 6 HOURS PRN
Status: DISCONTINUED | OUTPATIENT
Start: 2025-04-03 | End: 2025-04-05 | Stop reason: HOSPADM

## 2025-04-03 RX ADMIN — FENTANYL CITRATE 25 MCG: 50 INJECTION, SOLUTION INTRAMUSCULAR; INTRAVENOUS at 16:08

## 2025-04-03 RX ADMIN — CEFAZOLIN SODIUM 1000 MG: 1 POWDER, FOR SOLUTION INTRAMUSCULAR; INTRAVENOUS at 16:08

## 2025-04-03 RX ADMIN — FENTANYL CITRATE 25 MCG: 50 INJECTION, SOLUTION INTRAMUSCULAR; INTRAVENOUS at 16:05

## 2025-04-03 RX ADMIN — LIDOCAINE HYDROCHLORIDE 5 ML: 10 INJECTION, SOLUTION INFILTRATION; PERINEURAL at 16:07

## 2025-04-03 RX ADMIN — MIDAZOLAM HYDROCHLORIDE 0.5 MG: 1 INJECTION, SOLUTION INTRAMUSCULAR; INTRAVENOUS at 16:08

## 2025-04-03 RX ADMIN — MIDAZOLAM HYDROCHLORIDE 0.5 MG: 1 INJECTION, SOLUTION INTRAMUSCULAR; INTRAVENOUS at 16:05

## 2025-04-03 ASSESSMENT — PAIN - FUNCTIONAL ASSESSMENT: PAIN_FUNCTIONAL_ASSESSMENT: 0-10

## 2025-04-03 ASSESSMENT — PAIN DESCRIPTION - LOCATION: LOCATION: FLANK

## 2025-04-03 ASSESSMENT — PAIN DESCRIPTION - ORIENTATION: ORIENTATION: RIGHT

## 2025-04-03 NOTE — ED PROVIDER NOTES
ED Attending Attestation Note     Date of evaluation: 4/3/2025    I have discussed the case with the resident. I have personally performed a history, physical exam, and my own medical decision making. I have reviewed the note and agree with the findings and plan.     INITIAL VITALS: BP: (!) 144/63, Temp: 98.1 °F (36.7 °C), Pulse: 75, Respirations: 18, SpO2: 98 %     MDM:  My assessment reveals an 85-year-old female presenting after her nephrostomy tube was accidentally pulled out.  IR was consulted and took the patient for replacement.  Unfortunately, when I went to replace the tube they suspect that there may be a fistula between the renal vein and collecting duct.  The tube was replaced but they recommend admission to the hospital overnight for observation and a urology consultation.  Urology consulted and patient will be admitted to the hospital overnight for serial hemoglobin checks and observation         Perry Rodrigez MD  04/03/25 [id number]

## 2025-04-03 NOTE — H&P
V2.0  History and Physical      Name:  Portia Waggoner /Age/Sex: 1940  (85 y.o. female)   MRN & CSN:  [id number] & [id number] Encounter Date/Time: 4/3/2025 6:09 PM EDT   Location:  [place] [address] PCP: Kristian Diaz MD       Hospital Day: 1    Assessment and Plan:   Portia Waggoner is a 85 y.o. female with a pmh of Bladder cancer with colovesicular fistula (healed now) initially managed with Moreno catheter then had nephrolithiasis status post PCNL  with subsequent development of severe right UPJ stenosis being managed with percutaneous nephrostomy tube recent exchange on 3/21 who presents with Nephrostomy tube displaced    Hospital Problems           Last Modified POA    * (Principal) Nephrostomy tube displaced 4/3/2025 Yes     Assessment and plan  #Dislodgment of nephrostomy tube  Status post IR guided right nephrostomy tube placement.  Per interventional radiologist there is a connection between renal vein and collecting duct patient is having bloody drainage from nephrostomy tube.  CBC showed hemoglobin 12.2.  Trend hemoglobin.  Urology evaluation.    #CKD stage III  Creatinine 1.8 appears to be baseline.    Disposition:   Current Living situation: home  Expected Disposition: home  Estimated D/C: 24 hours    Diet No diet orders on file Regular diet   DVT Prophylaxis [] Lovenox, []  Heparin, [x] SCDs, [] Ambulation,  [] Eliquis, [] Xarelto, [] Coumadin   Code Status Prior   Surrogate Decision Maker/ POA N/A     Personally reviewed Lab Studies and Imaging         History from:     patient    History of Present Illness:     Chief Complaint:   Chief Complaint   Patient presents with    Drain check/removal     Pt. Presents to ED with c/o dislodged right nephrostomy tube.        Portia Waggoner is a 85 y.o. female with pmh of Bladder cancer with colovesicular fistula (healed now) initially managed with Moreno catheter then had nephrolithiasis status post PCNL  with subsequent development of severe

## 2025-04-03 NOTE — ED PROVIDER NOTES
THE [facility]  EMERGENCY DEPARTMENT ENCOUNTER          EM RESIDENT NOTE       Date of evaluation: 4/3/2025    Chief Complaint     Drain check/removal (Pt. Presents to ED with c/o dislodged right nephrostomy tube. )      History of Present Illness     Portia Waggoner is a 85 y.o. female with a PMHx of CKD stage III, history of bladder cancer with vesicocutaneous fistula managed with a Moreno through the fistula and then more recently nephrolithiasis s/p R PCNL (Dr. Diaz 8/1) with subsequent development of severe R UPJ stenosis managed with a PCN, s/p failed attempt at R antegrade stent placement with IR on 12/4/ 23, with a PCN exchange on March 21  who presents with Drain check/removal (Pt. Presents to ED with c/o dislodged right nephrostomy tube. )  .    Patient reports for right nephrostomy tube that came out.  Patient reports with her son and son states that the patient has dementia and is a poor historian.  Son states that he found her this morning with the tube out of her right flank as she reported pain from the site.  He states that he is unsure when the tube actually came out and it could have been last night versus this morning.  He denies any fevers, purulent drainage, bleeding, other abnormalities with the PCN site.  Patient denies any other pain or symptoms other than right flank pain.  Denies any nausea or vomiting..    MEDICAL DECISION MAKING / ASSESSMENT / PLAN     INITIAL VITALS: BP: (!) 144/63, Temp: 98.1 °F (36.7 °C), Pulse: 75, Respirations: 18, SpO2: 98 %    ED Course as of 04/03/25 1739   Thu Apr 03, 2025   1442 On initial evaluation, patient is chronically ill-appearing and hemodynamically stable; in no apparent distress. [PM]   1516 IR consulted for dislodged nephrostomy tube and they will come down and replaced the tube.  Shared this with the patient and son and they agree. [PM]   1723 Spoke with IR following the procedure and they noted that there was a connection between the renal  used smokeless tobacco. She reports current alcohol use of about 1.0 standard drink of alcohol per week. She reports that she does not use drugs.    Medications     Previous Medications    ACETAMINOPHEN (TYLENOL) 500 MG TABLET    Take 1 tablet by mouth every 6 hours as needed for Pain    MELATONIN 5 MG TABS TABLET    Take 1 tablet by mouth nightly as needed (Insomnia)    MULTIPLE VITAMINS-MINERALS (THERAPEUTIC MULTIVITAMIN-MINERALS) TABLET    Take 1 tablet by mouth daily    NITROFURANTOIN, MACROCRYSTAL-MONOHYDRATE, (MACROBID) 100 MG CAPSULE    Take 1 capsule by mouth Once daily    QUETIAPINE (SEROQUEL) 25 MG TABLET    Take 1 tablet by mouth at bedtime for 14 days Once nightly       Allergies     She has no known allergies.    Physical Exam     INITIAL VITALS: BP: (!) 144/63, Temp: 98.1 °F (36.7 °C), Pulse: 75, Respirations: 18, SpO2: 98 %     General: 85 y.o. female, chronically ill-appearing; in no apparent distress.  Head: Normocephalic, atraumatic.   Eyes: Anicteric. PERRL, EOMI.  ENT: No nasal discharge. Mucous membranes are moist.  Neck: Supple, full ROM, trachea midline.   Pulmonary: Non-labored breathing. Lungs clear to auscultation bilaterally with symmetric aeration. No wheezes/rales/rhonchi.   Cardiac: Regular rate and rhythm. No murmurs/rubs/gallops.  Abdomen: Soft, non-tender, non-distended. No rebound or guarding.  Musculoskeletal: No long bone extremity deformity.  Right flank PCN site slightly erythematous around insertion site with tube hanging out by a suture and completely out of the patent site.  Extremities: Warm and well-perfused. No peripheral edema.  Skin: No rashes or bruising.  Neuro: Alert and oriented x3. Speech normal. Moves UE and LE spontaneously and symmetrically.  Psych: Mood and affect appropriate for situation.           Marcelino Gr MD  Resident  04/03/25 1918

## 2025-04-03 NOTE — ED NOTES
Patient Name: Portia Waggoner  : 1940 85 y.o.  MRN: [id number]  ED Room #: A03/A03-03     Chief complaint:   Chief Complaint   Patient presents with    Drain check/removal     Pt. Presents to ED with c/o dislodged right nephrostomy tube.      Hospital Problem/Diagnosis:   Hospital Problems           Last Modified POA    * (Principal) Nephrostomy tube displaced 4/3/2025 Yes         O2 Flow Rate:O2 Device: None (Room air)   (if applicable)  Cardiac Rhythm:   (if applicable)  Active LDA's:   Peripheral IV 25 Left Antecubital (Active)       Peripheral IV 25 Left Antecubital (Active)      Nephrostomy Tube 1 Right Flank 8 fr (Active)          How does patient ambulate?  High fall risk  ambulates 1 assist    2. How does patient take pills? Unknown, no oral medications were given in the Emergency Department    3. Is patient alert? Alert    4. Is patient oriented? To Person gets confused to place and time has dementia    5.   Patient arrived from:  home  Facility Name: ___________________________________________    6. If patient is disoriented or from a Skill Nursing Facility has family been notified of admission? Yes    7. Patient belongings? Belongings: Clothing    Disposition of belongings? Kept with Patient     8. Any specific patient or family belongings/needs/dynamics?   a.     9. Miscellaneous comments/pending orders?  a.       If there are any additional questions please reach out to the Emergency Department.      Sophie Fontanez, RN  [phone]

## 2025-04-04 ENCOUNTER — HOSPITAL ENCOUNTER (OUTPATIENT)
Dept: INTERVENTIONAL RADIOLOGY/VASCULAR | Age: 85
Setting detail: OBSERVATION
End: 2025-04-04
Attending: RADIOLOGY
Payer: MEDICARE

## 2025-04-04 LAB
ANION GAP SERPL CALCULATED.3IONS-SCNC: 9 MMOL/L (ref 3–16)
BASOPHILS # BLD: 0 K/UL (ref 0–0.2)
BASOPHILS NFR BLD: 0.6 %
BUN SERPL-MCNC: 22 MG/DL (ref 7–20)
CALCIUM SERPL-MCNC: 9.1 MG/DL (ref 8.3–10.6)
CHLORIDE SERPL-SCNC: 108 MMOL/L (ref 99–110)
CO2 SERPL-SCNC: 23 MMOL/L (ref 21–32)
CREAT SERPL-MCNC: 1.6 MG/DL (ref 0.6–1.2)
DEPRECATED RDW RBC AUTO: 12.6 % (ref 12.4–15.4)
EOSINOPHIL # BLD: 0.3 K/UL (ref 0–0.6)
EOSINOPHIL NFR BLD: 4 %
GFR SERPLBLD CREATININE-BSD FMLA CKD-EPI: 31 ML/MIN/{1.73_M2}
GLUCOSE SERPL-MCNC: 93 MG/DL (ref 70–99)
HCT VFR BLD AUTO: 38.2 % (ref 36–48)
HGB BLD-MCNC: 12.6 G/DL (ref 12–16)
LYMPHOCYTES # BLD: 2.3 K/UL (ref 1–5.1)
LYMPHOCYTES NFR BLD: 32.9 %
MCH RBC QN AUTO: 30.6 PG (ref 26–34)
MCHC RBC AUTO-ENTMCNC: 32.9 G/DL (ref 31–36)
MCV RBC AUTO: 92.9 FL (ref 80–100)
MONOCYTES # BLD: 0.4 K/UL (ref 0–1.3)
MONOCYTES NFR BLD: 6.4 %
NEUTROPHILS # BLD: 3.9 K/UL (ref 1.7–7.7)
NEUTROPHILS NFR BLD: 56.1 %
PLATELET # BLD AUTO: 218 K/UL (ref 135–450)
PMV BLD AUTO: 7.4 FL (ref 5–10.5)
POTASSIUM SERPL-SCNC: 4.9 MMOL/L (ref 3.5–5.1)
RBC # BLD AUTO: 4.11 M/UL (ref 4–5.2)
SODIUM SERPL-SCNC: 140 MMOL/L (ref 136–145)
WBC # BLD AUTO: 6.9 K/UL (ref 4–11)

## 2025-04-04 PROCEDURE — 6360000002 HC RX W HCPCS: Performed by: NURSE PRACTITIONER

## 2025-04-04 PROCEDURE — 85025 COMPLETE CBC W/AUTO DIFF WBC: CPT

## 2025-04-04 PROCEDURE — 2580000003 HC RX 258: Performed by: INTERNAL MEDICINE

## 2025-04-04 PROCEDURE — 96375 TX/PRO/DX INJ NEW DRUG ADDON: CPT

## 2025-04-04 PROCEDURE — 6370000000 HC RX 637 (ALT 250 FOR IP): Performed by: INTERNAL MEDICINE

## 2025-04-04 PROCEDURE — 36415 COLL VENOUS BLD VENIPUNCTURE: CPT

## 2025-04-04 PROCEDURE — 96365 THER/PROPH/DIAG IV INF INIT: CPT

## 2025-04-04 PROCEDURE — 6360000002 HC RX W HCPCS: Performed by: INTERNAL MEDICINE

## 2025-04-04 PROCEDURE — G0378 HOSPITAL OBSERVATION PER HR: HCPCS

## 2025-04-04 PROCEDURE — 50431 NJX PX NFROSGRM &/URTRGRM: CPT

## 2025-04-04 PROCEDURE — 80048 BASIC METABOLIC PNL TOTAL CA: CPT

## 2025-04-04 PROCEDURE — 2500000003 HC RX 250 WO HCPCS: Performed by: INTERNAL MEDICINE

## 2025-04-04 RX ORDER — ACETAMINOPHEN 500 MG
500 TABLET ORAL EVERY 6 HOURS PRN
COMMUNITY

## 2025-04-04 RX ORDER — QUETIAPINE FUMARATE 25 MG/1
25 TABLET, FILM COATED ORAL ONCE
Status: COMPLETED | OUTPATIENT
Start: 2025-04-04 | End: 2025-04-04

## 2025-04-04 RX ORDER — QUETIAPINE FUMARATE 25 MG/1
25 TABLET, FILM COATED ORAL 2 TIMES DAILY
COMMUNITY

## 2025-04-04 RX ADMIN — LORAZEPAM 0.5 MG: 2 INJECTION INTRAMUSCULAR; INTRAVENOUS at 00:34

## 2025-04-04 RX ADMIN — QUETIAPINE 25 MG: 25 TABLET, FILM COATED ORAL at 14:42

## 2025-04-04 RX ADMIN — SODIUM CHLORIDE, PRESERVATIVE FREE 5 ML: 5 INJECTION INTRAVENOUS at 00:35

## 2025-04-04 RX ADMIN — ACETAMINOPHEN 650 MG: 325 TABLET ORAL at 00:34

## 2025-04-04 RX ADMIN — CEFEPIME 2000 MG: 2 INJECTION, POWDER, FOR SOLUTION INTRAVENOUS at 14:37

## 2025-04-04 RX ADMIN — SODIUM CHLORIDE, PRESERVATIVE FREE 10 ML: 5 INJECTION INTRAVENOUS at 09:01

## 2025-04-04 ASSESSMENT — PAIN - FUNCTIONAL ASSESSMENT: PAIN_FUNCTIONAL_ASSESSMENT: ACTIVITIES ARE NOT PREVENTED

## 2025-04-04 ASSESSMENT — PAIN SCALES - WONG BAKER
WONGBAKER_NUMERICALRESPONSE: NO HURT
WONGBAKER_NUMERICALRESPONSE: NO HURT

## 2025-04-04 ASSESSMENT — PAIN DESCRIPTION - DESCRIPTORS
DESCRIPTORS: ACHING
DESCRIPTORS: ACHING

## 2025-04-04 ASSESSMENT — PAIN DESCRIPTION - PAIN TYPE: TYPE: OTHER (COMMENT)

## 2025-04-04 ASSESSMENT — PAIN DESCRIPTION - ONSET: ONSET: OTHER (COMMENT)

## 2025-04-04 ASSESSMENT — PAIN DESCRIPTION - FREQUENCY: FREQUENCY: OTHER (COMMENT)

## 2025-04-04 ASSESSMENT — PAIN DESCRIPTION - LOCATION
LOCATION: SHOULDER
LOCATION: OTHER (COMMENT)

## 2025-04-04 ASSESSMENT — PAIN DESCRIPTION - ORIENTATION: ORIENTATION: RIGHT;LOWER

## 2025-04-04 NOTE — PROGRESS NOTES
Patient oriented to person, disoriented to place, time and location. Patient restless and anxious throughout shift. Nephrostomy tube draining, large clots present and Hospitalist made aware. Patient voiding. No bm passed this shift. Ativan given for anxiety and tylenol given for pain per orders, see MAR. Patient resting comfortably in bed at this time. Call light within reach, bed locked and in lowest position, bed alarm engaged, safety camera in use and door open.  Vitals:    04/04/25 0239   BP: 123/70   Pulse: 71   Resp: 18   Temp: 97.3 °F (36.3 °C)   SpO2: 93%   Electronically signed by Brittnee Lee RN on 4/4/2025 at 4:50 AM

## 2025-04-04 NOTE — PROGRESS NOTES
Writer attempted to call patient's son to complete admission documentation, no answer at this time.  Electronically signed by Brittnee Lee RN on 4/4/2025 at 6:40 AM

## 2025-04-04 NOTE — PROGRESS NOTES
Pharmacy Note - Extended Infusion Beta-Lactam Adjustment    Cefepime 2000mg Q8h for treatment of Urinary tract infection. Per [facility] Extended Infusion Beta-Lactam Policy, cefepime will be changed to 2000mg load followed by 1000mg Q24h extended infusion    Estimated Creatinine Clearance: Estimated Creatinine Clearance: 18 mL/min (A) (based on SCr of 1.6 mg/dL (H)).    Dialysis Status, ABNER, CKD: CKD stage III    BMI: Body mass index is 22.62 kg/m².    Rationale for Adjustment: Agent is renally eliminated and demonstrates time-dependent effect on bacterial eradication. Extended-infusion dosing strategy aims to enhance microbiologic and clinical efficacy.    Pharmacy will continue to monitor renal function, cultures and sensitivities (where available) and adjust dose as necessary.      Please call with any questions.    Thank you,    Ashia Herndon [facility]

## 2025-04-04 NOTE — CONSULTS
IR consult complete. Successful replacement of left 8f neph tube. 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 Repeat nephrostogram 4/4 demonstrated resolution of fistula. Okay for DC per IR, will plan for regular catheter exchange in 6 weeks.

## 2025-04-04 NOTE — CARE COORDINATION
Case Management Assessment  Initial Evaluation    Date/Time of Evaluation: 4/4/2025 2:01 PM  Assessment Completed by: Rizwana Hanks RN    If patient is discharged prior to next notation, then this note serves as note for discharge by case management.    Patient Name: Portia Waggoner                   YOB: 1940  Diagnosis: Nephrolithiasis [N20.0]  Nephrostomy tube displaced [T83.022A]                   Date / Time: 4/3/2025  2:29 PM    Patient Admission Status: Observation   Readmission Risk (Low < 19, Mod (19-27), High > 27): Readmission Risk Score: 16.8    Current PCP: Kristian Diaz MD  PCP verified by CM? No (Does not have one currently)    Chart Reviewed: Yes      History Provided by: Medical Record, Child/Family  Patient Orientation: Alert and Oriented, Person    Patient Cognition: Short Term Memory Deficit    Hospitalization in the last 30 days (Readmission):  No    If yes, Readmission Assessment in  Navigator will be completed.    Advance Directives:      Code Status: Full Code   Patient's Primary Decision Maker is: Legal Next of Kin      Discharge Planning:    Patient lives with: Children Type of Home: House  Primary Care Giver: Family  Patient Support Systems include: Children   Current Financial resources: Medicare  Current community resources: None  Current services prior to admission: None            Current DME:              Type of Home Care services:  None    ADLS  Prior functional level: Assistance with the following:, Housework, Cooking, Shopping  Current functional level: Assistance with the following:, Cooking, Housework, Shopping    PT AM-PAC:   /24  OT AM-PAC:   /24    Family can provide assistance at DC: Yes  Would you like Case Management to discuss the discharge plan with any other family members/significant others, and if so, who? Yes ([place])  Plans to Return to Present Housing: Unknown at present  Other Identified Issues/Barriers to RETURNING to current housing:  [FreeTextEntry1] : Rest, fluids, flonase, xyzal, vitamin C, salt water gargles, tea with honey.\par     - patient instructed to RTO if symptoms worsen or persist, if fevers develop, does not get better in 7 days.\par \par Weight gain, obesity: dietary guidelines reviewed - advised increase veggie intake, decrease carbs. RTO in 3 months for follow up.

## 2025-04-04 NOTE — CONSULTS
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 Had attempt at R antegrade stent placement with IR on  that was unsuccessful. She has had a few instances where she cuts her tubing due to confusion and has to have tubes replaced. She was seen in the ER 24 after her martinez through the fistula fell out. ER replaced her catheter through the urethra. We removed her urethral catheter and she has been able to void since. Her fistula healed on its own. Last admission in 2024 after nephrostomy tube was not draining. She has not been admitted to hospital since that time until yesterday when she presented to the hospital for dislodged nephrostomy tube. During exchange with IR, it was noted that there was a connection between her renal vein and the collecting duct. It was recommended she be admitted overnight for observation.     Family History, Social History, Review of Systems:  Reviewed and agreed to as per chart    Vitals:  /72   Pulse 64   Temp 98.2 °F (36.8 °C) (Oral)   Resp 18   Wt 52.5 kg (115 lb 12.8 oz)   SpO2 94%   BMI 22.62 kg/m²   Temp  Av.8 °F (36.6 °C)  Min: 97.3 °F (36.3 °C)  Max: 98.2 °F (36.8 °C)    Intake/Output Summary (Last 24 hours) at 2025 1053  Last data filed at 2025 0437  Gross per 24 hour   Intake --   Output 201 ml   Net -201 ml         Physical:  Well developed, well nourished in no acute distress  Pleasantly confused  Neck is supple, trachea is midline  Respiratory effort is normal  Cardiovascular show no extremity swelling  R nephrostomy tube with red tinged urine.       Labs:  WBC:    Lab Results   Component Value Date/Time    WBC 6.9 2025

## 2025-04-04 NOTE — PROGRESS NOTES
Pt has been A&O to person only, very pleasant but not easy to redirect, pt was requiring a sitter in afternoon for frequently getting OOB without assistance. Nephrostomy has been draining red urine, no clots noted this shift. Pt was able to tolerate nephrostogram with no sedation, Urology had confirmed that from their standpoint she could discharge, when RN informed hospitalist they said that they wanted to monitor overnight for bleeding. RN called bruce Avila to inform of plan, who visited this evening.     Electronically signed by Patrice Marie RN on 4/4/2025 at 6:44 PM

## 2025-04-04 NOTE — PROGRESS NOTES
V2.0    [facility] Progress Note      Name:  Portia Waggoner /Age/Sex: 1940  (85 y.o. female)   MRN & CSN:  [id number] & [id number] Encounter Date/Time: 2025 8:14 AM EDT   Location:  53/5310-01 PCP: Kristian Diaz MD     Attending:Christen Jung MD       Hospital Day: 2    HPI:    Assessment and Recommendations     Hospital course:    Portia Waggoner is a 85 y.o. female with pmh of healed colovesicular fistula, nephrolithiasis status post PCNL in  resulting in severe UPJ stenosis managed with percutaneous nephrostomy tube, CKD stage III who presents with Nephrostomy tube displaced.  Her tube was accidentally pulled out.  IR took the patient for tube replacement on 4/3/2024 but was concerned there may be a fistula between the renal vein and collecting duct and recommended patient be admitted for overnight observation and urology consult Tatian      Plan:     Dislodgment of nephrostomy tube, now with hematuria after tube replacement-monitor overnight to make sure hematuria resolves  -Admitted overnight as there was concern for possible connection between the renal vein and collecting duct.  Hemoglobin stable at 12  -Urology consulted, discussed with urology.  Urology will talk to IR about possible nephrostogram.  -Status post IR guided nephrostomy tube replaced on   -Patient has chronic nephrostomy tube after PCNL resulting in UPJ obstruction  -In the past had MDRO infection.  Urology recommends starting antibiotics, will start cefepime        CKD stage III  -Baseline creatinine 1.8    Cognitive impairment with dementia  -Patient oriented x 2.  Knows she is in the hospital but has poor insight into any other condition    I spent > 55  minutes in the care of this patient.  Over 50% of that time was in face-to-face counseling regarding disease process, diagnostic testing, preventative measures, and answering patient and family questions.     Diet ADULT DIET; Regular   DVT Prophylaxis []

## 2025-04-04 NOTE — PROGRESS NOTES
4 Eyes Skin Assessment     NAME:  Portia Waggoner  YOB: 1940  MEDICAL RECORD NUMBER:  [id number]    The patient is being assessed for  Admission    I agree that at least one RN has performed a thorough Head to Toe Skin Assessment on the patient. ALL assessment sites listed below have been assessed.      Areas assessed by both nurses:    Head, Face, Ears, Shoulders, Back, Chest, Arms, Elbows, Hands, Sacrum. Buttock, Coccyx, Ischium, Legs. Feet and Heels, Under Medical Devices , and Other          Does the Patient have a Wound? No noted wound(s)       Manfred Prevention initiated by RN: Yes  Wound Care Orders initiated by RN: No    Pressure Injury (Stage 3,4, Unstageable, DTI, NWPT, and Complex wounds) if present, place Wound referral order by RN under : No    New Ostomies, if present place, Ostomy referral order under : No     Nurse 1 eSignature: Electronically signed by Brittnee Lee RN on 4/4/25 at 5:49 AM EDT    **SHARE this note so that the co-signing nurse can place an eSignature**    Nurse 2 eSignature: Electronically signed by Gwendolyn Fuchs RN on 4/4/25 at 6:06 AM EDT     Patient admitted to room 5310 from ED. Patient lives at home with son and has pmh of dementia. Patient is A&O x 1. VSS with exception of elevated bp. Hospitalist made aware of high bp and orientation. Patient oriented to the room all safety measures in place. Admission orders released and patient 4 eyes completed. Admission documentation not completed at this time due to patient's cognitive status at baseline. No other needs are noted at this time.    [x] Bed alarm on and cord plugged into wall  [x] Bed in lowest position  [x] Call light and bedside table within reach  [x] Patient educated on all safety measures  []Oxygen connected to wall (if applicable)     Nurse 1 Esignature: Electronically signed by Brittnee Lee RN on 4/4/25 at 5:52 AM EDT  Nurse 2 Esignature: Electronically signed by Gwendolyn

## 2025-04-05 VITALS
HEART RATE: 57 BPM | TEMPERATURE: 97.5 F | DIASTOLIC BLOOD PRESSURE: 81 MMHG | BODY MASS INDEX: 22.62 KG/M2 | WEIGHT: 115.8 LBS | SYSTOLIC BLOOD PRESSURE: 139 MMHG | RESPIRATION RATE: 22 BRPM | OXYGEN SATURATION: 100 %

## 2025-04-05 PROCEDURE — 96376 TX/PRO/DX INJ SAME DRUG ADON: CPT

## 2025-04-05 PROCEDURE — G0378 HOSPITAL OBSERVATION PER HR: HCPCS

## 2025-04-05 PROCEDURE — 6360000002 HC RX W HCPCS: Performed by: NURSE PRACTITIONER

## 2025-04-05 RX ADMIN — LORAZEPAM 0.5 MG: 2 INJECTION INTRAMUSCULAR; INTRAVENOUS at 01:42

## 2025-04-05 NOTE — CARE COORDINATION
Case Management Assessment            Discharge Note                    Date / Time of Note: 4/5/2025 8:49 AM                  Discharge Note Completed by: PRAVIN BERTRAND    Patient Name: Portia Waggoner   YOB: 1940  Diagnosis: Nephrolithiasis [N20.0]  Nephrostomy tube displaced [T83.022A]   Date / Time: 4/3/2025  2:29 PM    Current PCP: Kristian Diaz MD  Clinic patient: No    Hospitalization in the last 30 days: No       Advance Directives:  Code Status: Full Code  [place] DNR form completed and on chart: Not indicated    Financial:  Payor: [facility] MEDICARE / Plan: [facility] OPTUM PLAN AARP [facility] ADV / Product Type: *No Product type* /      Pharmacy:    [id number] - [place], [place] - [address]  - P [phone] - F [phone]  [address]  [facility] [address]  Phone: [phone] Fax: [phone]      Assistance purchasing medications?: Potential Assistance Purchasing Medications: No  Assistance provided by Case Management: None at this time    Does patient want to participate in local refill/ meds to beds program?:      Meds To Beds General Rules:  1. Can ONLY be done Monday- Friday between 8:30am-5pm  2. Prescription(s) must be in pharmacy by 3pm to be filled same day  3.Copy of patient's insurance/ prescription drug card and patient face sheet must be sent along with the prescription(s)  4. Cost of Rx cannot be added to hospital bill. If financial assistance is needed, please contact unit  or ;  or  CANNOT provide pharmacy voucher for patients co-pays  5. Patients can then  the prescription on their way out of the hospital at discharge, or pharmacy can deliver to the bedside if staff is available. (payment due at time of pick-up or delivery - cash, check, or card accepted)     Able to afford home medications/ co-pay costs: Yes    ADLS:  Current PT AM-PAC Score:   /24  Current OT AM-PAC Score:    [facility]  Case Management Department  Ph: [phone]  Fax:

## 2025-04-05 NOTE — PROGRESS NOTES
Urology Attending Progress Note      Subjective: She is sleeping on rounds. Not answering my questions    Vitals:  /81   Pulse 57   Temp 97.5 °F (36.4 °C) (Oral)   Resp 22   Wt 52.5 kg (115 lb 12.8 oz)   SpO2 100%   BMI 22.62 kg/m²   Temp  Av.9 °F (36.6 °C)  Min: 97.5 °F (36.4 °C)  Max: 98.4 °F (36.9 °C)    Intake/Output Summary (Last 24 hours) at 2025 0940  Last data filed at 2025 1636  Gross per 24 hour   Intake 100.19 ml   Output --   Net 100.19 ml       Exam: R nephrostomy tube with rian output    Labs:  WBC:    Lab Results   Component Value Date/Time    WBC 6.9 2025 07:09 AM     Hemoglobin/Hematocrit:    Lab Results   Component Value Date/Time    HGB 12.6 2025 07:09 AM    HCT 38.2 2025 07:09 AM     BMP:    Lab Results   Component Value Date/Time     2025 07:09 AM    K 4.9 2025 07:09 AM     2025 07:09 AM    CO2 23 2025 07:09 AM    BUN 22 2025 07:09 AM    CREATININE 1.6 2025 07:09 AM    CALCIUM 9.1 2025 07:09 AM    GFRAA 32 2022 10:21 AM    GFRAA >60 2011 11:09 AM    LABGLOM 31 2025 07:09 AM    LABGLOM 31 2024 06:20 AM     PT/INR:    Lab Results   Component Value Date/Time    PROTIME 14.0 2025 05:07 PM    INR 1.06 2025 05:07 PM     PTT:  No results found for: \"APTT\"[APTT      Imaging: IMPRESSION: Nephrostogram 25     The previously seen fistula is no longer opacified. The catheter is in standard  position.     Cumulative air kerma 31 mGy  Blood loss : minimal (less than 5 cc)  Specimens removed : none     Electronically signed by Gio Traiforos      Impression/Plan: 84yo F with history stated above admitted following nephrostomy tube exchange 4/3/25 with concern of fistula from her renal vein to the collecting duct.      -Repeat nephrostogram yesterday showing resolution of previous fistula  -Hgb stable  -Fine to DC from our standpoint. She will need nephrostomy tube  exchanged in 8-12 weeks  -Please call with any questions      GURWINDER Chacon

## 2025-04-05 NOTE — PLAN OF CARE
Problem: Discharge Planning  Goal: Discharge to home or other facility with appropriate resources  Recent Flowsheet Documentation  Taken 4/4/2025 2009 by Brittnee Lee, RN  Discharge to home or other facility with appropriate resources:   Identify barriers to discharge with patient and caregiver   Arrange for needed discharge resources and transportation as appropriate   Identify discharge learning needs (meds, wound care, etc)     Problem: Risk for Elopement  Goal: Patient will not exit the unit/facility without proper excort  Recent Flowsheet Documentation  Taken 4/5/2025 0127 by Brittnee Lee, RN  Nursing Interventions for Elopement Risk:   Assist with personal care needs such as toileting, eating, dressing, as needed to reduce the risk of wandering   Collaborate with family members/caregivers to mitigate the elopement risk  Taken 4/4/2025 2300 by Brittnee Lee, RN  Nursing Interventions for Elopement Risk:   Assist with personal care needs such as toileting, eating, dressing, as needed to reduce the risk of wandering   Collaborate with family members/caregivers to mitigate the elopement risk  Taken 4/4/2025 2009 by Brittnee Lee, RN  Nursing Interventions for Elopement Risk:   Assist with personal care needs such as toileting, eating, dressing, as needed to reduce the risk of wandering   Collaborate with family members/caregivers to mitigate the elopement risk

## 2025-04-05 NOTE — PROGRESS NOTES
V2.0    [facility] [place]      Name:  Portia Waggoner /Age/Sex: 1940  (85 y.o. female)   MRN & CSN:  [id number] & [id number] Encounter Date/Time: 2025 8:14 AM EDT   Location:  [facility] PCP: Kristian Diaz MD     Attending:Christen Jung MD       Hospital Day: 3    HPI:      Hospital course:    Portia Waggoner is a 85 y.o. female with pmh of healed colovesicular fistula, nephrolithiasis status post PCNL in  resulting in severe UPJ stenosis managed with percutaneous nephrostomy tube, CKD stage III who presents with Nephrostomy tube displaced.  Her tube was accidentally pulled out.  IR took the patient for tube replacement on 4/3/2024 but was concerned there may be a fistula between the renal vein and collecting duct and recommended patient be admitted for overnight observation and urology consult .  Patient had right antegrade nephrostogram on 2025 that showed no fistula.  At the time of discharge patient's hemoglobin is stable.  Needs nephrostomy tube exchange in 8 to 12 weeks      Plan:     Dislodgment of nephrostomy tube, now with hematuria after tube replacement-hematuria resolved  -Admitted overnight as there was concern for possible connection between the renal vein and collecting duct.  Hemoglobin stable at 12  -Nephrostogram after replacement of nephrostomy tube she was single/fistula  -Status post IR guided nephrostomy tube replaced on   -Patient has chronic nephrostomy tube after PCNL resulting in UPJ obstruction          CKD stage III  -Baseline creatinine 1.8    Cognitive impairment with dementia, at present lethargic as patient got Ativan overnight  -Patient oriented x 2.  Knows she is in the hospital but has poor insight into any other condition    I spent > 55  minutes in the care of this patient.  Over 50% of that time was in face-to-face counseling regarding disease process, diagnostic testing, preventative measures, and answering patient and family          Electronically signed by Christen Jung MD on 4/5/2025 at 8:31 AM  Comment: Please note this report has been produced using speech recognition software and may contain errors related to that system including errors in grammar, punctuation, and spelling, as well as words and phrases that may be inappropriate. If there are any questions or concerns, please feel free to contact the dictating provider for clarification.

## 2025-04-05 NOTE — DISCHARGE SUMMARY
V2.0  Discharge Summary    Name:  Portia Waggoner /Age/Sex: 1940 (85 y.o. female)   Admit Date: 4/3/2025  Discharge Date: 25    MRN & CSN:  [id number] & [id number] Encounter Date and Time 25 10:21 AM EDT    Attending:  Christen Jung MD Discharging Provider: Christen Jung MD       Hospital Course:     Hospital course:     Portia Waggoner is a 85 y.o. female with pmh of healed colovesicular fistula, nephrolithiasis status post PCNL in  resulting in severe UPJ stenosis managed with percutaneous nephrostomy tube, CKD stage III who presents with Nephrostomy tube displaced.  Her tube was accidentally pulled out.  IR took the patient for tube replacement on 4/3/2024 but was concerned there may be a fistula between the renal vein and collecting duct and recommended patient be admitted for overnight observation and urology consult .  Patient had right antegrade nephrostogram on 2025 that showed no fistula.  At the time of discharge patient's hemoglobin is stable.  Needs nephrostomy tube exchange in 8 to 12 weeks        Plan:      Dislodgment of nephrostomy tube, now with hematuria after tube replacement-hematuria resolved  -Admitted overnight as there was concern for possible connection between the renal vein and collecting duct.  Hemoglobin stable at 12  -Nephrostogram after replacement of nephrostomy tube she was single/fistula  -Status post IR guided nephrostomy tube replaced on   -Patient has chronic nephrostomy tube after PCNL resulting in UPJ obstruction              CKD stage III  -Baseline creatinine 1.8     Cognitive impairment with dementia      The patient expressed appropriate understanding of, and agreement with the discharge recommendations, medications, and plan.     Consults this admission:  IP CONSULT TO INTERVENTIONAL RADIOLOGY  IP CONSULT TO UROLOGY    Discharge Diagnosis:   Nephrostomy tube displaced  Cognitive impaired    Discharge Instruction:   Follow up

## 2025-04-05 NOTE — PROGRESS NOTES
Pt discharged to bruce Avila. Pt remains confused, agitated and not redirectable. Nephrostomy secured to leg with leg straps. IV removed. AVS given and discussed with son. Son then removed pt from room.

## 2025-05-15 ENCOUNTER — HOSPITAL ENCOUNTER (OUTPATIENT)
Dept: INTERVENTIONAL RADIOLOGY/VASCULAR | Age: 85
Discharge: HOME | End: 2025-05-15
Attending: STUDENT IN AN ORGANIZED HEALTH CARE EDUCATION/TRAINING PROGRAM

## 2025-05-22 ENCOUNTER — TELEPHONE (OUTPATIENT)
Dept: INTERNAL MEDICINE CLINIC | Age: 85
End: 2025-05-22

## 2025-05-22 NOTE — TELEPHONE ENCOUNTER
Called son doris no answer. [facility] for him to call back and discuss his mother becoming a new patient.    [phone]

## 2025-05-22 NOTE — TELEPHONE ENCOUNTER
----- Message from Earl PAM sent at 5/20/2025 12:50 PM EDT -----  [facility] Appointment Request    Patient needs appointment for [facility] Appointment Type: New Patient.    Patient Requested Dates(s): as soon as possible   Patient Requested Time: anytime  Provider Name: Nneka Schmidt MD    Reason for Appointment Request: New Patient - No appointments available during search  --------------------------------------------------------------------------------------------------------------------------  Patient wants to be establish with Nneka Schmidt MD  Relationship to Patient: Guardian Son doris thompson    Call Back Information: OK to leave message on voicemail  Preferred Call Back Number: Phone [phone]

## 2025-06-16 ENCOUNTER — OFFICE VISIT (OUTPATIENT)
Dept: INTERNAL MEDICINE CLINIC | Age: 85
End: 2025-06-16
Payer: MEDICARE

## 2025-06-16 VITALS
SYSTOLIC BLOOD PRESSURE: 106 MMHG | HEIGHT: 60 IN | OXYGEN SATURATION: 98 % | DIASTOLIC BLOOD PRESSURE: 68 MMHG | TEMPERATURE: 98.1 F | HEART RATE: 91 BPM | WEIGHT: 119 LBS | BODY MASS INDEX: 23.36 KG/M2

## 2025-06-16 DIAGNOSIS — N20.0 NEPHROLITHIASIS: Primary | ICD-10-CM

## 2025-06-16 DIAGNOSIS — Z00.00 PREVENTATIVE HEALTH CARE: ICD-10-CM

## 2025-06-16 DIAGNOSIS — Z98.890 H/O INSERTION OF NEPHROSTOMY TUBE: ICD-10-CM

## 2025-06-16 DIAGNOSIS — G30.1 MODERATE LATE ONSET ALZHEIMER'S DEMENTIA WITH OTHER BEHAVIORAL DISTURBANCE (HCC): ICD-10-CM

## 2025-06-16 DIAGNOSIS — F02.B18 MODERATE LATE ONSET ALZHEIMER'S DEMENTIA WITH OTHER BEHAVIORAL DISTURBANCE (HCC): ICD-10-CM

## 2025-06-16 DIAGNOSIS — N18.32 STAGE 3B CHRONIC KIDNEY DISEASE (HCC): ICD-10-CM

## 2025-06-16 PROCEDURE — 1123F ACP DISCUSS/DSCN MKR DOCD: CPT | Performed by: HOSPITALIST

## 2025-06-16 PROCEDURE — 1159F MED LIST DOCD IN RCRD: CPT | Performed by: HOSPITALIST

## 2025-06-16 PROCEDURE — 99205 OFFICE O/P NEW HI 60 MIN: CPT | Performed by: HOSPITALIST

## 2025-06-16 PROCEDURE — G2211 COMPLEX E/M VISIT ADD ON: HCPCS | Performed by: HOSPITALIST

## 2025-06-16 SDOH — ECONOMIC STABILITY: FOOD INSECURITY: WITHIN THE PAST 12 MONTHS, THE FOOD YOU BOUGHT JUST DIDN'T LAST AND YOU DIDN'T HAVE MONEY TO GET MORE.: NEVER TRUE

## 2025-06-16 SDOH — ECONOMIC STABILITY: FOOD INSECURITY: WITHIN THE PAST 12 MONTHS, YOU WORRIED THAT YOUR FOOD WOULD RUN OUT BEFORE YOU GOT MONEY TO BUY MORE.: NEVER TRUE

## 2025-06-16 ASSESSMENT — ENCOUNTER SYMPTOMS
ABDOMINAL DISTENTION: 0
SINUS PRESSURE: 0
BACK PAIN: 0
BLOOD IN STOOL: 0
TROUBLE SWALLOWING: 0
COUGH: 0
CONSTIPATION: 0
DIARRHEA: 0
SINUS PAIN: 0
ABDOMINAL PAIN: 0
VOMITING: 0
WHEEZING: 0
SHORTNESS OF BREATH: 0
SORE THROAT: 0
CHEST TIGHTNESS: 0
NAUSEA: 0
VOICE CHANGE: 0

## 2025-06-16 ASSESSMENT — PATIENT HEALTH QUESTIONNAIRE - PHQ9
SUM OF ALL RESPONSES TO PHQ QUESTIONS 1-9: 0
1. LITTLE INTEREST OR PLEASURE IN DOING THINGS: NOT AT ALL
2. FEELING DOWN, DEPRESSED OR HOPELESS: NOT AT ALL
SUM OF ALL RESPONSES TO PHQ QUESTIONS 1-9: 0

## 2025-06-16 NOTE — PROGRESS NOTES
[facility]  Establish care visit   2025    Portia Waggoner (:  1940) is a 85 y.o. female, here to establish care.    Chief Complaint   Patient presents with    New Patient     Establishing care ,seeking new provider         Patient Active Problem List   Diagnosis    Solar purpura    Post-op pain    Renal calculi    Nephrolithiasis    Hydronephrosis    ABNER (acute kidney injury)    Electrolyte imbalance    Hypertension    Stage 3b chronic kidney disease (HCC)    Malfunction of nephrostomy tube    Complicated UTI (urinary tract infection)    Sepsis (HCC)    Nephrostomy tube displaced    H/O insertion of nephrostomy tube       HPI  The patient is here with her son Shahriar Waggoner to establish care. He is in [employer] construction and gone from the home 10 hours a day.  There is a care-giver Edith who comes to help sporadically.         The patient has a complicated past medical history including a healed colovesicular fistula, nephrolithiasis status post a percutaneous nephrolithotomy in .  She has severe UPJ stenosis after this and has a chronic indwelling percutaneous nephrostomy tube.  She also has CKD stage III managed by Dr. Carson.  Her urologist is Dr. Garcia.      She also has dementia with sundowning behaviors.  She was previously on Seroquel 25 mg twice daily for this.  She previously saw [facility] and had imaging done, but overall they felt the experience was suboptimal, and they want referral for a new physician.  I have given them a referral to Dr. Vannesa Faulkner.  Patient wanted a sleep aid so I have asked him to try Melatonin.     ROS  Review of Systems   Constitutional:  Negative for activity change, appetite change, chills, diaphoresis, fatigue, fever and unexpected weight change.   HENT:  Negative for sinus pressure, sinus pain, sore throat, trouble swallowing and voice change.    Eyes:  Negative for visual disturbance.   Respiratory:  Negative for cough, chest

## 2025-07-01 NOTE — PRE-PROCEDURE INSTRUCTIONS
Attempted to call patient about procedure. No answer. Voicemail left. Told to be here at 1230 for procedure at 1400. NPO after midnight, but can take morning medication with sips of water. Office should have told them when and if they should stop any blood thinners. Told to have a responsible adult be with the patient to take them home and stay with them afterwards, if they are not admitted to hospital afterwards. And if available bring current list of medications.

## 2025-07-02 ENCOUNTER — HOSPITAL ENCOUNTER (OUTPATIENT)
Dept: INTERVENTIONAL RADIOLOGY/VASCULAR | Age: 85
Discharge: HOME | End: 2025-07-04
Attending: INTERNAL MEDICINE
Payer: MEDICARE

## 2025-07-02 VITALS
HEART RATE: 69 BPM | TEMPERATURE: 97.8 F | RESPIRATION RATE: 16 BRPM | DIASTOLIC BLOOD PRESSURE: 58 MMHG | BODY MASS INDEX: 23.24 KG/M2 | HEIGHT: 60 IN | OXYGEN SATURATION: 98 % | SYSTOLIC BLOOD PRESSURE: 129 MMHG

## 2025-07-02 DIAGNOSIS — N99.528 COMPLICATION OF NEPHROSTOMY: ICD-10-CM

## 2025-07-02 PROCEDURE — 6360000004 HC RX CONTRAST MEDICATION: Performed by: RADIOLOGY

## 2025-07-02 PROCEDURE — C1729 CATH, DRAINAGE: HCPCS

## 2025-07-02 PROCEDURE — 7100000011 HC PHASE II RECOVERY - ADDTL 15 MIN

## 2025-07-02 PROCEDURE — 2500000003 HC RX 250 WO HCPCS: Performed by: RADIOLOGY

## 2025-07-02 PROCEDURE — 7100000010 HC PHASE II RECOVERY - FIRST 15 MIN

## 2025-07-02 PROCEDURE — 50435 EXCHANGE NEPHROSTOMY CATH: CPT

## 2025-07-02 PROCEDURE — 6360000002 HC RX W HCPCS: Performed by: RADIOLOGY

## 2025-07-02 PROCEDURE — C1769 GUIDE WIRE: HCPCS

## 2025-07-02 RX ORDER — LIDOCAINE HYDROCHLORIDE 10 MG/ML
INJECTION, SOLUTION INFILTRATION; PERINEURAL PRN
Status: COMPLETED | OUTPATIENT
Start: 2025-07-02 | End: 2025-07-02

## 2025-07-02 RX ORDER — IOPAMIDOL 755 MG/ML
INJECTION, SOLUTION INTRAVASCULAR PRN
Status: COMPLETED | OUTPATIENT
Start: 2025-07-02 | End: 2025-07-02

## 2025-07-02 RX ADMIN — LIDOCAINE HYDROCHLORIDE 5 ML: 10 INJECTION, SOLUTION INFILTRATION; PERINEURAL at 15:20

## 2025-07-02 RX ADMIN — IOPAMIDOL 5 ML: 755 INJECTION, SOLUTION INTRAVENOUS at 15:23

## 2025-07-02 RX ADMIN — CEFAZOLIN SODIUM 1000 MG: 1 POWDER, FOR SOLUTION INTRAMUSCULAR; INTRAVENOUS at 15:17

## 2025-08-11 ENCOUNTER — HOSPITAL ENCOUNTER (OUTPATIENT)
Dept: INTERVENTIONAL RADIOLOGY/VASCULAR | Age: 85
Discharge: HOME | End: 2025-08-11
Attending: STUDENT IN AN ORGANIZED HEALTH CARE EDUCATION/TRAINING PROGRAM

## 2025-08-18 ENCOUNTER — HOSPITAL ENCOUNTER (OUTPATIENT)
Dept: INTERVENTIONAL RADIOLOGY/VASCULAR | Age: 85
Discharge: HOME | End: 2025-08-20
Attending: STUDENT IN AN ORGANIZED HEALTH CARE EDUCATION/TRAINING PROGRAM
Payer: MEDICARE

## 2025-08-18 VITALS
SYSTOLIC BLOOD PRESSURE: 153 MMHG | RESPIRATION RATE: 15 BRPM | DIASTOLIC BLOOD PRESSURE: 74 MMHG | OXYGEN SATURATION: 98 % | HEART RATE: 77 BPM

## 2025-08-18 DIAGNOSIS — T83.098A MALFUNCTION OF NEPHROSTOMY TUBE: ICD-10-CM

## 2025-08-18 DIAGNOSIS — N39.0 URINARY TRACT INFECTION WITHOUT HEMATURIA, SITE UNSPECIFIED: ICD-10-CM

## 2025-08-18 PROCEDURE — 7100000010 HC PHASE II RECOVERY - FIRST 15 MIN

## 2025-08-18 PROCEDURE — 99152 MOD SED SAME PHYS/QHP 5/>YRS: CPT

## 2025-08-18 PROCEDURE — 7100000011 HC PHASE II RECOVERY - ADDTL 15 MIN

## 2025-08-18 PROCEDURE — 2500000003 HC RX 250 WO HCPCS: Performed by: RADIOLOGY

## 2025-08-18 PROCEDURE — C1729 CATH, DRAINAGE: HCPCS

## 2025-08-18 PROCEDURE — 6360000002 HC RX W HCPCS: Performed by: RADIOLOGY

## 2025-08-18 PROCEDURE — 50435 EXCHANGE NEPHROSTOMY CATH: CPT

## 2025-08-18 PROCEDURE — C1769 GUIDE WIRE: HCPCS

## 2025-08-18 RX ORDER — DIPHENHYDRAMINE HYDROCHLORIDE 50 MG/ML
INJECTION, SOLUTION INTRAMUSCULAR; INTRAVENOUS PRN
Status: COMPLETED | OUTPATIENT
Start: 2025-08-18 | End: 2025-08-18

## 2025-08-18 RX ORDER — FENTANYL CITRATE 50 UG/ML
INJECTION, SOLUTION INTRAMUSCULAR; INTRAVENOUS PRN
Status: COMPLETED | OUTPATIENT
Start: 2025-08-18 | End: 2025-08-18

## 2025-08-18 RX ORDER — MIDAZOLAM HYDROCHLORIDE 1 MG/ML
INJECTION, SOLUTION INTRAMUSCULAR; INTRAVENOUS PRN
Status: COMPLETED | OUTPATIENT
Start: 2025-08-18 | End: 2025-08-18

## 2025-08-18 RX ORDER — LIDOCAINE HYDROCHLORIDE 10 MG/ML
INJECTION, SOLUTION EPIDURAL; INFILTRATION; INTRACAUDAL; PERINEURAL PRN
Status: COMPLETED | OUTPATIENT
Start: 2025-08-18 | End: 2025-08-18

## 2025-08-18 RX ADMIN — FENTANYL CITRATE 25 MCG: 50 INJECTION INTRAMUSCULAR; INTRAVENOUS at 12:10

## 2025-08-18 RX ADMIN — CEFAZOLIN SODIUM 1000 MG: 1 POWDER, FOR SOLUTION INTRAMUSCULAR; INTRAVENOUS at 12:12

## 2025-08-18 RX ADMIN — DIPHENHYDRAMINE HYDROCHLORIDE 25 MG: 50 INJECTION INTRAMUSCULAR; INTRAVENOUS at 12:11

## 2025-08-18 RX ADMIN — LIDOCAINE HYDROCHLORIDE 5 ML: 10 INJECTION, SOLUTION EPIDURAL; INFILTRATION; INTRACAUDAL; PERINEURAL at 12:27

## 2025-08-18 RX ADMIN — MIDAZOLAM HYDROCHLORIDE 1 MG: 1 INJECTION, SOLUTION INTRAMUSCULAR; INTRAVENOUS at 12:11

## 2025-08-18 ASSESSMENT — PAIN SCALES - GENERAL
PAINLEVEL_OUTOF10: 0

## (undated) DEVICE — 6 ML SYRINGE,LUER-LOCK TIP: Brand: MONOJECT

## (undated) DEVICE — SOLUTION IRRIG 3000ML 0.9% SOD CHL USP UROMATIC PLAS CONT

## (undated) DEVICE — GENERAL: Brand: MEDLINE INDUSTRIES, INC.

## (undated) DEVICE — GOWN,SIRUS,POLYRNF,BRTHSLV,XL,30/CS: Brand: MEDLINE

## (undated) DEVICE — SUTURE ABSORBABLE MONOFILAMENT 4-0 PS2 18 IN UD PDS + PDP496G

## (undated) DEVICE — SUTURE ETHBND EXCEL SZ 0 L18IN NONABSORBABLE GRN L26MM MO-6 CX45D

## (undated) DEVICE — DRP PK TUR LINGEMAN 77X120

## (undated) DEVICE — SUTURE VCRL SZ 3-0 L27IN ABSRB UD L26MM SH 1/2 CIR J416H

## (undated) DEVICE — GOWN,SIRUS,POLYRNF,BRTHSLV,LG,30/CS: Brand: MEDLINE

## (undated) DEVICE — SUTURE PERMA-HAND SZ 2-0 L30IN NONABSORBABLE BLK L30MM FSL 679H

## (undated) DEVICE — HIGH PRESSURE NEPHROSTOMY BALLOON CATHETER: Brand: NEPHROMAX

## (undated) DEVICE — NEEDLE HYPO 16GA L1.5IN PUR POLYPR HUB S STL REG BVL STR

## (undated) DEVICE — GUIDEWIRE ENDOSCP L150CM DIA0.035IN TIP 3CM PTFE NIT

## (undated) DEVICE — SHEET, T, LAPAROTOMY, STERILE: Brand: MEDLINE

## (undated) DEVICE — MAT SUCT W36XL48IN FLD CTRL DISP

## (undated) DEVICE — TOWEL,STOP FLAG GOLD N-W: Brand: MEDLINE

## (undated) DEVICE — APPLICATOR PREP 26ML 0.7% IOD POVACRYLEX 74% ISO ALC ST

## (undated) DEVICE — 4-PORT MANIFOLD: Brand: NEPTUNE 2

## (undated) DEVICE — GLOVE ORANGE PI 7   MSG9070

## (undated) DEVICE — TUBING IRRIG L77IN DIA0.241IN L BOR FOR CYSTO W/ NVENT

## (undated) DEVICE — PROTECTOR ULN NRV PUR FOAM HK LOOP STRP ANATOMICALLY

## (undated) DEVICE — BLANKET WRM W40.2XL55.9IN IORT LO BODY + MISTRAL AIR

## (undated) DEVICE — APPLIER LIG CLP M L11IN TI STR RNG HNDL FOR 20 CLP DISP

## (undated) DEVICE — KIT PRB L445MM DIA3.4MM GRN W/ STONE CTCH DISP SWISS

## (undated) DEVICE — NITINOL STONE RETRIEVAL BASKET: Brand: ZERO TIP

## (undated) DEVICE — SOLUTION IV 1000ML 0.9% SOD CHL

## (undated) DEVICE — TUBING FLD MGMT Y DBL SPIK DUALWAVE

## (undated) DEVICE — GARMENT,MEDLINE,DVT,INT,CALF,MED, GEN2: Brand: MEDLINE

## (undated) DEVICE — 1LYRTR 16FR10ML100%SIL UMS SNP: Brand: MEDLINE INDUSTRIES, INC.

## (undated) DEVICE — BOWL MED L 32OZ PLAS W/ MOLD GRAD EZ OPN PEEL PCH

## (undated) DEVICE — SPONGE,DRAIN,NONWVN,4"X4",6PLY,STRL,LF: Brand: MEDLINE

## (undated) DEVICE — OPEN-END FLEXI-TIP URETERAL CATHETER: Brand: FLEXI-TIP

## (undated) DEVICE — SYRINGE MED 3ML CLR PLAS STD N CTRL LUERLOCK TIP DISP

## (undated) DEVICE — BAG DRAINAGE 2000ML UROLOGY ANTI REFLUX CHAMBER SAMPLE PORT

## (undated) DEVICE — 3M™ TEGADERM™ TRANSPARENT FILM DRESSING FRAME STYLE, 1628, 6 IN X 8 IN (15 CM X 20 CM), 10/CT 8CT/CASE: Brand: 3M™ TEGADERM™

## (undated) DEVICE — BLADE ES ELASTOMERIC COAT INSUL DURABLE BEND UPTO 90DEG

## (undated) DEVICE — TUBING, SUCTION, 1/4" X 12', STRAIGHT: Brand: MEDLINE

## (undated) DEVICE — CYSTO: Brand: MEDLINE INDUSTRIES, INC.

## (undated) DEVICE — Device

## (undated) DEVICE — CLEANER,CAUTERY TIP,2X2",STERILE: Brand: MEDLINE

## (undated) DEVICE — GLOVE SURG SZ 7 L12IN FNGR THK75MIL WHT LTX POLYMER BEAD